# Patient Record
Sex: MALE | NOT HISPANIC OR LATINO
[De-identification: names, ages, dates, MRNs, and addresses within clinical notes are randomized per-mention and may not be internally consistent; named-entity substitution may affect disease eponyms.]

---

## 2018-08-28 ENCOUNTER — APPOINTMENT (OUTPATIENT)
Dept: ORTHOPEDIC SURGERY | Facility: CLINIC | Age: 58
End: 2018-08-28
Payer: MEDICAID

## 2018-08-28 VITALS
HEART RATE: 94 BPM | HEIGHT: 63 IN | DIASTOLIC BLOOD PRESSURE: 85 MMHG | SYSTOLIC BLOOD PRESSURE: 115 MMHG | BODY MASS INDEX: 19.49 KG/M2 | WEIGHT: 110 LBS

## 2018-08-28 DIAGNOSIS — Z82.61 FAMILY HISTORY OF ARTHRITIS: ICD-10-CM

## 2018-08-28 DIAGNOSIS — Z78.9 OTHER SPECIFIED HEALTH STATUS: ICD-10-CM

## 2018-08-28 DIAGNOSIS — Z80.1 FAMILY HISTORY OF MALIGNANT NEOPLASM OF TRACHEA, BRONCHUS AND LUNG: ICD-10-CM

## 2018-08-28 DIAGNOSIS — Z87.39 PERSONAL HISTORY OF OTHER DISEASES OF THE MUSCULOSKELETAL SYSTEM AND CONNECTIVE TISSUE: ICD-10-CM

## 2018-08-28 PROCEDURE — 20610 DRAIN/INJ JOINT/BURSA W/O US: CPT | Mod: 50

## 2018-08-28 PROCEDURE — 99203 OFFICE O/P NEW LOW 30 MIN: CPT | Mod: 25

## 2018-09-11 ENCOUNTER — OTHER (OUTPATIENT)
Age: 58
End: 2018-09-11

## 2018-09-18 ENCOUNTER — APPOINTMENT (OUTPATIENT)
Dept: ORTHOPEDIC SURGERY | Facility: CLINIC | Age: 58
End: 2018-09-18
Payer: MEDICAID

## 2018-09-18 VITALS
HEIGHT: 63 IN | BODY MASS INDEX: 19.49 KG/M2 | HEART RATE: 86 BPM | DIASTOLIC BLOOD PRESSURE: 86 MMHG | WEIGHT: 110 LBS | SYSTOLIC BLOOD PRESSURE: 126 MMHG

## 2018-09-18 PROCEDURE — 73502 X-RAY EXAM HIP UNI 2-3 VIEWS: CPT | Mod: LT

## 2018-09-18 PROCEDURE — 99214 OFFICE O/P EST MOD 30 MIN: CPT

## 2018-10-05 ENCOUNTER — APPOINTMENT (OUTPATIENT)
Dept: ORTHOPEDIC SURGERY | Facility: CLINIC | Age: 58
End: 2018-10-05
Payer: MEDICAID

## 2018-10-05 VITALS
HEART RATE: 75 BPM | BODY MASS INDEX: 19.49 KG/M2 | HEIGHT: 63 IN | WEIGHT: 110 LBS | DIASTOLIC BLOOD PRESSURE: 73 MMHG | SYSTOLIC BLOOD PRESSURE: 105 MMHG | TEMPERATURE: 97.9 F

## 2018-10-05 PROCEDURE — 72040 X-RAY EXAM NECK SPINE 2-3 VW: CPT

## 2018-10-05 PROCEDURE — 99215 OFFICE O/P EST HI 40 MIN: CPT

## 2018-12-04 ENCOUNTER — OUTPATIENT (OUTPATIENT)
Dept: OUTPATIENT SERVICES | Facility: HOSPITAL | Age: 58
LOS: 1 days | Discharge: HOME | End: 2018-12-04
Payer: MEDICAID

## 2018-12-04 PROCEDURE — 93660 TILT TABLE EVALUATION: CPT | Mod: 26

## 2018-12-11 DIAGNOSIS — R55 SYNCOPE AND COLLAPSE: ICD-10-CM

## 2019-06-04 ENCOUNTER — LABORATORY RESULT (OUTPATIENT)
Age: 59
End: 2019-06-04

## 2019-06-05 ENCOUNTER — MED ADMIN CHARGE (OUTPATIENT)
Age: 59
End: 2019-06-05

## 2019-06-05 ENCOUNTER — APPOINTMENT (OUTPATIENT)
Dept: INFECTIOUS DISEASE | Facility: CLINIC | Age: 59
End: 2019-06-05
Payer: MEDICAID

## 2019-06-05 ENCOUNTER — LABORATORY RESULT (OUTPATIENT)
Age: 59
End: 2019-06-05

## 2019-06-05 ENCOUNTER — OUTPATIENT (OUTPATIENT)
Dept: OUTPATIENT SERVICES | Facility: HOSPITAL | Age: 59
LOS: 1 days | End: 2019-06-05
Payer: MEDICAID

## 2019-06-05 VITALS
HEART RATE: 81 BPM | TEMPERATURE: 96.6 F | SYSTOLIC BLOOD PRESSURE: 103 MMHG | BODY MASS INDEX: 18.07 KG/M2 | DIASTOLIC BLOOD PRESSURE: 79 MMHG | OXYGEN SATURATION: 97 % | WEIGHT: 102 LBS

## 2019-06-05 DIAGNOSIS — Z00.00 ENCOUNTER FOR GENERAL ADULT MEDICAL EXAMINATION W/OUT ABNORMAL FINDINGS: ICD-10-CM

## 2019-06-05 DIAGNOSIS — Z23 ENCOUNTER FOR IMMUNIZATION: ICD-10-CM

## 2019-06-05 DIAGNOSIS — R05 COUGH: ICD-10-CM

## 2019-06-05 DIAGNOSIS — J44.9 CHRONIC OBSTRUCTIVE PULMONARY DISEASE, UNSPECIFIED: ICD-10-CM

## 2019-06-05 DIAGNOSIS — B97.89 OTHER VIRAL AGENTS AS THE CAUSE OF DISEASES CLASSIFIED ELSEWHERE: ICD-10-CM

## 2019-06-05 DIAGNOSIS — A69.20 LYME DISEASE, UNSPECIFIED: ICD-10-CM

## 2019-06-05 LAB
HCT VFR BLD CALC: 48.5 % — SIGNIFICANT CHANGE UP (ref 39–50)
HGB BLD-MCNC: 16.2 G/DL — SIGNIFICANT CHANGE UP (ref 13–17)
MCHC RBC-ENTMCNC: 33.4 GM/DL — SIGNIFICANT CHANGE UP (ref 32–36)
MCHC RBC-ENTMCNC: 33.4 PG — SIGNIFICANT CHANGE UP (ref 27–34)
MCV RBC AUTO: 100 FL — SIGNIFICANT CHANGE UP (ref 80–100)
PLATELET # BLD AUTO: 253 K/UL — SIGNIFICANT CHANGE UP (ref 150–400)
RBC # BLD: 4.85 M/UL — SIGNIFICANT CHANGE UP (ref 4.2–5.8)
RBC # FLD: 12.8 % — SIGNIFICANT CHANGE UP (ref 10.3–14.5)
WBC # BLD: 9.94 K/UL — SIGNIFICANT CHANGE UP (ref 3.8–10.5)
WBC # FLD AUTO: 9.94 K/UL — SIGNIFICANT CHANGE UP (ref 3.8–10.5)

## 2019-06-05 PROCEDURE — 87633 RESP VIRUS 12-25 TARGETS: CPT

## 2019-06-05 PROCEDURE — G0463: CPT | Mod: 25

## 2019-06-05 PROCEDURE — 36415 COLL VENOUS BLD VENIPUNCTURE: CPT

## 2019-06-05 PROCEDURE — 86618 LYME DISEASE ANTIBODY: CPT

## 2019-06-05 PROCEDURE — 86480 TB TEST CELL IMMUN MEASURE: CPT

## 2019-06-05 PROCEDURE — 82746 ASSAY OF FOLIC ACID SERUM: CPT

## 2019-06-05 PROCEDURE — 82607 VITAMIN B-12: CPT

## 2019-06-05 PROCEDURE — 87389 HIV-1 AG W/HIV-1&-2 AB AG IA: CPT

## 2019-06-05 PROCEDURE — 87040 BLOOD CULTURE FOR BACTERIA: CPT

## 2019-06-05 PROCEDURE — 90670 PCV13 VACCINE IM: CPT

## 2019-06-05 PROCEDURE — 85027 COMPLETE CBC AUTOMATED: CPT

## 2019-06-05 PROCEDURE — 87581 M.PNEUMON DNA AMP PROBE: CPT

## 2019-06-05 PROCEDURE — 87798 DETECT AGENT NOS DNA AMP: CPT

## 2019-06-05 PROCEDURE — 87486 CHLMYD PNEUM DNA AMP PROBE: CPT

## 2019-06-05 PROCEDURE — G0009: CPT

## 2019-06-05 PROCEDURE — 99204 OFFICE O/P NEW MOD 45 MIN: CPT

## 2019-06-06 LAB
B BURGDOR C6 AB SER-ACNC: NEGATIVE — SIGNIFICANT CHANGE UP
B BURGDOR IGG+IGM SER-ACNC: 0.02 INDEX — SIGNIFICANT CHANGE UP (ref 0.01–0.89)
FOLATE SERPL-MCNC: 11.9 NG/ML — SIGNIFICANT CHANGE UP
HIV 1+2 AB+HIV1 P24 AG SERPL QL IA: SIGNIFICANT CHANGE UP
RAPID RVP RESULT: SIGNIFICANT CHANGE UP
VIT B12 SERPL-MCNC: 592 PG/ML — SIGNIFICANT CHANGE UP (ref 232–1245)

## 2019-06-06 RX ORDER — DOXYCYCLINE HYCLATE 100 MG/1
100 CAPSULE ORAL
Qty: 28 | Refills: 0 | Status: DISCONTINUED | COMMUNITY
Start: 2019-06-05 | End: 2019-06-06

## 2019-06-06 NOTE — ASSESSMENT
[Treatment Adherence] : treatment adherence [Smoking Cessation] : smoking cessation [Rx Dose / Side Effects] : Rx dose/side effects [Drug Interactions / Side Effects] : drug interactions/side effects [FreeTextEntry1] : 59M with COPD, chronic pain, multiple ligament tears, current smoker here for positive lyme titers. \par \par The clinical manifestations of Lyme disease can generally be divided into three phases: early localized, early disseminated, and late disease. He does not have either of the signs, or symptoms of the first two. \par Lyme arthritis is characterized by intermittent or persistent arthritis in a few large joints, especially the knee which the patient does not have. In fact he reports pain more in his muscles then joints and denies swollen joints. \par There is a lot of controversy about post-lyme disease syndrome aka chronic lyme disease. \par \par IDSA has proposed a definition of post-Lyme disease syndrome. Criteria for this syndrome include a prior history of Lyme disease treated with an accepted regimen and resolution or stabilization of the objective manifestations of Lyme disease. In addition, the onset of subjective symptoms (eg, fatigue, widespread musculoskeletal pain, complaints of cognitive difficulties) must have occurred within six months of the diagnosis of Lyme disease and persist (continuously or relapsing) for at least six months after completion of antimicrobial therapy. \par \par Mr. Garcia denies being diagnosed and treated for lyme. Since the patient has not been treated and has a positive IgM, would favor a short course of doxycycline. Additionally, he has lost weight, is cachectic, and has COPD and still smokes. Malignancy workup should be done. \par He also has leukocytosis. HIV, STD testing, blood cultures may help distinguish a diagnosis. \par \par Recommendations: \par #Joint Pains/Lyme \par -will treate with Doxy 100mg BID x14 days \par -will send Lyme C6 and Lyme PCR \par -will send Tick Borne panel to ensure no co-infection \par -ESR/CRP/B12/Folate/TSH\par \par #Leukocytosis\par -Send HIV, syphilis, Hep C, QuantiFeron \par -2 sets of blood cultures \par \par #COPD\par -PCP or Pulmonologist should refer for CT chest for screening for Lung Ca\par -Smoking Cessation counselling done in office \par  [Treatment Education] : treatment education [Anticipatory Guidance] : anticipatory guidance
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
## 2019-06-06 NOTE — END OF VISIT
[] : Fellow [FreeTextEntry3] : Patient seen and examined with Dr. butterfield\par I agree with his history exam and plans as noted above.  HAs a + IgM for Lyme disease but nospecific reported exposure history  Will treat with a short course of doxycycoine. His chronic symptom complaints would not be related to Lyme disease based on serology studies and are most likely all tied to chronic smoking history and known COPD. He reports recent chest CT scan that was negative for a malignancy\par Will r/o other infecitious causes of his fatigue SBE etc.

## 2019-06-06 NOTE — PHYSICAL EXAM
[General Appearance - Alert] : alert [Sclera] : the sclera and conjunctiva were normal [Extraocular Movements] : extraocular movements were intact [Neck Appearance] : the appearance of the neck was normal [Neck Cervical Mass (___cm)] : no neck mass was observed [Jugular Venous Distention Increased] : there was no jugular-venous distention [Thyroid Diffuse Enlargement] : the thyroid was not enlarged [Auscultation Breath Sounds / Voice Sounds] : lungs were clear to auscultation bilaterally [Heart Sounds] : normal S1 and S2 [Murmurs] : no murmurs [Bowel Sounds] : normal bowel sounds [Abdomen Soft] : soft [Abdomen Tenderness] : non-tender [Abdomen Mass (___ Cm)] : no abdominal mass palpated [No Palpable Adenopathy] : no palpable adenopathy [Costovertebral Angle Tenderness] : no CVA tenderness [] : no rash [Skin Color & Pigmentation] : normal skin color and pigmentation [Oriented To Time, Place, And Person] : oriented to person, place, and time [FreeTextEntry1] : + pink puffer, decreased breath sounds b/l

## 2019-06-06 NOTE — REVIEW OF SYSTEMS
[Difficulty Sleeping] : difficulty sleeping [Body Aches] : body aches [Feeling Tired] : feeling tired [Normal Appetite] : normal appetite [Wheezing] : wheezing [Cough] : cough [SOB on Exertion] : shortness of breath during exertion [Sputum] : coughing up ~M sputum [Joint Stiffness] : joint stiffness [Joint Pain] : joint pain [Limb Pain] : limb pain [Negative] : Neurological [Fever] : no fever [Chills] : no chills [Abdominal Pain] : no abdominal pain [Vomiting] : no vomiting

## 2019-06-11 LAB
CULTURE RESULTS: SIGNIFICANT CHANGE UP
CULTURE RESULTS: SIGNIFICANT CHANGE UP
SPECIMEN SOURCE: SIGNIFICANT CHANGE UP
SPECIMEN SOURCE: SIGNIFICANT CHANGE UP

## 2019-06-17 ENCOUNTER — LABORATORY RESULT (OUTPATIENT)
Age: 59
End: 2019-06-17

## 2019-06-17 ENCOUNTER — APPOINTMENT (OUTPATIENT)
Dept: INFECTIOUS DISEASE | Facility: CLINIC | Age: 59
End: 2019-06-17

## 2019-06-17 ENCOUNTER — OUTPATIENT (OUTPATIENT)
Dept: OUTPATIENT SERVICES | Facility: HOSPITAL | Age: 59
LOS: 1 days | End: 2019-06-17
Payer: MEDICAID

## 2019-06-17 DIAGNOSIS — B97.89 OTHER VIRAL AGENTS AS THE CAUSE OF DISEASES CLASSIFIED ELSEWHERE: ICD-10-CM

## 2019-06-17 LAB
24R-OH-CALCIDIOL SERPL-MCNC: 55.6 NG/ML — SIGNIFICANT CHANGE UP (ref 30–80)
ALBUMIN SERPL ELPH-MCNC: 4.7 G/DL — SIGNIFICANT CHANGE UP (ref 3.3–5)
ALP SERPL-CCNC: 56 U/L — SIGNIFICANT CHANGE UP (ref 40–120)
ALT FLD-CCNC: 12 U/L — SIGNIFICANT CHANGE UP (ref 10–45)
ANION GAP SERPL CALC-SCNC: 12 MMOL/L — SIGNIFICANT CHANGE UP (ref 5–17)
AST SERPL-CCNC: 22 U/L — SIGNIFICANT CHANGE UP (ref 10–40)
BILIRUB SERPL-MCNC: 0.4 MG/DL — SIGNIFICANT CHANGE UP (ref 0.2–1.2)
BUN SERPL-MCNC: 13 MG/DL — SIGNIFICANT CHANGE UP (ref 7–23)
CALCIUM SERPL-MCNC: 9.9 MG/DL — SIGNIFICANT CHANGE UP (ref 8.4–10.5)
CHLORIDE SERPL-SCNC: 103 MMOL/L — SIGNIFICANT CHANGE UP (ref 96–108)
CO2 SERPL-SCNC: 26 MMOL/L — SIGNIFICANT CHANGE UP (ref 22–31)
CREAT SERPL-MCNC: 1.01 MG/DL — SIGNIFICANT CHANGE UP (ref 0.5–1.3)
CRP SERPL-MCNC: 0.17 MG/DL — SIGNIFICANT CHANGE UP (ref 0–0.4)
ERYTHROCYTE [SEDIMENTATION RATE] IN BLOOD: 5 MM/HR — SIGNIFICANT CHANGE UP (ref 0–20)
GLUCOSE SERPL-MCNC: 94 MG/DL — SIGNIFICANT CHANGE UP (ref 70–99)
HCV AB S/CO SERPL IA: 0.09 S/CO — SIGNIFICANT CHANGE UP (ref 0–0.99)
HCV AB SERPL-IMP: SIGNIFICANT CHANGE UP
POTASSIUM SERPL-MCNC: 4.4 MMOL/L — SIGNIFICANT CHANGE UP (ref 3.5–5.3)
POTASSIUM SERPL-SCNC: 4.4 MMOL/L — SIGNIFICANT CHANGE UP (ref 3.5–5.3)
PROT SERPL-MCNC: 6.6 G/DL — SIGNIFICANT CHANGE UP (ref 6–8.3)
SODIUM SERPL-SCNC: 140 MMOL/L — SIGNIFICANT CHANGE UP (ref 135–145)
T PALLIDUM AB TITR SER: NEGATIVE — SIGNIFICANT CHANGE UP
T4 FREE+ TSH PNL SERPL: 1.46 UIU/ML — SIGNIFICANT CHANGE UP (ref 0.27–4.2)

## 2019-06-17 PROCEDURE — 86753 PROTOZOA ANTIBODY NOS: CPT

## 2019-06-17 PROCEDURE — 86780 TREPONEMA PALLIDUM: CPT

## 2019-06-17 PROCEDURE — 86140 C-REACTIVE PROTEIN: CPT

## 2019-06-17 PROCEDURE — 84443 ASSAY THYROID STIM HORMONE: CPT

## 2019-06-17 PROCEDURE — 85652 RBC SED RATE AUTOMATED: CPT

## 2019-06-17 PROCEDURE — 87476 LYME DIS DNA AMP PROBE: CPT

## 2019-06-17 PROCEDURE — 87522 HEPATITIS C REVRS TRNSCRPJ: CPT

## 2019-06-17 PROCEDURE — 86666 EHRLICHIA ANTIBODY: CPT

## 2019-06-17 PROCEDURE — 86803 HEPATITIS C AB TEST: CPT

## 2019-06-17 PROCEDURE — 80053 COMPREHEN METABOLIC PANEL: CPT

## 2019-06-17 PROCEDURE — 86618 LYME DISEASE ANTIBODY: CPT

## 2019-06-17 PROCEDURE — 82306 VITAMIN D 25 HYDROXY: CPT

## 2019-06-19 LAB
A PHAGOCYTOPH IGG TITR SER IF: SIGNIFICANT CHANGE UP TITER
B BURGDOR AB SER QL IA: NEGATIVE — SIGNIFICANT CHANGE UP
B MICROTI IGG TITR SER: SIGNIFICANT CHANGE UP TITER
E CHAFFEENSIS IGG TITR SER IF: SIGNIFICANT CHANGE UP TITER
HCV RNA SERPL NAA DL=5-ACNC: SIGNIFICANT CHANGE UP IU/ML
HCV RNA SPEC NAA+PROBE-LOG IU: SIGNIFICANT CHANGE UP LOGIU/ML

## 2019-06-21 LAB — B BURGDOR DNA SPEC QL NAA+PROBE: NEGATIVE — SIGNIFICANT CHANGE UP

## 2019-07-10 ENCOUNTER — APPOINTMENT (OUTPATIENT)
Dept: INFECTIOUS DISEASE | Facility: CLINIC | Age: 59
End: 2019-07-10
Payer: MEDICAID

## 2019-07-10 ENCOUNTER — OUTPATIENT (OUTPATIENT)
Dept: OUTPATIENT SERVICES | Facility: HOSPITAL | Age: 59
LOS: 1 days | End: 2019-07-10
Payer: MEDICAID

## 2019-07-10 VITALS
HEART RATE: 78 BPM | SYSTOLIC BLOOD PRESSURE: 94 MMHG | BODY MASS INDEX: 18.43 KG/M2 | OXYGEN SATURATION: 97 % | TEMPERATURE: 97 F | HEIGHT: 63 IN | WEIGHT: 104 LBS | DIASTOLIC BLOOD PRESSURE: 69 MMHG

## 2019-07-10 DIAGNOSIS — B97.89 OTHER VIRAL AGENTS AS THE CAUSE OF DISEASES CLASSIFIED ELSEWHERE: ICD-10-CM

## 2019-07-10 PROCEDURE — G0463: CPT

## 2019-07-10 PROCEDURE — 99214 OFFICE O/P EST MOD 30 MIN: CPT

## 2019-07-10 RX ORDER — NAPROXEN 500 MG/1
500 TABLET ORAL
Qty: 30 | Refills: 0 | Status: ACTIVE | COMMUNITY
Start: 2019-07-10 | End: 1900-01-01

## 2019-07-10 NOTE — REVIEW OF SYSTEMS
[Difficulty Sleeping] : difficulty sleeping [Body Aches] : body aches [Feeling Tired] : feeling tired [Joint Pain] : joint pain [Shortness Of Breath] : shortness of breath [Limb Pain] : limb pain [Dizziness] : dizziness [Negative] : Psychiatric

## 2019-07-11 NOTE — END OF VISIT
[FreeTextEntry3] : Patient seen and examined with Dr Ferguson\par I agree with his interval history exam and plans as above\par Discussed at length with patient that no  further abx treatment is needed. No evidence of active tick born illness Most of his complainats can be tied to his ongoing cigarette use and offered smoking cessation which he declined

## 2019-07-11 NOTE — PHYSICAL EXAM
[General Appearance - Alert] : alert [Extraocular Movements] : extraocular movements were intact [Sclera] : the sclera and conjunctiva were normal [Neck Appearance] : the appearance of the neck was normal [Neck Cervical Mass (___cm)] : no neck mass was observed [Jugular Venous Distention Increased] : there was no jugular-venous distention [Thyroid Diffuse Enlargement] : the thyroid was not enlarged [Auscultation Breath Sounds / Voice Sounds] : lungs were clear to auscultation bilaterally [Heart Sounds] : normal S1 and S2 [Murmurs] : no murmurs [Abdomen Soft] : soft [Bowel Sounds] : normal bowel sounds [Abdomen Tenderness] : non-tender [Abdomen Mass (___ Cm)] : no abdominal mass palpated [Costovertebral Angle Tenderness] : no CVA tenderness [No Palpable Adenopathy] : no palpable adenopathy [Skin Color & Pigmentation] : normal skin color and pigmentation [] : no rash [Oriented To Time, Place, And Person] : oriented to person, place, and time [FreeTextEntry1] : + pink puffer, decreased breath sounds b/l

## 2019-07-11 NOTE — ASSESSMENT
[FreeTextEntry1] : 59M with COPD, chronic pain, multiple ligament tears, current smoker here for positive lyme titers. \par \par The clinical manifestations of Lyme disease can generally be divided into three phases: early localized, early disseminated, and late disease. He does not have either of the signs, or symptoms of the first two. \par Lyme arthritis is characterized by intermittent or persistent arthritis in a few large joints, especially the knee which the patient does not have. In fact he reports pain more in his muscles then joints and denies swollen joints. \par There is a lot of controversy about post-lyme disease syndrome aka chronic lyme disease. \par \par IDSA has proposed a definition of post-Lyme disease syndrome. Criteria for this syndrome include a prior history of Lyme disease treated with an accepted regimen and resolution or stabilization of the objective manifestations of Lyme disease. In addition, the onset of subjective symptoms (eg, fatigue, widespread musculoskeletal pain, complaints of cognitive difficulties) must have occurred within six months of the diagnosis of Lyme disease and persist (continuously or relapsing) for at least six months after completion of antimicrobial therapy. \par \par Mr. Garcia denies being diagnosed and treated for lyme. Since the patient had not been treated and had a positive IgM, we treated with 14 days of doxycycline. \par Additionally, he has lost weight, is cachectic, and has COPD and still smokes. \par Malignancy workup should be done. \par The workup for other infectious causes were negative. \par \par Recommendations: \par \par #Joint Pains/Lyme \par -s/p treatment with Doxy 100mg BID x14 days \par - Lyme C6 and Lyme PCR both negative\par -Tick Borne panel negative \par -ESR/CRP/B12/Folate/TSH all normal \par \par #Headaches/Neuropathy\par -He should be closely followed by a Neurologist that he trusts \par -B12 and folate are normal \par -smoking probably exacerbating the problem \par -will obtain CT head without contrast to rule out bleeds, hydrocephalus and large masses \par -Neurology referral given \par \par #Chronic Pain Syndrome\par -unclear etiology of patients pain\par -will refer to PM&R \par -Naproxen PRN for pain \par -Ortho follow up \par -If patient wants, we can refer him to pain management \par \par #Leukocytosis\par -HIV, syphilis, Hep C, QuantiFeron negative \par -2 sets of blood cultures negative\par -Leukocytosis resolved \par \par #COPD\par -PCP or Pulmonologist should refer for CT chest for screening for Lung Ca\par -Pulmonologist has patient on Prednisone 5 mg daily \par -Smoking Cessation counselling done in office again and explained that many of his symptoms may be attributed to his long term smoking and he should quit. He said he is motivated but that smoking is like 2nd nature at this point in his life.  [Treatment Education] : treatment education [Treatment Adherence] : treatment adherence [Rx Dose / Side Effects] : Rx dose/side effects [Risk Reduction] : risk reduction [Nutritional / Food Issues] : nutritional/food issues [Universal Precautions] : universal precautions [Smoking Cessation] : smoking cessation [Medical Care Issues] : medical care issues
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
## 2019-07-15 DIAGNOSIS — G43.909 MIGRAINE, UNSPECIFIED, NOT INTRACTABLE, WITHOUT STATUS MIGRAINOSUS: ICD-10-CM

## 2019-07-15 DIAGNOSIS — G89.4 CHRONIC PAIN SYNDROME: ICD-10-CM

## 2019-07-22 ENCOUNTER — OUTPATIENT (OUTPATIENT)
Dept: OUTPATIENT SERVICES | Facility: HOSPITAL | Age: 59
LOS: 1 days | End: 2019-07-22
Payer: MEDICAID

## 2019-07-22 ENCOUNTER — APPOINTMENT (OUTPATIENT)
Dept: CT IMAGING | Facility: CLINIC | Age: 59
End: 2019-07-22
Payer: MEDICAID

## 2019-07-22 DIAGNOSIS — Z00.8 ENCOUNTER FOR OTHER GENERAL EXAMINATION: ICD-10-CM

## 2019-07-22 PROCEDURE — 70450 CT HEAD/BRAIN W/O DYE: CPT

## 2019-07-22 PROCEDURE — 70450 CT HEAD/BRAIN W/O DYE: CPT | Mod: 26

## 2019-11-13 ENCOUNTER — MED ADMIN CHARGE (OUTPATIENT)
Age: 59
End: 2019-11-13

## 2019-11-13 ENCOUNTER — OUTPATIENT (OUTPATIENT)
Dept: OUTPATIENT SERVICES | Facility: HOSPITAL | Age: 59
LOS: 1 days | End: 2019-11-13
Payer: MEDICAID

## 2019-11-13 ENCOUNTER — APPOINTMENT (OUTPATIENT)
Dept: INFECTIOUS DISEASE | Facility: CLINIC | Age: 59
End: 2019-11-13
Payer: MEDICAID

## 2019-11-13 VITALS
HEIGHT: 63 IN | DIASTOLIC BLOOD PRESSURE: 73 MMHG | HEART RATE: 82 BPM | BODY MASS INDEX: 17.36 KG/M2 | SYSTOLIC BLOOD PRESSURE: 103 MMHG | WEIGHT: 98 LBS | OXYGEN SATURATION: 97 %

## 2019-11-13 DIAGNOSIS — B97.89 OTHER VIRAL AGENTS AS THE CAUSE OF DISEASES CLASSIFIED ELSEWHERE: ICD-10-CM

## 2019-11-13 PROCEDURE — 90732 PPSV23 VACC 2 YRS+ SUBQ/IM: CPT

## 2019-11-13 PROCEDURE — G0463: CPT | Mod: 25

## 2019-11-13 PROCEDURE — 99214 OFFICE O/P EST MOD 30 MIN: CPT

## 2019-11-13 PROCEDURE — G0009: CPT

## 2019-11-13 NOTE — PHYSICAL EXAM
[General Appearance - Alert] : alert [Extraocular Movements] : extraocular movements were intact [Sclera] : the sclera and conjunctiva were normal [Neck Appearance] : the appearance of the neck was normal [Neck Cervical Mass (___cm)] : no neck mass was observed [Jugular Venous Distention Increased] : there was no jugular-venous distention [Thyroid Diffuse Enlargement] : the thyroid was not enlarged [Auscultation Breath Sounds / Voice Sounds] : lungs were clear to auscultation bilaterally [Heart Sounds] : normal S1 and S2 [Murmurs] : no murmurs [Bowel Sounds] : normal bowel sounds [Abdomen Soft] : soft [Abdomen Tenderness] : non-tender [Abdomen Mass (___ Cm)] : no abdominal mass palpated [Costovertebral Angle Tenderness] : no CVA tenderness [Skin Color & Pigmentation] : normal skin color and pigmentation [] : no rash [No Palpable Adenopathy] : no palpable adenopathy [Oriented To Time, Place, And Person] : oriented to person, place, and time [FreeTextEntry1] : + pink puffer, decreased breath sounds b/l
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
## 2019-11-13 NOTE — END OF VISIT
[] : Fellow [FreeTextEntry3] : Patient seen and examined with Dr. Ferguson\par I agree with his interval history exam and plans asnoted above\par \par Patient does not have acute nor chronic Lyme disease. He is a heavy smoker and needs to follow up with pulomonary. I think he needs a chest CT scan to r/o underlying malignancy  and patient is aware. His insurance is requiring a CXR prior to CT scan which we will order today. He is seeing Pulmonary this week and will ask for a chest CT scan at his appointment. Pulmonologist is welcome to contact me for further  discussion at [phone]

## 2019-11-13 NOTE — ASSESSMENT
[FreeTextEntry1] : 59M with COPD, chronic pain, multiple ligament tears, current smoker here for positive lyme titers and pain. \par \par The clinical manifestations of Lyme disease can generally be divided into three phases: early localized, early disseminated, and late disease. He does not have either of the signs, or symptoms of the first two. \par Lyme arthritis is characterized by intermittent or persistent arthritis in a few large joints, especially the knee which the patient does not have. In fact he reports pain more in his muscles then joints and denies swollen joints. \par There is a lot of controversy about post-lyme disease syndrome aka chronic lyme disease. \par \par IDSA has proposed a definition of post-Lyme disease syndrome. Criteria for this syndrome include a prior history of Lyme disease treated with an accepted regimen and resolution or stabilization of the objective manifestations of Lyme disease. In addition, the onset of subjective symptoms (eg, fatigue, widespread musculoskeletal pain, complaints of cognitive difficulties) must have occurred within six months of the diagnosis of Lyme disease and persist (continuously or relapsing) for at least six months after completion of antimicrobial therapy. \par \par Mr. Garcia was given a 14 day course of doxy without any resolution of symptoms. Additionally, he has lost weight, is cachectic, and has COPD and still smokes. Malignancy workup should be done. \par We have thus ruled out vitamin B12, folate and vit D deficiency, HIV, hep C, lyme, tick born illnesses.  \par \par \par Recommendations: \par #Joint Pains/Lyme \par -s/p treatment with 14days of doxy\par - Lyme C6 negative and Lyme PCR negative\par -Tick borne panel negative\par -ESR/CRP/B12/Folate/TSH all normal\par \par #COPD\par -Given patient has chronic cough and ROBIN, weight loss will highly recommend CT chest without contrast \par -Will get CXR initially then likely CT chest\par -Smoking Cessation counselling done in office \par -last visit dany Scott and today will give Pneumovax

## 2019-11-13 NOTE — REVIEW OF SYSTEMS
[Body Aches] : body aches [Feeling Sick] : feeling sick [Feeling Tired] : feeling tired [Hoarseness] : hoarseness [Shortness Of Breath] : shortness of breath [Cough] : cough [SOB on Exertion] : shortness of breath during exertion [Joint Stiffness] : joint stiffness [Joint Pain] : joint pain [Limb Pain] : limb pain [Dizziness] : dizziness [Depression] : depression [Negative] : Integumentary [Fever] : no fever [Chills] : no chills [Sore Throat] : no sore throat [Confused] : no confusion [Convulsions] : no convulsions [Suicidal] : not suicidal

## 2019-11-21 DIAGNOSIS — S76.312A STRAIN OF MUSCLE, FASCIA AND TENDON OF THE POSTERIOR MUSCLE GROUP AT THIGH LEVEL, LEFT THIGH, INITIAL ENCOUNTER: ICD-10-CM

## 2019-11-21 DIAGNOSIS — M75.112 INCOMPLETE ROTATOR CUFF TEAR OR RUPTURE OF LEFT SHOULDER, NOT SPECIFIED AS TRAUMATIC: ICD-10-CM

## 2019-11-21 DIAGNOSIS — G89.4 CHRONIC PAIN SYNDROME: ICD-10-CM

## 2019-11-21 DIAGNOSIS — J47.9 BRONCHIECTASIS, UNCOMPLICATED: ICD-10-CM

## 2019-11-21 DIAGNOSIS — Z23 ENCOUNTER FOR IMMUNIZATION: ICD-10-CM

## 2019-12-16 ENCOUNTER — APPOINTMENT (OUTPATIENT)
Dept: RADIOLOGY | Facility: CLINIC | Age: 59
End: 2019-12-16
Payer: MEDICAID

## 2019-12-16 ENCOUNTER — OUTPATIENT (OUTPATIENT)
Dept: OUTPATIENT SERVICES | Facility: HOSPITAL | Age: 59
LOS: 1 days | End: 2019-12-16
Payer: MEDICAID

## 2019-12-16 DIAGNOSIS — Z00.8 ENCOUNTER FOR OTHER GENERAL EXAMINATION: ICD-10-CM

## 2019-12-16 PROCEDURE — 71046 X-RAY EXAM CHEST 2 VIEWS: CPT

## 2019-12-16 PROCEDURE — 71046 X-RAY EXAM CHEST 2 VIEWS: CPT | Mod: 26

## 2020-02-12 ENCOUNTER — LABORATORY RESULT (OUTPATIENT)
Age: 60
End: 2020-02-12

## 2020-02-12 ENCOUNTER — OUTPATIENT (OUTPATIENT)
Dept: OUTPATIENT SERVICES | Facility: HOSPITAL | Age: 60
LOS: 1 days | End: 2020-02-12
Payer: MEDICAID

## 2020-02-12 ENCOUNTER — APPOINTMENT (OUTPATIENT)
Dept: INFECTIOUS DISEASE | Facility: CLINIC | Age: 60
End: 2020-02-12
Payer: MEDICAID

## 2020-02-12 VITALS
SYSTOLIC BLOOD PRESSURE: 96 MMHG | OXYGEN SATURATION: 96 % | DIASTOLIC BLOOD PRESSURE: 69 MMHG | RESPIRATION RATE: 16 BRPM | HEART RATE: 74 BPM | HEIGHT: 63 IN | TEMPERATURE: 97 F

## 2020-02-12 DIAGNOSIS — B97.89 OTHER VIRAL AGENTS AS THE CAUSE OF DISEASES CLASSIFIED ELSEWHERE: ICD-10-CM

## 2020-02-12 PROCEDURE — 87581 M.PNEUMON DNA AMP PROBE: CPT

## 2020-02-12 PROCEDURE — 87633 RESP VIRUS 12-25 TARGETS: CPT

## 2020-02-12 PROCEDURE — 87486 CHLMYD PNEUM DNA AMP PROBE: CPT

## 2020-02-12 PROCEDURE — G0463: CPT

## 2020-02-12 PROCEDURE — 87798 DETECT AGENT NOS DNA AMP: CPT

## 2020-02-12 PROCEDURE — 99214 OFFICE O/P EST MOD 30 MIN: CPT

## 2020-02-12 NOTE — PHYSICAL EXAM
[General Appearance - Alert] : alert [Sclera] : the sclera and conjunctiva were normal [Extraocular Movements] : extraocular movements were intact [Neck Appearance] : the appearance of the neck was normal [Neck Cervical Mass (___cm)] : no neck mass was observed [Jugular Venous Distention Increased] : there was no jugular-venous distention [Thyroid Diffuse Enlargement] : the thyroid was not enlarged [Auscultation Breath Sounds / Voice Sounds] : lungs were clear to auscultation bilaterally [Heart Sounds] : normal S1 and S2 [Murmurs] : no murmurs [Bowel Sounds] : normal bowel sounds [Abdomen Soft] : soft [Abdomen Tenderness] : non-tender [Abdomen Mass (___ Cm)] : no abdominal mass palpated [Costovertebral Angle Tenderness] : no CVA tenderness [No Palpable Adenopathy] : no palpable adenopathy [Skin Color & Pigmentation] : normal skin color and pigmentation [] : no rash [No Focal Deficits] : no focal deficits [Oriented To Time, Place, And Person] : oriented to person, place, and time [FreeTextEntry1] : teeth are absent, lower gums appears to have had surgery

## 2020-02-12 NOTE — REVIEW OF SYSTEMS
[Body Aches] : body aches [Difficulty Sleeping] : difficulty sleeping [Feeling Tired] : feeling tired [Shortness Of Breath] : shortness of breath [Wheezing] : wheezing [Cough] : cough [SOB on Exertion] : shortness of breath during exertion [Joint Pain] : joint pain [Negative] : Heme/Lymph [Confused] : no confusion [Fever] : no fever [Convulsions] : no convulsions [de-identified] : +migraines
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
## 2020-02-12 NOTE — ASSESSMENT
[FreeTextEntry1] : 59M with COPD, chronic pain, multiple ligament tears, current smoker here for positive lyme titers and pain. \par We have addressed his Lyme concern (see previous ID notes) and no longer think he has lyme or needs further antibiotic therapy. There is concern given his severe emphysema that there could be underlying malignancy or worsening COPD. Additionally he has an acute event today and will give nebulizer therapy. He is saturating 96% on room air. \par \par Pt has lost weight, is cachectic, and has COPD and still smokes. Malignancy workup should be done. Which he reports that his PMD has started as well. CT chest done at [facility] 12/16/19 report is being sent to our clinic. In the mean time will refer patient to [facility] pulmonologist in [place] as his former pulm  is dropping his insurance. \par He has been ruled out for issues pertaining to vitamin B12, folate and vit D deficiency, HIV, hep C, lyme, tick born illnesses. \par \par Recommendations: \par #Joint Pains/Lyme \par -s/p treatment with 14 days of doxy\par - Lyme C6 negative and Lyme PCR negative\par -Tick borne panel negative\par -ESR/CRP/B12/Folate/TSH all normal\par \par #COPD/Cough\par -Given patient has chronic cough and ROBIN, weight loss concerned of primary lung pathology \par -CT chest without contrast done 12/16/19- awaiting results \par -repeat CXR today given worsening cough \par -RVP done in office \par -Smoking Cessation counselling done in office \par -Prevnar 6/5/19\par -Pneumovax 11/13/19\par -Pulmonary consult for new pulmonologist \par \par #Migraines\par CT head negative (report to be scanned)\par referral given for new neurologist but he hasn’t seen one yet \par advised to make an appointment

## 2020-02-12 NOTE — END OF VISIT
[FreeTextEntry3] : Patient seen and examined with Dr. Ferguson\par I agree with his interval history exam and plans as noted above\par Patient continues to smoke cigarettes RTC today complaining of cough and wheezing diffusely on exam. no fevers or chills  will check rVP, needs albuterol nebulizer treatment\par Will need CXR needs to sing release so we can obtain CT scan results\par Requesting a referral for a new pulmonary physician through [facility] also needs a PCP\par \par  [] : Fellow

## 2020-02-13 DIAGNOSIS — R05 COUGH: ICD-10-CM

## 2020-02-13 DIAGNOSIS — J44.9 CHRONIC OBSTRUCTIVE PULMONARY DISEASE, UNSPECIFIED: ICD-10-CM

## 2020-02-13 LAB — RAPID RVP RESULT: SIGNIFICANT CHANGE UP

## 2020-03-25 ENCOUNTER — APPOINTMENT (OUTPATIENT)
Dept: INFECTIOUS DISEASE | Facility: CLINIC | Age: 60
End: 2020-03-25
Payer: MEDICAID

## 2020-03-25 ENCOUNTER — OUTPATIENT (OUTPATIENT)
Dept: OUTPATIENT SERVICES | Facility: HOSPITAL | Age: 60
LOS: 1 days | End: 2020-03-25
Payer: MEDICAID

## 2020-03-25 DIAGNOSIS — J43.9 EMPHYSEMA, UNSPECIFIED: ICD-10-CM

## 2020-03-25 DIAGNOSIS — G89.29 OTHER CHRONIC PAIN: ICD-10-CM

## 2020-03-25 DIAGNOSIS — B97.89 OTHER VIRAL AGENTS AS THE CAUSE OF DISEASES CLASSIFIED ELSEWHERE: ICD-10-CM

## 2020-03-25 DIAGNOSIS — G43.709 CHRONIC MIGRAINE WITHOUT AURA, NOT INTRACTABLE, WITHOUT STATUS MIGRAINOSUS: ICD-10-CM

## 2020-03-25 PROCEDURE — ZZZZZ: CPT

## 2020-03-25 PROCEDURE — G0463: CPT

## 2020-04-23 ENCOUNTER — APPOINTMENT (OUTPATIENT)
Dept: PHYSICAL MEDICINE AND REHAB | Facility: CLINIC | Age: 60
End: 2020-04-23
Payer: MEDICAID

## 2020-04-23 PROCEDURE — 99203 OFFICE O/P NEW LOW 30 MIN: CPT

## 2020-04-23 RX ORDER — DOXYCYCLINE 100 MG/1
100 TABLET, FILM COATED ORAL TWICE DAILY
Qty: 28 | Refills: 0 | Status: DISCONTINUED | COMMUNITY
Start: 2019-06-06 | End: 2020-04-23

## 2020-04-23 NOTE — DATA REVIEWED
[MRI] : MRI [FreeTextEntry1] : MRI R Shoulder (April 2019): SS tendinosis w/ low-grade tearing; LHBT tendinosis; tear of inferior glenoid labrum w/ associated small paralabral cyst.\par \par MRI L Shoulder (April 2019): SS tendinosis w/ mild fraying; subscapularis tendinosis; RC tendinopathy somewhat progressed since Jan 2018; DJD GH & AC joints; LHBT tendinosis and low-grade tearing; small non-displaced superior labral tear (new since Jan 2018).\par \par MRI C-spine (Oct. 2018): multilevel DDD w/ moderate narrowing spinal canal at C5-6 and mild narrowing at C6-7; varying degrees of NF stenosis, worse at C5-6.

## 2020-04-23 NOTE — ASSESSMENT
[FreeTextEntry1] : 60 y.o. M w/ h/o COPD, Graves disease and chronic Lyme disease/syndrome presents w/ multiple, chronic MSK pain complaints.  Cervical DDD/spondylosis and b/l RC tendinopathy will be addressed first in P.T. followed by his LE MSK complaints.  I cautioned patient regarding chronic use of PO NSAIDs 2' GI/cardiac/renal toxicities.  No indication for CSI given chronicity of patient's condition.  RTC 6 weeks for further clinical f/u.

## 2020-04-23 NOTE — HISTORY OF PRESENT ILLNESS
[FreeTextEntry1] : 60 y.o. M w/ h/o COPD, Grave's disease and Lyme disease presents to office w/ c/o neck pain, b/l shoulder pain and b/l hip pain (L > R).  Pt. endorses h/o left shoulder RCT repair (2008-9) w/ good relief of sxs for 2 years.  No surgery on right shoulder.  Pt. states that he has a 'tear" in his left hip or glut muscle.  Pt. denies significant LBP.  He reports intermittent numbness down left leg w/ periods of prolonged sitting, as well as N/T in hands and feet.  No h/o DM.  \par \par Pt. had multiple imaging studies over the years.  No recent courses of P.T.  Pt. is maintained on prednisone 5-10 mg po qd + naproxen for pain.  No narcotic analgesics.  Pt. had trigger point injections many years ago.

## 2020-04-23 NOTE — PHYSICAL EXAM
[FreeTextEntry1] : NAD\par A&Ox3\par Non-obese\par C-spine ROM: 2 FBs chin-to-sternum; 10' extension; 40' LR to either side\par Webb's: neg\par Lhermitte's: neg\par Spurling's: deferred\par DTR's: depressed biceps b/l; 2+ triceps and BR reflexes\par MMT: poor effort\par Sensation: SILT\par Palpation: B/L sup trap and cervical paraspinal muscle TTP\par ROM Shoulders: 120' ABD b/l before pain\par Neer's: + b/l\par Hawkin's: deferred\par Drop Arm: neg\par Scarf: deferred\par RC MMT: 4/5 pain-related weakness SS; 5-/5 B/L IS\par Palpation: TTP B/L distal SS tendon insertion sites\par

## 2020-05-18 ENCOUNTER — APPOINTMENT (OUTPATIENT)
Dept: INTERNAL MEDICINE | Facility: CLINIC | Age: 60
End: 2020-05-18
Payer: MEDICAID

## 2020-05-18 VITALS
HEIGHT: 63 IN | SYSTOLIC BLOOD PRESSURE: 100 MMHG | HEART RATE: 98 BPM | WEIGHT: 100 LBS | DIASTOLIC BLOOD PRESSURE: 78 MMHG | RESPIRATION RATE: 18 BRPM | TEMPERATURE: 97.6 F | OXYGEN SATURATION: 96 % | BODY MASS INDEX: 17.72 KG/M2

## 2020-05-18 PROCEDURE — 99406 BEHAV CHNG SMOKING 3-10 MIN: CPT

## 2020-05-18 PROCEDURE — 99204 OFFICE O/P NEW MOD 45 MIN: CPT

## 2020-05-19 NOTE — HISTORY OF PRESENT ILLNESS
[TextBox_4] : PCP is Dr Cherelle Eddy.\par \par This the first pulmonary appointment here for this 71-year-old male with a history including chronic pain, PTSD, hypothyroidism, Graves' disease, osteoarthritis, allergic rhinitis, migraine HA, and COPD.  He currently is being maintained on Stiolto Respimat 2 inhalations/day.  It was prescribed by his previous pulmonary doctor Dr. Montilla.  He was tried on Spiriva and other inhalers in the past and tells that stiolto was the only inhaler that was of of help to him.  Most recently he was receiving samples from his previous pulmonologist.  I told him that we did not have samples of this medicine however we will refill this for him.  He tells me he does notice some chronic cough as well as some chronic wheeze.  He is able to walk 1 mile and breathe okay.  He has a history of being treated for pneumonias in the past.  Using albuterol inhaler for rescue which he uses at 2 puffs about 3 times per day recently.  He also has been on prednisone for approximately 6 months and currently takes between 5 to 10 mg p.o. daily. \par \par The patient is a current smoker who has smoked for 50 years, 2 packs/day.  He tells me that he currently is down from this quantity.  He was counseled on cessation of cigarette smoking altogether today.  See below.\par \par Denies having recent fever or chills.\par \par Has seasonal allergies and takes loratadine.\par \par \par

## 2020-05-19 NOTE — ASSESSMENT
[FreeTextEntry1] : #1.  COPD.  Patient will continue Stiolto Respimat 2 inhalations/day.  Also Albuterol inhaler as needed for rescue, 2 puffs 4 times daily prn.  He has been on low-dose prednisone, 5 to 10 mg/day, by his previous pulmonologist and hopefully will be able to wean him off of this.  I am requesting the records from his previous pulmonologist, Dr. Montilla. \par \par #2  Cigarette smoker.  he was counseled on cessation of cigarette smoking altogether today.  Screening CT of chest of 7/27/2018 at [facility] showed no nodules. Pt says had another screening CT of chest at [facility] within ? 3-6  months and will have the formal report sent here for our records and disc sent or dropped off here for review. \par \par #3 Migraine HA, chronicn pain, PTSD, Graves disease, hypothyroidism OA. \par \par RV in 4 month or prn. \par \par \par 5/19/20: Addendum:  Pt had screenign CT of chest on 12/16/19 which showed no suspicious nodules. Recommended for next screening CT scan of chest in 12 months. Have ordered, and will mail to pt.

## 2020-05-19 NOTE — PHYSICAL EXAM
[No Acute Distress] : no acute distress [Normal Appearance] : normal appearance [Normal Oropharynx] : normal oropharynx [Normal Rate/Rhythm] : normal rate/rhythm [No Neck Mass] : no neck mass [Normal S1, S2] : normal s1, s2 [No Murmurs] : no murmurs [No Resp Distress] : no resp distress [Clear to Auscultation Bilaterally] : clear to auscultation bilaterally [No Abnormalities] : no abnormalities [Normal Gait] : normal gait [No Clubbing] : no clubbing [Benign] : benign [No Cyanosis] : no cyanosis [No Edema] : no edema [Normal Color/ Pigmentation] : normal color/ pigmentation [TextBox_140] : verbal, communicative

## 2020-05-19 NOTE — COUNSELING
[Cessation strategies including cessation program discussed] : Cessation strategies including cessation program discussed [Risk of tobacco use and health benefits of smoking cessation discussed] : Risk of tobacco use and health benefits of smoking cessation discussed [Encouraged to pick a quit date and identify support needed to quit] : Encouraged to pick a quit date and identify support needed to quit [Use of nicotine replacement therapies and other medications discussed] : Use of nicotine replacement therapies and other medications discussed [Tobacco Use Cessation Intermediate Greater Than 3 Minutes Up to 10 Minutes] : Tobacco Use Cessation Intermediate Greater Than 3 Minutes Up to 10 Minutes [FreeTextEntry1] : 6

## 2020-05-19 NOTE — REVIEW OF SYSTEMS
[Cough] : cough [Wheezing] : wheezing [Fever] : no fever [Chills] : no chills [TextBox_91] : see above [TextBox_119] : h.o. migraine HA [TextBox_148] : see above

## 2020-08-18 ENCOUNTER — APPOINTMENT (OUTPATIENT)
Dept: GASTROENTEROLOGY | Facility: CLINIC | Age: 60
End: 2020-08-18

## 2020-08-24 ENCOUNTER — RX RENEWAL (OUTPATIENT)
Age: 60
End: 2020-08-24

## 2020-10-02 DIAGNOSIS — Z92.3 PERSONAL HISTORY OF IRRADIATION: ICD-10-CM

## 2020-10-02 DIAGNOSIS — Z86.39 PERSONAL HISTORY OF OTHER ENDOCRINE, NUTRITIONAL AND METABOLIC DISEASE: ICD-10-CM

## 2020-10-02 DIAGNOSIS — Z87.898 PERSONAL HISTORY OF OTHER SPECIFIED CONDITIONS: ICD-10-CM

## 2020-10-02 DIAGNOSIS — M25.559 PAIN IN UNSPECIFIED HIP: ICD-10-CM

## 2020-10-02 DIAGNOSIS — N63.0 UNSPECIFIED LUMP IN UNSPECIFIED BREAST: ICD-10-CM

## 2020-10-02 DIAGNOSIS — S76.312A STRAIN OF MUSCLE, FASCIA AND TENDON OF THE POSTERIOR MUSCLE GROUP AT THIGH LEVEL, LEFT THIGH, INITIAL ENCOUNTER: ICD-10-CM

## 2020-10-02 DIAGNOSIS — Z87.442 PERSONAL HISTORY OF URINARY CALCULI: ICD-10-CM

## 2020-10-02 RX ORDER — PREDNISONE 10 MG/1
10 TABLET ORAL
Refills: 0 | Status: DISCONTINUED | COMMUNITY
End: 2020-10-02

## 2020-10-05 ENCOUNTER — APPOINTMENT (OUTPATIENT)
Dept: GASTROENTEROLOGY | Facility: CLINIC | Age: 60
End: 2020-10-05
Payer: MEDICAID

## 2020-10-05 VITALS
SYSTOLIC BLOOD PRESSURE: 110 MMHG | HEART RATE: 82 BPM | DIASTOLIC BLOOD PRESSURE: 77 MMHG | WEIGHT: 103 LBS | HEIGHT: 63 IN | BODY MASS INDEX: 18.25 KG/M2 | TEMPERATURE: 97.1 F

## 2020-10-05 DIAGNOSIS — E03.8 OTHER SPECIFIED HYPOTHYROIDISM: ICD-10-CM

## 2020-10-05 DIAGNOSIS — Z87.19 PERSONAL HISTORY OF OTHER DISEASES OF THE DIGESTIVE SYSTEM: ICD-10-CM

## 2020-10-05 DIAGNOSIS — Z63.5 DISRUPTION OF FAMILY BY SEPARATION AND DIVORCE: ICD-10-CM

## 2020-10-05 DIAGNOSIS — K44.9 DIAPHRAGMATIC HERNIA W/OUT OBSTRUCTION OR GANGRENE: ICD-10-CM

## 2020-10-05 DIAGNOSIS — G43.909 MIGRAINE, UNSPECIFIED, NOT INTRACTABLE, W/OUT STATUS MIGRAINOSUS: ICD-10-CM

## 2020-10-05 PROCEDURE — 99204 OFFICE O/P NEW MOD 45 MIN: CPT

## 2020-10-05 SDOH — SOCIAL STABILITY - SOCIAL INSECURITY: DISRUPTION OF FAMILY BY SEPARATION AND DIVORCE: Z63.5

## 2020-10-05 NOTE — HISTORY OF PRESENT ILLNESS
[FreeTextEntry1] : This 60-year-old gentleman is referred for an evaluation of rectal bleeding which has been present for at least 1 year.  The bleeding has become more frequent over the past 7 months, occurring on a daily basis.  He also reportedly had a positive Cologuard test.  In 2009, the patient had a traumatic colonoscopy, at which time he awakened during the procedure.  According to the patient, he had polyps found at the time of the colonoscopy and has developed PTSD since then.  He has a history of erratic bowel habits, often straining at stool.  He denies significant abdominal pain.  There is no family history of colon polyps or colon cancer.  The patient is very reluctant to have another optical colonoscopy because of the terrible experience that he had in 2009.  He has a history of heartburn and hiatal hernia, and he takes omeprazole 40 mg daily.  He developed an ulcer, presumably in his stomach, related to Mobic.  He has a history of Graves' disease that was treated with radioactive iodine in 2000.  He has a history of Lyme disease in 2019, which was treated.  He still complains of scrambled words and joint pains.  He has a history of migraine syndrome, and syncopal episodes.  He has COPD.  He had sinus surgery in 2009 and left rotator cuff surgery in 2009.  He sees a psychiatrist, who has been treating him with Cymbalta and trazodone.  The patient's father had Alzheimer's disease and his mother had brain cancer.  He has 2 brothers who had Graves' disease and 2 sisters, who are healthy.  He is , and he has 2 children, but does not know about their health status.  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  He is on disability.  He used to smoke 3 to 5 packs of cigarettes a day, but now cut down to 1/4-1/2 a pack of cigarettes a day.  He is drinking less alcohol now than he did before.  He has seasonal allergies.

## 2020-10-05 NOTE — PHYSICAL EXAM
[General Appearance - Alert] : alert [General Appearance - In No Acute Distress] : in no acute distress [General Appearance - Well Developed] : well developed [Sclera] : the sclera and conjunctiva were normal [PERRL With Normal Accommodation] : pupils were equal in size, round, and reactive to light [Extraocular Movements] : extraocular movements were intact [Strabismus] : no strabismus was seen [Outer Ear] : the ears and nose were normal in appearance [Examination Of The Oral Cavity] : the lips and gums were normal [Both Tympanic Membranes Were Examined] : both tympanic membranes were normal [Nasal Cavity] : the nasal mucosa and septum were normal [Oropharynx] : the oropharynx was normal [Neck Appearance] : the appearance of the neck was normal [Neck Cervical Mass (___cm)] : no neck mass was observed [Jugular Venous Distention Increased] : there was no jugular-venous distention [Thyroid Diffuse Enlargement] : the thyroid was not enlarged [Thyroid Nodule] : there were no palpable thyroid nodules [Auscultation Breath Sounds / Voice Sounds] : lungs were clear to auscultation bilaterally [Lungs Percussion] : the lungs were normal to percussion [Heart Rate And Rhythm] : heart rate was normal and rhythm regular [Heart Sounds] : normal S1 and S2 [Heart Sounds Gallop] : no gallops [Murmurs] : no murmurs [Heart Sounds Pericardial Friction Rub] : no pericardial rub [Arterial Pulses Carotid] : carotid pulses were normal with no bruits [Abdominal Aorta] : the abdominal aorta was normal [Full Pulse] : the pedal pulses are present [Edema] : there was no peripheral edema [Veins - Varicosity Changes] : there were no varicosital changes [Bowel Sounds] : normal bowel sounds [Abdomen Soft] : soft [Abdomen Tenderness] : non-tender [Abdomen Mass (___ Cm)] : no abdominal mass palpated [Abdomen Hernia] : no hernia was discovered [Penis Abnormality] : the penis was normal [Scrotum] : the scrotum was normal [Testes Swelling] : the testicles were not swollen [Testes Mass (___cm)] : there were no testicular masses [Cervical Lymph Nodes Enlarged Posterior Bilaterally] : posterior cervical [Cervical Lymph Nodes Enlarged Anterior Bilaterally] : anterior cervical [Supraclavicular Lymph Nodes Enlarged Bilaterally] : supraclavicular [Axillary Lymph Nodes Enlarged Bilaterally] : axillary [Femoral Lymph Nodes Enlarged Bilaterally] : femoral [Inguinal Lymph Nodes Enlarged Bilaterally] : inguinal [No CVA Tenderness] : no ~M costovertebral angle tenderness [No Spinal Tenderness] : no spinal tenderness [Abnormal Walk] : normal gait [Nail Clubbing] : no clubbing  or cyanosis of the fingernails [Involuntary Movements] : no involuntary movements were seen [Musculoskeletal - Swelling] : no joint swelling seen [Motor Tone] : muscle strength and tone were normal [Skin Color & Pigmentation] : normal skin color and pigmentation [Skin Turgor] : normal skin turgor [] : no rash [Skin Lesions] : no skin lesions [No Focal Deficits] : no focal deficits [Oriented To Time, Place, And Person] : oriented to person, place, and time [FreeTextEntry1] : Severely anxious

## 2020-10-05 NOTE — REVIEW OF SYSTEMS
[Feeling Poorly] : feeling poorly [Feeling Tired] : feeling tired [Eyesight Problems] : eyesight problems [Nasal Discharge] : nasal discharge [Hoarseness] : hoarseness [Shortness Of Breath] : shortness of breath [Wheezing] : wheezing [Cough] : cough [Orthopnea] : orthopnea [PND] : PND [Abdominal Pain] : abdominal pain [Constipation] : constipation [Diarrhea] : diarrhea [Arthralgias] : arthralgias [Joint Pain] : joint pain [Joint Swelling] : joint swelling [Joint Stiffness] : joint stiffness [Limb Pain] : limb pain [Limb Swelling] : limb swelling [Confused] : confusion [Dizziness] : dizziness [Fainting] : fainting [Limb Weakness] : limb weakness [Difficulty Walking] : difficulty walking [Sleep Disturbances] : sleep disturbances [Depression] : depression [Muscle Weakness] : muscle weakness [Feelings Of Weakness] : feelings of weakness [Negative] : Heme/Lymph [FreeTextEntry7] : rectal bleeding

## 2020-10-05 NOTE — CONSULT LETTER
[Dear  ___] : Dear  [unfilled], [Consult Letter:] : I had the pleasure of evaluating your patient, [unfilled]. [( Thank you for referring [unfilled] for consultation for _____ )] : Thank you for referring [unfilled] for consultation for [unfilled] [Please see my note below.] : Please see my note below. [Consult Closing:] : Thank you very much for allowing me to participate in the care of this patient.  If you have any questions, please do not hesitate to contact me. [Sincerely,] : Sincerely, [FreeTextEntry3] : Connor Nicole MD

## 2020-10-05 NOTE — ASSESSMENT
[FreeTextEntry1] : 1.  Rectal bleeding-because the patient reports bright red blood per rectum and refuses a rectal examination, it is difficult to determine the differential diagnosis at this time.  It is possible that his bleeding is secondary to internal hemorrhoids, but equally possible that he has a bleeding lesion, such as a polyp or colon cancer.  Angiodysplasia and diverticular bleeding are less likely.\par 2.  History of colonic polyps that were never removed.\par 3.  GERD and hiatal hernia.\par 4.  Suspect irritable bowel syndrome based on the history of erratic bowel habits.\par 5.  History of Graves' disease with subsequent hypothyroidism following radioactive iodine.\par 6.  Lyme disease.\par 7.  COPD-history of current smoking and heavier smoker in the past.\par 8.  Migraine syndrome.\par 9.  Posttraumatic stress disorder.\par \par Plan:\par 1.  I had extensive conversation was had with the patient with regard to the options to determine the cause for his rectal bleeding.  I reviewed the rationale for performing an optical colonoscopy, CT colonography and barium enema.  I also discussed the colon capsule procedure, which I do not perform.  The patient was advised to research obtaining the colon capsule procedure from another practitioner if so desired.  He understands that colon preparation with purgatives are necessary for all of the procedures mentioned.\par 2.  Blood test results were reviewed.

## 2020-10-08 ENCOUNTER — APPOINTMENT (OUTPATIENT)
Dept: PHYSICAL MEDICINE AND REHAB | Facility: CLINIC | Age: 60
End: 2020-10-08
Payer: MEDICAID

## 2020-10-08 VITALS
HEIGHT: 63 IN | HEART RATE: 88 BPM | DIASTOLIC BLOOD PRESSURE: 77 MMHG | WEIGHT: 103 LBS | SYSTOLIC BLOOD PRESSURE: 109 MMHG | BODY MASS INDEX: 18.25 KG/M2

## 2020-10-08 VITALS — TEMPERATURE: 99.1 F

## 2020-10-08 PROCEDURE — 20611 DRAIN/INJ JOINT/BURSA W/US: CPT | Mod: LT

## 2020-10-08 PROCEDURE — 99213 OFFICE O/P EST LOW 20 MIN: CPT | Mod: 25

## 2020-10-08 NOTE — ASSESSMENT
[FreeTextEntry1] : 60 y.o. M w/ h/o COPD, Graves disease and chronic Lyme disease/syndrome presents w/ cervical DDD/ spondylosis and b/l RC tendinopathy/tears.  LEFT SHOULDER SASD CSI successfully administered at today's office visit.  Again cautioned patient regarding chronic use of PO NSAIDs 2' GI/cardiac/renal toxicities.   Gave new Rx for P.T. to focus on RC strengthening.  May consider Ortho consult if necessary.  RTC 6-8 weeks.

## 2020-10-08 NOTE — PROCEDURE
[de-identified] : ANTERIOR SHOULDER\par \par LONG HEAD BICEPS TENDON IN BICIPITAL GROOVE - longitudinal and split tear; increased Doppler flow\par SUBSCAPULARIS TENDON - partial thickness longitudinal tear myotendinous junction\par CONJOINT TENDON OF CORACOBRACHIALIS AND SHBT - intact\par \par \par LATERAL SHOULDER\par \par ROTATOR INTERVAL (TRANSVERSE AXIS)\par  - SGHL - difficult to visualize\par  - CHL - difficult to visualize\par  - Supraspinatus tendon\par  - Subscapularis tendon\par \par SUPRASPINATUS TENDON \par \par LONG AXIS\par \par  - SUBACROMIAL/SUBDELTOID BURSA  - minimal fluid; no distention\par  - DISTAL FOOTPRINT - tendinopathic changes w/o retraction\par  - BURSAL SURFACE TEAR - large partial thickness tear w/ cortical irregularities\par  - ARTICULAR SURFACE TEAR - \par  - INTRASUBSTSANCE TEAR - small tear\par  \par TRANSVERSE AXIS\par \par  - BURSAL SURFACE TEAR\par  - ARTICULAR SURFACE TEAR - high grade partial thickness tear\par  - INTRASUBSTANCE TEAR \par \par REASON FOR PROCEDURE: LEFT SHOULDER PAIN\par \par Procedure: LEFT SHOULDER SASD BURSAL CSI WITH US GUIDANCE\par Physician: DAVE GOODEN D.O.\par Medication injected: 40 MG Kenalog, 2 CC Lidocaine 1%\par Sedation medications: None\par Estimated blood loss: None\par Complications: None\par \par Technique: R/B/A to US-guided LEFT SHOULDER SASD corticosteroid injection were reviewed.  The patient is agreeable and wishes to proceed.  Signed consent form to be scanned into EMR. The patient was placed in seated position. Utilizing ultrasound, the subacromial bursa, deltoid, supraspinatus insertion, humeral head, overlying vessels were identified. The area was prepped in normal sterile fashion with Chloroprep x3.  A 22 gauge, 1.5 inch needle was advanced with sonographic guidance toward the hypoechoic line between the deltoid and supraspinatus.  After negative aspiration of heme, the above medications were injected into the LEFT SASD BURSA with visualization.  Needle was then removed and bandaid placed over injection site.  There were no complications. The patient was provided with post injection instructions and noted improvement in pain and ROM after injection.\par \par \par

## 2020-10-08 NOTE — HISTORY OF PRESENT ILLNESS
[FreeTextEntry1] : 60 y.o. M w/ h/o COPD, Graves disease and chronic Lyme disease/syndrome, Cervical DDD/spondylosis returns to office for f/u b/l shoulder pain/RC tendinopathy.  Pt. not able to do P.T. 2' COVID.  Left shoulder is bothering him more.

## 2020-10-20 ENCOUNTER — OUTPATIENT (OUTPATIENT)
Dept: OUTPATIENT SERVICES | Facility: HOSPITAL | Age: 60
LOS: 1 days | Discharge: HOME | End: 2020-10-20

## 2020-10-20 DIAGNOSIS — D72.820 LYMPHOCYTOSIS (SYMPTOMATIC): ICD-10-CM

## 2020-10-21 ENCOUNTER — RESULT REVIEW (OUTPATIENT)
Age: 60
End: 2020-10-21

## 2020-10-21 ENCOUNTER — APPOINTMENT (OUTPATIENT)
Dept: HEMATOLOGY ONCOLOGY | Facility: CLINIC | Age: 60
End: 2020-10-21
Payer: MEDICAID

## 2020-10-21 ENCOUNTER — OUTPATIENT (OUTPATIENT)
Dept: OUTPATIENT SERVICES | Facility: HOSPITAL | Age: 60
LOS: 1 days | End: 2020-10-21
Payer: MEDICAID

## 2020-10-21 VITALS
RESPIRATION RATE: 16 BRPM | HEIGHT: 63 IN | BODY MASS INDEX: 18.61 KG/M2 | DIASTOLIC BLOOD PRESSURE: 74 MMHG | WEIGHT: 105 LBS | TEMPERATURE: 97.2 F | SYSTOLIC BLOOD PRESSURE: 108 MMHG | HEART RATE: 85 BPM

## 2020-10-21 DIAGNOSIS — D72.820 LYMPHOCYTOSIS (SYMPTOMATIC): ICD-10-CM

## 2020-10-21 PROCEDURE — 99205 OFFICE O/P NEW HI 60 MIN: CPT

## 2020-10-21 PROCEDURE — 87205 SMEAR GRAM STAIN: CPT

## 2020-10-21 PROCEDURE — 88184 FLOWCYTOMETRY/ TC 1 MARKER: CPT

## 2020-10-21 PROCEDURE — 88185 FLOWCYTOMETRY/TC ADD-ON: CPT

## 2020-10-21 RX ORDER — DICLOFENAC SODIUM 10 MG/G
1 GEL TOPICAL DAILY
Refills: 0 | Status: ACTIVE | COMMUNITY
Start: 2020-10-21

## 2020-10-21 RX ORDER — NICOTINE 21 MG/24HR
14 PATCH, TRANSDERMAL 24 HOURS TRANSDERMAL DAILY
Refills: 0 | Status: ACTIVE | COMMUNITY
Start: 2020-10-21

## 2020-10-21 RX ORDER — HYDROXYZINE HYDROCHLORIDE 25 MG/1
25 TABLET ORAL TWICE DAILY
Refills: 0 | Status: ACTIVE | COMMUNITY
Start: 2020-10-21

## 2020-10-21 RX ORDER — MULTIVIT-MIN/FOLIC/VIT K/LYCOP 400-300MCG
50 MCG TABLET ORAL
Refills: 0 | Status: ACTIVE | COMMUNITY
Start: 2020-10-21

## 2020-10-21 RX ORDER — DULOXETINE HYDROCHLORIDE 60 MG/1
60 CAPSULE, DELAYED RELEASE PELLETS ORAL
Refills: 0 | Status: ACTIVE | COMMUNITY
Start: 2020-10-21

## 2020-10-21 RX ORDER — LEVOTHYROXINE SODIUM 0.12 MG/1
125 TABLET ORAL DAILY
Refills: 0 | Status: ACTIVE | COMMUNITY

## 2020-10-21 RX ORDER — CYCLOBENZAPRINE HYDROCHLORIDE 10 MG/1
10 TABLET, FILM COATED ORAL
Refills: 0 | Status: ACTIVE | COMMUNITY
Start: 2020-10-21

## 2020-10-21 RX ORDER — LORATADINE 10 MG/1
10 TABLET ORAL DAILY
Refills: 0 | Status: ACTIVE | COMMUNITY
Start: 2020-10-21

## 2020-10-21 RX ORDER — SUMATRIPTAN 100 MG/1
100 TABLET, FILM COATED ORAL
Refills: 0 | Status: ACTIVE | COMMUNITY
Start: 2020-10-21

## 2020-10-21 RX ORDER — IPRATROPIUM BROMIDE 0.5 MG/2.5ML
0.02 SOLUTION RESPIRATORY (INHALATION) 4 TIMES DAILY
Refills: 0 | Status: ACTIVE | COMMUNITY
Start: 2020-10-21

## 2020-10-21 RX ORDER — FLUTICASONE PROPIONATE 50 UG/1
50 SPRAY, METERED NASAL TWICE DAILY
Refills: 1 | Status: ACTIVE | COMMUNITY
Start: 2020-10-21

## 2020-10-21 NOTE — CONSULT LETTER
[Dear  ___] : Dear  [unfilled], [( Thank you for referring [unfilled] for consultation for _____ )] : Thank you for referring [unfilled] for consultation for [unfilled] [Please see my note below.] : Please see my note below. [Sincerely,] : Sincerely, [FreeTextEntry2] : Dr June Eddy [FreeTextEntry3] : Chioma Castaneda

## 2020-10-21 NOTE — REVIEW OF SYSTEMS
[Fatigue] : fatigue [Shortness Of Breath] : shortness of breath [Joint Pain] : joint pain [Joint Stiffness] : joint stiffness [Fainting] : fainting [Negative] : Psychiatric [FreeTextEntry2] : lost weight but now stable  [FreeTextEntry4] : per HPI  [FreeTextEntry7] : per HPI  [de-identified] : per HPI

## 2020-10-21 NOTE — ASSESSMENT
[FreeTextEntry1] : 61 y/o male with multiple medical problems, now with persistent lymphocytosis. 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

## 2020-10-21 NOTE — HISTORY OF PRESENT ILLNESS
[de-identified] : 59 y/o male noted to have elevated lymphocyte count on CBC in June 2020 ( WBC 14.5  with 54 % lymph. In September 2020- WBC 15.9  with lymph 58 % .  In Feb 2020- WBC was elevated ~ 14- but   neutrophilia, no lymphocytosis.\par Noted swollen gland R neck few months ago- comes and goes, now not palpable.\par Has multiple medical problems: chronic fatigue, Hx of Lyme disease, weight loss- for many years, stable at 105 in past year but unable to gain weight back, intermittent LLL abd pain, blood in stool, refused colonoscopy recently because of  bad experience with colonoscopy many years ago. has no teeth, bone loss in jaw, no dentures. PTSD. Chronic shoulder pains for > 10 years, rotator tears. On disability.\par Former heavy smoker ( 3-5 packs per day) now cut back to < 1/2 pack per day. Had CT chest Dec 2019- questionable pulm nodule, follow up CT planned for Dec 2020. Hx of Graves disease , s/p CHURCHILL, now on synthroid. \par Chronic sinus infections- told that needs surgery. Syncopal episodes ? etiology. Migraines.

## 2020-10-22 PROCEDURE — 88189 FLOWCYTOMETRY/READ 16 & >: CPT

## 2020-10-26 LAB — TM INTERPRETATION: SIGNIFICANT CHANGE UP

## 2020-11-03 ENCOUNTER — APPOINTMENT (OUTPATIENT)
Dept: HEMATOLOGY ONCOLOGY | Facility: CLINIC | Age: 60
End: 2020-11-03
Payer: MEDICAID

## 2020-11-03 VITALS
HEIGHT: 63 IN | SYSTOLIC BLOOD PRESSURE: 123 MMHG | DIASTOLIC BLOOD PRESSURE: 86 MMHG | HEART RATE: 97 BPM | WEIGHT: 105 LBS | BODY MASS INDEX: 18.61 KG/M2 | TEMPERATURE: 99.1 F | RESPIRATION RATE: 18 BRPM

## 2020-11-03 PROCEDURE — 99214 OFFICE O/P EST MOD 30 MIN: CPT

## 2020-11-03 NOTE — REASON FOR VISIT
[Follow-Up Visit] : a follow-up visit for [FreeTextEntry2] : lymphocytosis  here to discuss results of blood work

## 2020-11-03 NOTE — REVIEW OF SYSTEMS
[Fatigue] : fatigue [Shortness Of Breath] : shortness of breath [Joint Pain] : joint pain [Joint Stiffness] : joint stiffness [Fainting] : fainting [Negative] : Psychiatric [Recent Change In Weight] : ~T recent weight change [FreeTextEntry2] : lost weight  [FreeTextEntry4] : per HPI  [FreeTextEntry7] : per HPI  [de-identified] : per HPI

## 2020-11-03 NOTE — HISTORY OF PRESENT ILLNESS
[de-identified] : 59 y/o male noted to have elevated lymphocyte count on CBC in June 2020 ( WBC 14.5  with 54 % lymph. In September 2020- WBC 15.9  with lymph 58 % . \par .\par Noted swollen gland R neck few months ago- comes and goes, now not palpable.\par Has multiple medical problems: chronic fatigue, Hx of Lyme disease, weight loss- for many years, stable at 105 in past year but unable to gain weight back, intermittent LLL abd pain, blood in stool, refused colonoscopy recently because of  bad experience with colonoscopy many years ago. has no teeth, bone loss in jaw, no dentures. PTSD. Chronic shoulder pains for > 10 years, rotator tears. On disability.\par Former heavy smoker ( 3-5 packs per day) now cut back to < 1/2 pack per day. Had CT chest Dec 2019- questionable pulm nodule, follow up CT planned for Dec 2020. Hx of Graves disease , s/p CHURCHILL, now on synthroid. \par Chronic sinus infections- told that needs surgery. Syncopal episodes ? etiology. Migraines. \par \par Blood work done in our office 10/21/20 : showed monotypic B cells 38 % ot total with the phenotype of  chronic lymphocytic leukemia/ small lymphocytic lymphoma.  Currently absolute B cell monoclonal lymphocytosis is       5500 K - meets criteria for CLL

## 2020-11-03 NOTE — ASSESSMENT
[FreeTextEntry1] : 59 y/o male with multiple medical problems.\par \par Persistent lymphocytosis. Flow cytometry c/w CLL/ SLL.\par In view of his multiple systemic symptoms - especially weight loss - will obtain PET/CT to r/o significant gemma disease.\par \par Plan : PET scan.\par \par Return visit after PET scan.\par \par Discussed diagnosis, clinical symptoms and indications for treatment. Discussed monitoring .

## 2020-11-06 ENCOUNTER — APPOINTMENT (OUTPATIENT)
Dept: PHYSICAL MEDICINE AND REHAB | Facility: CLINIC | Age: 60
End: 2020-11-06

## 2020-11-11 ENCOUNTER — APPOINTMENT (OUTPATIENT)
Dept: INTERNAL MEDICINE | Facility: CLINIC | Age: 60
End: 2020-11-11
Payer: MEDICAID

## 2020-11-11 PROCEDURE — 99072 ADDL SUPL MATRL&STAF TM PHE: CPT

## 2020-11-11 PROCEDURE — 99214 OFFICE O/P EST MOD 30 MIN: CPT | Mod: 25

## 2020-11-11 PROCEDURE — 99406 BEHAV CHNG SMOKING 3-10 MIN: CPT

## 2020-11-11 NOTE — PHYSICAL EXAM
[No Acute Distress] : no acute distress [Normal Oropharynx] : normal oropharynx [Normal Appearance] : normal appearance [No Neck Mass] : no neck mass [Normal Rate/Rhythm] : normal rate/rhythm [Normal S1, S2] : normal s1, s2 [No Resp Distress] : no resp distress [Clear to Auscultation Bilaterally] : clear to auscultation bilaterally [No Abnormalities] : no abnormalities [Benign] : benign [Normal Gait] : normal gait [No Clubbing] : no clubbing [No Cyanosis] : no cyanosis [No Edema] : no edema [Normal Color/ Pigmentation] : normal color/ pigmentation [No Focal Deficits] : no focal deficits [TextBox_68] : Decreased aud BS's bilat. [TextBox_140] : verbal

## 2020-11-11 NOTE — REVIEW OF SYSTEMS
[Fever] : no fever [Chills] : no chills [Negative] : Endocrine [TextBox_3] : see above [TextBox_30] : see above [TextBox_110] : see above

## 2020-11-11 NOTE — HISTORY OF PRESENT ILLNESS
[TextBox_4] : This is a 60-year-old male with a history of COPD, who continues to smoke cigarettes currently a half a pack per day the 100-pack-year history of cigarette smoking.  He will have a following annual CT screening CAT scan of chest in December.  His cough and wheezing remained about the same.  He is not having hemoptysis.  He is maintained on Stiolto and continues prednisone 5 to 10 mg/day.  Continues with his rescue medicine on an as-needed basis.  He is now not having recent fever or chills.\par \par Recently diagnosed by hematology, Dr. Rose, as having CLL.  Given his history of weight loss, she is requesting for the patient to have a PET CT scan.

## 2020-11-11 NOTE — ASSESSMENT
[FreeTextEntry1] : #1  COPD.  He was counseled on smoking cessation again today.  See above.  He will continue his current medical regimen.  Ordered for [place] to have full pulmonary function testing.\par \par #2  Current cigarette smoker.  See above.  He will have his annual screening CT scan of chest in December.\par \par #3  Recently diagnosed CLL.  Continues to follow with hematology/oncology Dr. Bruner. \par \par Return pulmonary appointment in 1 year.  Pt knows to call or return at any time should there be any increase in symptoms or clinical change.

## 2020-11-11 NOTE — COUNSELING
[Risk of tobacco use and health benefits of smoking cessation discussed] : Risk of tobacco use and health benefits of smoking cessation discussed [Cessation strategies including cessation program discussed] : Cessation strategies including cessation program discussed [Use of nicotine replacement therapies and other medications discussed] : Use of nicotine replacement therapies and other medications discussed [Willing to Quit Smoking] : Willing to quit smoking [Tobacco Use Cessation Intermediate Greater Than 3 Minutes Up to 10 Minutes] : Tobacco Use Cessation Intermediate Greater Than 3 Minutes Up to 10 Minutes [FreeTextEntry1] : 4

## 2020-12-11 ENCOUNTER — APPOINTMENT (OUTPATIENT)
Dept: INTERNAL MEDICINE | Facility: CLINIC | Age: 60
End: 2020-12-11
Payer: MEDICAID

## 2020-12-11 VITALS
BODY MASS INDEX: 19.14 KG/M2 | HEIGHT: 63 IN | DIASTOLIC BLOOD PRESSURE: 78 MMHG | WEIGHT: 108 LBS | TEMPERATURE: 98.5 F | SYSTOLIC BLOOD PRESSURE: 108 MMHG | RESPIRATION RATE: 18 BRPM | OXYGEN SATURATION: 97 % | HEART RATE: 93 BPM

## 2020-12-11 PROCEDURE — 94010 BREATHING CAPACITY TEST: CPT

## 2020-12-11 PROCEDURE — 94729 DIFFUSING CAPACITY: CPT

## 2020-12-11 PROCEDURE — 94727 GAS DIL/WSHOT DETER LNG VOL: CPT

## 2020-12-11 PROCEDURE — 99072 ADDL SUPL MATRL&STAF TM PHE: CPT

## 2020-12-16 ENCOUNTER — OUTPATIENT (OUTPATIENT)
Dept: OUTPATIENT SERVICES | Facility: HOSPITAL | Age: 60
LOS: 1 days | End: 2020-12-16
Payer: MEDICAID

## 2020-12-16 ENCOUNTER — APPOINTMENT (OUTPATIENT)
Dept: CT IMAGING | Facility: CLINIC | Age: 60
End: 2020-12-16
Payer: MEDICAID

## 2020-12-16 ENCOUNTER — RX RENEWAL (OUTPATIENT)
Age: 60
End: 2020-12-16

## 2020-12-16 DIAGNOSIS — C91.10 CHRONIC LYMPHOCYTIC LEUKEMIA OF B-CELL TYPE NOT HAVING ACHIEVED REMISSION: ICD-10-CM

## 2020-12-16 DIAGNOSIS — Z00.8 ENCOUNTER FOR OTHER GENERAL EXAMINATION: ICD-10-CM

## 2020-12-16 PROCEDURE — 74177 CT ABD & PELVIS W/CONTRAST: CPT | Mod: 26

## 2020-12-16 PROCEDURE — 74177 CT ABD & PELVIS W/CONTRAST: CPT

## 2020-12-16 PROCEDURE — 70491 CT SOFT TISSUE NECK W/DYE: CPT

## 2020-12-16 PROCEDURE — 71260 CT THORAX DX C+: CPT | Mod: 26

## 2020-12-16 PROCEDURE — 70491 CT SOFT TISSUE NECK W/DYE: CPT | Mod: 26

## 2020-12-16 PROCEDURE — 71260 CT THORAX DX C+: CPT

## 2020-12-16 RX ORDER — PREDNISONE 10 MG/1
10 TABLET ORAL
Qty: 30 | Refills: 1 | Status: ACTIVE | COMMUNITY
Start: 2020-05-18 | End: 1900-01-01

## 2021-01-09 ENCOUNTER — OUTPATIENT (OUTPATIENT)
Dept: OUTPATIENT SERVICES | Facility: HOSPITAL | Age: 61
LOS: 1 days | Discharge: HOME | End: 2021-01-09

## 2021-01-09 DIAGNOSIS — D72.820 LYMPHOCYTOSIS (SYMPTOMATIC): ICD-10-CM

## 2021-01-12 ENCOUNTER — APPOINTMENT (OUTPATIENT)
Dept: PHYSICAL MEDICINE AND REHAB | Facility: CLINIC | Age: 61
End: 2021-01-12
Payer: MEDICAID

## 2021-01-12 VITALS
RESPIRATION RATE: 14 BRPM | HEIGHT: 63 IN | DIASTOLIC BLOOD PRESSURE: 74 MMHG | HEART RATE: 86 BPM | BODY MASS INDEX: 18.25 KG/M2 | OXYGEN SATURATION: 98 % | WEIGHT: 103 LBS | TEMPERATURE: 98 F | SYSTOLIC BLOOD PRESSURE: 108 MMHG

## 2021-01-12 DIAGNOSIS — G89.4 CHRONIC PAIN SYNDROME: ICD-10-CM

## 2021-01-12 DIAGNOSIS — M54.12 RADICULOPATHY, CERVICAL REGION: ICD-10-CM

## 2021-01-12 PROCEDURE — 99072 ADDL SUPL MATRL&STAF TM PHE: CPT

## 2021-01-12 PROCEDURE — 99213 OFFICE O/P EST LOW 20 MIN: CPT

## 2021-01-12 NOTE — ASSESSMENT
[FreeTextEntry1] : 61 y.o. M w/ h/o COPD, Graves disease, CLL, chronic Lyme disease/syndrome, cervical DDD/spondylosis and b/l RC tendinopathy/tears.  Pt. is not good candidate for repeat CSI or regenerative medicine procedure.  Again cautioned patient regarding chronic use of PO NSAIDs 2' GI/cardiac/renal toxicities.   Will refer patient to Ortho for consideration of left RC tendon repair, as well as chronic pain management for optimal medication management given complexity of his medical issues.  Advised pt. to go to ED if his pain becomes worse.  RTC prn.

## 2021-01-12 NOTE — HISTORY OF PRESENT ILLNESS
[FreeTextEntry1] : 61 y.o. M w/ h/o COPD, Graves disease and chronic Lyme disease/syndrome, cervical DDD/ spondylosis and b/l RC tendinopathy/tears returns to office s/p LEFT SHOULDER SASD CSI.  Pt. reports 3 days relief post-procedure. Pt. only had initial consultation w/ P.T. but has been trying to do HEP.  Pt. still has difficulty performing self care ADLs using left arm.  Pt. is now c/o posterior right shoulder pain.  He is taking SMR which is not helpful.  He is also alternating between OTC naproxen and motrin.  Sees psychiatrist who Rx's trazodone and duloxetine.

## 2021-01-12 NOTE — PHYSICAL EXAM
[FreeTextEntry1] : Mild distress 2' pain\par A&Ox3\par Non-obese\par C-spine ROM: 2 FBs chin-to-sternum; 10' extension; 40' LR to either side\par Webb's: neg\par Lhermitte's: neg\par Spurling's: deferred\par DTR's: depressed biceps b/l; 2+ triceps and BR reflexes\par MMT: poor effort\par Sensation: SILT\par Palpation: B/L sup trap and cervical paraspinal muscle TTP\par ROM Shoulders: 70' LEFT ABD before pain; 45' RIGHT ABD/FF w/ pain\par Neer's: + b/l\par Hawkin's: + b/l\par Drop Arm: neg\par Scarf: deferred\par RC MMT: 4/5 pain-related weakness SS; 5-/5 B/L IS\par Palpation: TTP B/L distal SS tendon insertion sites\par

## 2021-01-13 ENCOUNTER — RESULT REVIEW (OUTPATIENT)
Age: 61
End: 2021-01-13

## 2021-01-13 ENCOUNTER — APPOINTMENT (OUTPATIENT)
Dept: HEMATOLOGY ONCOLOGY | Facility: CLINIC | Age: 61
End: 2021-01-13
Payer: MEDICAID

## 2021-01-13 ENCOUNTER — APPOINTMENT (OUTPATIENT)
Dept: INTERNAL MEDICINE | Facility: CLINIC | Age: 61
End: 2021-01-13
Payer: MEDICAID

## 2021-01-13 VITALS
RESPIRATION RATE: 16 BRPM | HEART RATE: 80 BPM | SYSTOLIC BLOOD PRESSURE: 104 MMHG | WEIGHT: 110 LBS | TEMPERATURE: 97.9 F | HEIGHT: 63 IN | OXYGEN SATURATION: 95 % | BODY MASS INDEX: 19.49 KG/M2 | DIASTOLIC BLOOD PRESSURE: 70 MMHG

## 2021-01-13 VITALS
DIASTOLIC BLOOD PRESSURE: 73 MMHG | HEIGHT: 63 IN | SYSTOLIC BLOOD PRESSURE: 109 MMHG | RESPIRATION RATE: 16 BRPM | HEART RATE: 86 BPM | BODY MASS INDEX: 19.84 KG/M2 | TEMPERATURE: 96 F | WEIGHT: 112 LBS

## 2021-01-13 DIAGNOSIS — F17.200 NICOTINE DEPENDENCE, UNSPECIFIED, UNCOMPLICATED: ICD-10-CM

## 2021-01-13 DIAGNOSIS — M19.90 UNSPECIFIED OSTEOARTHRITIS, UNSPECIFIED SITE: ICD-10-CM

## 2021-01-13 DIAGNOSIS — K21.9 GASTRO-ESOPHAGEAL REFLUX DISEASE W/OUT ESOPHAGITIS: ICD-10-CM

## 2021-01-13 DIAGNOSIS — R09.81 NASAL CONGESTION: ICD-10-CM

## 2021-01-13 DIAGNOSIS — J44.9 CHRONIC OBSTRUCTIVE PULMONARY DISEASE, UNSPECIFIED: ICD-10-CM

## 2021-01-13 DIAGNOSIS — F43.10 POST-TRAUMATIC STRESS DISORDER, UNSPECIFIED: ICD-10-CM

## 2021-01-13 PROCEDURE — 99214 OFFICE O/P EST MOD 30 MIN: CPT

## 2021-01-13 PROCEDURE — 99072 ADDL SUPL MATRL&STAF TM PHE: CPT

## 2021-01-13 PROCEDURE — 99406 BEHAV CHNG SMOKING 3-10 MIN: CPT

## 2021-01-13 PROCEDURE — 99214 OFFICE O/P EST MOD 30 MIN: CPT | Mod: 25

## 2021-01-13 RX ORDER — IPRATROPIUM BROMIDE 21 UG/1
0.03 SPRAY NASAL TWICE DAILY
Refills: 0 | Status: DISCONTINUED | COMMUNITY
Start: 2020-10-21 | End: 2021-01-13

## 2021-01-13 RX ORDER — DOCUSATE SODIUM 100 MG/1
100 CAPSULE, LIQUID FILLED ORAL TWICE DAILY
Refills: 0 | Status: DISCONTINUED | COMMUNITY
Start: 2020-10-21 | End: 2021-01-13

## 2021-01-13 NOTE — ASSESSMENT
[FreeTextEntry1] : #1  COPD.  Patient will continue Stiolto Respimat, prednisone 5 to 10 mg p.o. daily, and rescue as needed albuterol inhaler.  He was counseled today again on cessation of cigarette smoking.  See above.  Return pulmonary appointment in 6 months or as needed.\par \par #2 CLL.  Continue following with Dr. Castaneda.\par \par #3 Hypothyroidism. \par \par #4 GERD.\par

## 2021-01-13 NOTE — REVIEW OF SYSTEMS
[Negative] : Endocrine [Fever] : no fever [Chills] : no chills [TextBox_30] : see above [TextBox_148] : see above

## 2021-01-13 NOTE — PHYSICAL EXAM
[No Acute Distress] : no acute distress [Normal Oropharynx] : normal oropharynx [Normal Appearance] : normal appearance [No Neck Mass] : no neck mass [Normal Rate/Rhythm] : normal rate/rhythm [Normal S1, S2] : normal s1, s2 [No Murmurs] : no murmurs [No Resp Distress] : no resp distress [Clear to Auscultation Bilaterally] : clear to auscultation bilaterally [No Abnormalities] : no abnormalities [Benign] : benign [Normal Gait] : normal gait [No Clubbing] : no clubbing [No Cyanosis] : no cyanosis [No Edema] : no edema [FROM] : FROM [Normal Color/ Pigmentation] : normal color/ pigmentation [No Focal Deficits] : no focal deficits [Oriented x3] : oriented x3 [Normal Affect] : normal affect [TextBox_68] : decreased aud BS's.

## 2021-01-13 NOTE — PHYSICAL EXAM
[Thin] : thin [Normal] : affect appropriate [de-identified] : edentulous  [de-identified] : palpable small cervical LNs R

## 2021-01-13 NOTE — COUNSELING
[Risk of tobacco use and health benefits of smoking cessation discussed] : Risk of tobacco use and health benefits of smoking cessation discussed [Cessation strategies including cessation program discussed] : Cessation strategies including cessation program discussed [Encouraged to pick a quit date and identify support needed to quit] : Encouraged to pick a quit date and identify support needed to quit [Willing to Quit Smoking] : Willing to quit smoking [Tobacco Use Cessation Intermediate Greater Than 3 Minutes Up to 10 Minutes] : Tobacco Use Cessation Intermediate Greater Than 3 Minutes Up to 10 Minutes [FreeTextEntry1] : 5

## 2021-01-13 NOTE — REVIEW OF SYSTEMS
[Fatigue] : fatigue [Recent Change In Weight] : ~T recent weight change [Shortness Of Breath] : shortness of breath [Joint Pain] : joint pain [Joint Stiffness] : joint stiffness [Fainting] : fainting [Negative] : Psychiatric [FreeTextEntry2] : lost weight  [FreeTextEntry4] : per HPI  [FreeTextEntry7] : per HPI  [de-identified] : per HPI

## 2021-01-13 NOTE — ASSESSMENT
[FreeTextEntry1] : 61 y/o male with multiple medical problems.\par \par Recently diagnosed with stage 0 CLL.\par Current increase in WBC is due to neutrophilia likely from prednisone. His lymphocyte count is stable, no anemia or thrombocytopenia, no adenopathy.\par No hematologic intervention necessary.\par Monitor clinically and blood work.\par Return visit in 6 months / sooner PRN. He follows with multiple specialist for his complex medical issues.

## 2021-01-13 NOTE — HISTORY OF PRESENT ILLNESS
[de-identified] : 59 y/o male noted to have elevated lymphocyte count on CBC in June 2020 ( WBC 14.5  with 54 % lymph. In September 2020- WBC 15.9  with lymph 58 % . \par .\par Noted swollen gland R neck few months ago- comes and goes, now not palpable.\par Has multiple medical problems: chronic fatigue, Hx of Lyme disease, weight loss- for many years, stable at 105 in past year but unable to gain weight back, intermittent LLL abd pain, blood in stool, refused colonoscopy recently because of  bad experience with colonoscopy many years ago. has no teeth, bone loss in jaw, no dentures. PTSD. Chronic shoulder pains for > 10 years, rotator tears. On disability.\par Former heavy smoker ( 3-5 packs per day) now cut back to < 1/2 pack per day. Had CT chest Dec 2019- questionable pulm nodule, follow up CT planned for Dec 2020. Hx of Graves disease , s/p CHURCHILL, now on synthroid. \par Chronic sinus infections- told that needs surgery. Syncopal episodes ? etiology. Migraines. \par \par Blood work done in our office 10/21/20 : showed monotypic B cells 38 % ot total with the phenotype of  chronic lymphocytic leukemia/ small lymphocytic lymphoma.  Currently absolute B cell monoclonal lymphocytosis is       5500 K - meets criteria for CLL \par \par CT scans CAP Dec 16, 2020 : negative, no CRISTOFER. No splenomegaly. \par Labs- has mild hypogammaglobulinemia.  [de-identified] : No major change. He is on prednisone now for ? - feels little better on it and he was able to gain some weight. Chronic fatigue contributed to Lyme disease. All other ongoing issues as before. Injured his shoulder recently, now in pain.

## 2021-01-13 NOTE — HISTORY OF PRESENT ILLNESS
[TextBox_4] : This is a following appointment for this 61-year-old male with a history of COPD, CLL.  He is also followed by Dr. Castaneda, hematology oncology.  There is been no change in his coughing or wheezing recently.  He occasionally has some sputum production.  He is not having fever or chills.  He does continue to smoke cigarettes, currently a half a pack per day.  He has smoked for 100 pack years.  He was again counseled on cessation today.  See below.\par \par For his COPD he is maintained on Stiolto Respimat and as needed rescue albuterol inhaler.  He also takes prednisone 5 to 10 mg p.o. daily.\par \par His recent CT scan of chest abdomen and pelvis on 12/16/2020 was unremarkable, there was no lymphadenopathy.

## 2021-02-09 ENCOUNTER — APPOINTMENT (OUTPATIENT)
Dept: ORTHOPEDIC SURGERY | Facility: CLINIC | Age: 61
End: 2021-02-09

## 2021-07-19 ENCOUNTER — OUTPATIENT (OUTPATIENT)
Dept: OUTPATIENT SERVICES | Facility: HOSPITAL | Age: 61
LOS: 1 days | Discharge: HOME | End: 2021-07-19

## 2021-07-19 DIAGNOSIS — D72.820 LYMPHOCYTOSIS (SYMPTOMATIC): ICD-10-CM

## 2021-07-19 DIAGNOSIS — M75.112 INCOMPLETE ROTATOR CUFF TEAR OR RUPTURE OF LEFT SHOULDER, NOT SPECIFIED AS TRAUMATIC: ICD-10-CM

## 2021-07-19 DIAGNOSIS — Z20.822 CONTACT WITH AND (SUSPECTED) EXPOSURE TO COVID-19: ICD-10-CM

## 2021-07-19 DIAGNOSIS — Z86.2 PERSONAL HISTORY OF DISEASES OF THE BLOOD AND BLOOD-FORMING ORGANS AND CERTAIN DISORDERS INVOLVING THE IMMUNE MECHANISM: ICD-10-CM

## 2021-07-19 DIAGNOSIS — Z87.39 PERSONAL HISTORY OF OTHER DISEASES OF THE MUSCULOSKELETAL SYSTEM AND CONNECTIVE TISSUE: ICD-10-CM

## 2021-07-19 DIAGNOSIS — Z87.19 PERSONAL HISTORY OF OTHER DISEASES OF THE DIGESTIVE SYSTEM: ICD-10-CM

## 2021-07-19 DIAGNOSIS — M75.111 INCOMPLETE ROTATOR CUFF TEAR OR RUPTURE OF RIGHT SHOULDER, NOT SPECIFIED AS TRAUMATIC: ICD-10-CM

## 2021-07-20 ENCOUNTER — APPOINTMENT (OUTPATIENT)
Dept: HEMATOLOGY ONCOLOGY | Facility: CLINIC | Age: 61
End: 2021-07-20
Payer: MEDICAID

## 2021-07-20 ENCOUNTER — RESULT REVIEW (OUTPATIENT)
Age: 61
End: 2021-07-20

## 2021-07-20 ENCOUNTER — NON-APPOINTMENT (OUTPATIENT)
Age: 61
End: 2021-07-20

## 2021-07-20 VITALS
TEMPERATURE: 98.2 F | SYSTOLIC BLOOD PRESSURE: 99 MMHG | BODY MASS INDEX: 18.14 KG/M2 | WEIGHT: 102.38 LBS | DIASTOLIC BLOOD PRESSURE: 69 MMHG | HEART RATE: 68 BPM | HEIGHT: 63 IN

## 2021-07-20 DIAGNOSIS — C91.10 CHRONIC LYMPHOCYTIC LEUKEMIA OF B-CELL TYPE NOT HAVING ACHIEVED REMISSION: ICD-10-CM

## 2021-07-20 LAB
BASOPHILS # BLD AUTO: 0.06 K/UL — SIGNIFICANT CHANGE UP (ref 0–0.2)
BASOPHILS NFR BLD AUTO: 0.4 % — SIGNIFICANT CHANGE UP (ref 0–2)
EOSINOPHIL # BLD AUTO: 0.33 K/UL — SIGNIFICANT CHANGE UP (ref 0–0.5)
EOSINOPHIL NFR BLD AUTO: 2.3 % — SIGNIFICANT CHANGE UP (ref 0–6)
HCT VFR BLD CALC: 40.8 % — SIGNIFICANT CHANGE UP (ref 39–50)
HGB BLD-MCNC: 13.9 G/DL — SIGNIFICANT CHANGE UP (ref 13–17)
IMM GRANULOCYTES NFR BLD AUTO: 0.3 % — SIGNIFICANT CHANGE UP (ref 0–1.5)
LYMPHOCYTES # BLD AUTO: 66.9 % — HIGH (ref 13–44)
LYMPHOCYTES # BLD AUTO: 9.71 K/UL — HIGH (ref 1–3.3)
MCHC RBC-ENTMCNC: 34.1 GM/DL — SIGNIFICANT CHANGE UP (ref 32–36)
MCHC RBC-ENTMCNC: 34.5 PG — HIGH (ref 27–34)
MCV RBC AUTO: 101.2 FL — HIGH (ref 80–100)
MONOCYTES # BLD AUTO: 0.52 K/UL — SIGNIFICANT CHANGE UP (ref 0–0.9)
MONOCYTES NFR BLD AUTO: 3.6 % — SIGNIFICANT CHANGE UP (ref 2–14)
NEUTROPHILS # BLD AUTO: 3.86 K/UL — SIGNIFICANT CHANGE UP (ref 1.8–7.4)
NEUTROPHILS NFR BLD AUTO: 26.5 % — LOW (ref 43–77)
NRBC # BLD: 0 /100 WBCS — SIGNIFICANT CHANGE UP (ref 0–0)
PLAT MORPH BLD: NORMAL — SIGNIFICANT CHANGE UP
PLATELET # BLD AUTO: 217 K/UL — SIGNIFICANT CHANGE UP (ref 150–400)
RBC # BLD: 4.03 M/UL — LOW (ref 4.2–5.8)
RBC # FLD: 12.8 % — SIGNIFICANT CHANGE UP (ref 10.3–14.5)
RBC BLD AUTO: SIGNIFICANT CHANGE UP
SMUDGE CELLS # BLD: PRESENT — SIGNIFICANT CHANGE UP
WBC # BLD: 14.46 K/UL — HIGH (ref 3.8–10.5)
WBC # FLD AUTO: 14.46 K/UL — HIGH (ref 3.8–10.5)

## 2021-07-20 PROCEDURE — 99213 OFFICE O/P EST LOW 20 MIN: CPT

## 2021-07-20 RX ORDER — ALBUTEROL SULFATE 90 UG/1
108 (90 BASE) INHALANT RESPIRATORY (INHALATION)
Qty: 1 | Refills: 2 | Status: ACTIVE | COMMUNITY
Start: 2020-05-18 | End: 1900-01-01

## 2021-07-20 NOTE — HISTORY OF PRESENT ILLNESS
[de-identified] : 61 y/o male noted to have elevated lymphocyte count on CBC in June 2020 ( WBC 14.5  with 54 % lymph. In September 2020- WBC 15.9  with lymph 58 % . \par .\par Noted swollen gland R neck few months ago- comes and goes, now not palpable.\par Has multiple medical problems: chronic fatigue, Hx of Lyme disease, weight loss- for many years, stable at 105 in past year but unable to gain weight back, intermittent LLL abd pain, blood in stool, refused colonoscopy recently because of  bad experience with colonoscopy many years ago. has no teeth, bone loss in jaw, no dentures. PTSD. Chronic shoulder pains for > 10 years, rotator tears. On disability.\par Former heavy smoker ( 3-5 packs per day) now cut back to < 1/2 pack per day. Had CT chest Dec 2019- questionable pulm nodule, follow up CT planned for Dec 2020. Hx of Graves disease , s/p CHURCHILL, now on synthroid. \par Chronic sinus infections- told that needs surgery. Syncopal episodes ? etiology. Migraines. \par \par Blood work done in our office 10/21/20 : showed monotypic B cells 38 % ot total with the phenotype of  chronic lymphocytic leukemia/ small lymphocytic lymphoma.  Currently absolute B cell monoclonal lymphocytosis is       5500 K - meets criteria for CLL \par \par CT scans CAP Dec 16, 2020 : negative, no CRISTOFER. No splenomegaly. \par Labs- has mild hypogammaglobulinemia. \par \par Monitored [de-identified] : No major changes. Chronic problems with po intake- no teeth, chronic  fatigue, systemic c/o. \par

## 2021-07-20 NOTE — PHYSICAL EXAM
[Thin] : thin [Normal] : affect appropriate [de-identified] : edentulous  [de-identified] : no HSM  [de-identified] : palpable small cervical LNs R , no other adenopathy

## 2021-07-20 NOTE — ASSESSMENT
[FreeTextEntry1] : 62 y/o male with multiple medical problems.\par \par Diiagnosed with stage 0 CLL in 2020.\par Monitored .\par \par No evidence of progression. \par \par \par Monitor clinically and blood work.\par Return visit in 6 months / sooner PRN. He follows with multiple specialist for his complex medical issues.

## 2021-07-20 NOTE — REVIEW OF SYSTEMS
[Fatigue] : fatigue [Shortness Of Breath] : shortness of breath [Joint Pain] : joint pain [Joint Stiffness] : joint stiffness [Fainting] : fainting [Negative] : Psychiatric [Recent Change In Weight] : ~T no recent weight change [FreeTextEntry2] : weight stable [FreeTextEntry4] : per HPI  [FreeTextEntry7] : per HPI  [de-identified] : not new

## 2021-07-21 DIAGNOSIS — C91.10 CHRONIC LYMPHOCYTIC LEUKEMIA OF B-CELL TYPE NOT HAVING ACHIEVED REMISSION: ICD-10-CM

## 2021-07-21 LAB
ALBUMIN SERPL ELPH-MCNC: 4.2 G/DL — SIGNIFICANT CHANGE UP (ref 3.3–5)
ALP SERPL-CCNC: 71 U/L — SIGNIFICANT CHANGE UP (ref 40–120)
ALT FLD-CCNC: 10 U/L — SIGNIFICANT CHANGE UP (ref 10–45)
ANION GAP SERPL CALC-SCNC: 12 MMOL/L — SIGNIFICANT CHANGE UP (ref 5–17)
AST SERPL-CCNC: 18 U/L — SIGNIFICANT CHANGE UP (ref 10–40)
BILIRUB SERPL-MCNC: 0.2 MG/DL — SIGNIFICANT CHANGE UP (ref 0.2–1.2)
BUN SERPL-MCNC: 13 MG/DL — SIGNIFICANT CHANGE UP (ref 7–23)
CALCIUM SERPL-MCNC: 9.2 MG/DL — SIGNIFICANT CHANGE UP (ref 8.4–10.5)
CHLORIDE SERPL-SCNC: 107 MMOL/L — SIGNIFICANT CHANGE UP (ref 96–108)
CO2 SERPL-SCNC: 23 MMOL/L — SIGNIFICANT CHANGE UP (ref 22–31)
CREAT SERPL-MCNC: 0.97 MG/DL — SIGNIFICANT CHANGE UP (ref 0.5–1.3)
GLUCOSE SERPL-MCNC: 95 MG/DL — SIGNIFICANT CHANGE UP (ref 70–99)
LDH SERPL L TO P-CCNC: 181 U/L — SIGNIFICANT CHANGE UP (ref 50–242)
POTASSIUM SERPL-MCNC: 3.9 MMOL/L — SIGNIFICANT CHANGE UP (ref 3.5–5.3)
POTASSIUM SERPL-SCNC: 3.9 MMOL/L — SIGNIFICANT CHANGE UP (ref 3.5–5.3)
PROT SERPL-MCNC: 5.9 G/DL — LOW (ref 6–8.3)
SODIUM SERPL-SCNC: 142 MMOL/L — SIGNIFICANT CHANGE UP (ref 135–145)

## 2021-07-28 ENCOUNTER — APPOINTMENT (OUTPATIENT)
Dept: INTERNAL MEDICINE | Facility: CLINIC | Age: 61
End: 2021-07-28

## 2021-11-12 ENCOUNTER — APPOINTMENT (OUTPATIENT)
Dept: INTERNAL MEDICINE | Facility: CLINIC | Age: 61
End: 2021-11-12

## 2021-11-16 ENCOUNTER — RX RENEWAL (OUTPATIENT)
Age: 61
End: 2021-11-16

## 2021-11-16 RX ORDER — IPRATROPIUM BROMIDE 42 UG/1
0.06 SPRAY NASAL
Qty: 1 | Refills: 2 | Status: ACTIVE | COMMUNITY
Start: 2021-01-13 | End: 1900-01-01

## 2022-01-21 ENCOUNTER — APPOINTMENT (OUTPATIENT)
Dept: HEMATOLOGY ONCOLOGY | Facility: CLINIC | Age: 62
End: 2022-01-21

## 2022-04-11 ENCOUNTER — RX RENEWAL (OUTPATIENT)
Age: 62
End: 2022-04-11

## 2022-06-20 ENCOUNTER — RX RENEWAL (OUTPATIENT)
Age: 62
End: 2022-06-20

## 2022-06-20 RX ORDER — TIOTROPIUM BROMIDE AND OLODATEROL 3.124; 2.736 UG/1; UG/1
2.5-2.5 SPRAY, METERED RESPIRATORY (INHALATION)
Qty: 1 | Refills: 5 | Status: ACTIVE | COMMUNITY
Start: 2020-05-18 | End: 1900-01-01

## 2022-06-28 ENCOUNTER — INPATIENT (INPATIENT)
Facility: HOSPITAL | Age: 62
LOS: 2 days | Discharge: HOME | DRG: 247 | End: 2022-07-01
Attending: INTERNAL MEDICINE | Admitting: HOSPITALIST
Payer: MEDICAID

## 2022-06-28 VITALS
HEART RATE: 70 BPM | DIASTOLIC BLOOD PRESSURE: 74 MMHG | OXYGEN SATURATION: 95 % | RESPIRATION RATE: 19 BRPM | SYSTOLIC BLOOD PRESSURE: 95 MMHG

## 2022-06-28 DIAGNOSIS — K56.7 ILEUS, UNSPECIFIED: ICD-10-CM

## 2022-06-28 LAB
ALBUMIN SERPL ELPH-MCNC: 4.6 G/DL — SIGNIFICANT CHANGE UP (ref 3.3–5)
ALP SERPL-CCNC: 83 U/L — SIGNIFICANT CHANGE UP (ref 40–120)
ALT FLD-CCNC: 32 U/L — SIGNIFICANT CHANGE UP (ref 12–78)
ANION GAP SERPL CALC-SCNC: 6 MMOL/L — SIGNIFICANT CHANGE UP (ref 5–17)
AST SERPL-CCNC: 35 U/L — SIGNIFICANT CHANGE UP (ref 15–37)
BASOPHILS # BLD AUTO: 0 K/UL — SIGNIFICANT CHANGE UP (ref 0–0.2)
BASOPHILS NFR BLD AUTO: 0 % — SIGNIFICANT CHANGE UP (ref 0–2)
BILIRUB SERPL-MCNC: 0.4 MG/DL — SIGNIFICANT CHANGE UP (ref 0.2–1.2)
BUN SERPL-MCNC: 13 MG/DL — SIGNIFICANT CHANGE UP (ref 7–23)
CALCIUM SERPL-MCNC: 10.3 MG/DL — HIGH (ref 8.5–10.1)
CHLORIDE SERPL-SCNC: 106 MMOL/L — SIGNIFICANT CHANGE UP (ref 96–108)
CO2 SERPL-SCNC: 27 MMOL/L — SIGNIFICANT CHANGE UP (ref 22–31)
CREAT SERPL-MCNC: 1.47 MG/DL — HIGH (ref 0.5–1.3)
EGFR: 54 ML/MIN/1.73M2 — LOW
EOSINOPHIL # BLD AUTO: 0 K/UL — SIGNIFICANT CHANGE UP (ref 0–0.5)
EOSINOPHIL NFR BLD AUTO: 0 % — SIGNIFICANT CHANGE UP (ref 0–6)
GLUCOSE SERPL-MCNC: 96 MG/DL — SIGNIFICANT CHANGE UP (ref 70–99)
HCT VFR BLD CALC: 56.1 % — HIGH (ref 39–50)
HGB BLD-MCNC: 19 G/DL — CRITICAL HIGH (ref 13–17)
LACTATE SERPL-SCNC: 1.4 MMOL/L — SIGNIFICANT CHANGE UP (ref 0.7–2)
LIDOCAIN IGE QN: 130 U/L — SIGNIFICANT CHANGE UP (ref 73–393)
LYMPHOCYTES # BLD AUTO: 10.66 K/UL — HIGH (ref 1–3.3)
LYMPHOCYTES # BLD AUTO: 64 % — HIGH (ref 13–44)
MCHC RBC-ENTMCNC: 33.9 GM/DL — SIGNIFICANT CHANGE UP (ref 32–36)
MCHC RBC-ENTMCNC: 34.5 PG — HIGH (ref 27–34)
MCV RBC AUTO: 102 FL — HIGH (ref 80–100)
MONOCYTES # BLD AUTO: 0.33 K/UL — SIGNIFICANT CHANGE UP (ref 0–0.9)
MONOCYTES NFR BLD AUTO: 2 % — SIGNIFICANT CHANGE UP (ref 2–14)
NEUTROPHILS # BLD AUTO: 5 K/UL — SIGNIFICANT CHANGE UP (ref 1.8–7.4)
NEUTROPHILS NFR BLD AUTO: 30 % — LOW (ref 43–77)
NRBC # BLD: SIGNIFICANT CHANGE UP /100 WBCS (ref 0–0)
PLATELET # BLD AUTO: 312 K/UL — SIGNIFICANT CHANGE UP (ref 150–400)
POTASSIUM SERPL-MCNC: 3.6 MMOL/L — SIGNIFICANT CHANGE UP (ref 3.5–5.3)
POTASSIUM SERPL-SCNC: 3.6 MMOL/L — SIGNIFICANT CHANGE UP (ref 3.5–5.3)
PROT SERPL-MCNC: 8.1 GM/DL — SIGNIFICANT CHANGE UP (ref 6–8.3)
RBC # BLD: 5.5 M/UL — SIGNIFICANT CHANGE UP (ref 4.2–5.8)
RBC # FLD: 13 % — SIGNIFICANT CHANGE UP (ref 10.3–14.5)
SODIUM SERPL-SCNC: 139 MMOL/L — SIGNIFICANT CHANGE UP (ref 135–145)
TROPONIN I, HIGH SENSITIVITY RESULT: <3 NG/L — SIGNIFICANT CHANGE UP
WBC # BLD: 16.65 K/UL — HIGH (ref 3.8–10.5)
WBC # FLD AUTO: 16.65 K/UL — HIGH (ref 3.8–10.5)

## 2022-06-28 PROCEDURE — 80053 COMPREHEN METABOLIC PANEL: CPT

## 2022-06-28 PROCEDURE — 81003 URINALYSIS AUTO W/O SCOPE: CPT

## 2022-06-28 PROCEDURE — 80307 DRUG TEST PRSMV CHEM ANLYZR: CPT

## 2022-06-28 PROCEDURE — 84443 ASSAY THYROID STIM HORMONE: CPT

## 2022-06-28 PROCEDURE — 85027 COMPLETE CBC AUTOMATED: CPT

## 2022-06-28 PROCEDURE — 85025 COMPLETE CBC W/AUTO DIFF WBC: CPT

## 2022-06-28 PROCEDURE — G1004: CPT

## 2022-06-28 PROCEDURE — 93005 ELECTROCARDIOGRAM TRACING: CPT

## 2022-06-28 PROCEDURE — 74019 RADEX ABDOMEN 2 VIEWS: CPT

## 2022-06-28 PROCEDURE — 84481 FREE ASSAY (FT-3): CPT

## 2022-06-28 PROCEDURE — 74177 CT ABD & PELVIS W/CONTRAST: CPT | Mod: 26,ME

## 2022-06-28 PROCEDURE — 83735 ASSAY OF MAGNESIUM: CPT

## 2022-06-28 PROCEDURE — 71045 X-RAY EXAM CHEST 1 VIEW: CPT

## 2022-06-28 PROCEDURE — 80048 BASIC METABOLIC PNL TOTAL CA: CPT

## 2022-06-28 PROCEDURE — 80076 HEPATIC FUNCTION PANEL: CPT

## 2022-06-28 PROCEDURE — 88305 TISSUE EXAM BY PATHOLOGIST: CPT

## 2022-06-28 PROCEDURE — 83880 ASSAY OF NATRIURETIC PEPTIDE: CPT

## 2022-06-28 PROCEDURE — 76705 ECHO EXAM OF ABDOMEN: CPT

## 2022-06-28 PROCEDURE — 88312 SPECIAL STAINS GROUP 1: CPT

## 2022-06-28 PROCEDURE — C9113: CPT

## 2022-06-28 PROCEDURE — 84100 ASSAY OF PHOSPHORUS: CPT

## 2022-06-28 PROCEDURE — 83036 HEMOGLOBIN GLYCOSYLATED A1C: CPT

## 2022-06-28 PROCEDURE — 82746 ASSAY OF FOLIC ACID SERUM: CPT

## 2022-06-28 PROCEDURE — 94640 AIRWAY INHALATION TREATMENT: CPT

## 2022-06-28 PROCEDURE — 99285 EMERGENCY DEPT VISIT HI MDM: CPT

## 2022-06-28 PROCEDURE — 36415 COLL VENOUS BLD VENIPUNCTURE: CPT

## 2022-06-28 PROCEDURE — 84439 ASSAY OF FREE THYROXINE: CPT

## 2022-06-28 PROCEDURE — 82607 VITAMIN B-12: CPT

## 2022-06-28 PROCEDURE — 82668 ASSAY OF ERYTHROPOIETIN: CPT

## 2022-06-28 RX ORDER — MORPHINE SULFATE 50 MG/1
4 CAPSULE, EXTENDED RELEASE ORAL ONCE
Refills: 0 | Status: DISCONTINUED | OUTPATIENT
Start: 2022-06-28 | End: 2022-06-28

## 2022-06-28 RX ORDER — SODIUM CHLORIDE 9 MG/ML
1000 INJECTION INTRAMUSCULAR; INTRAVENOUS; SUBCUTANEOUS ONCE
Refills: 0 | Status: COMPLETED | OUTPATIENT
Start: 2022-06-28 | End: 2022-06-28

## 2022-06-28 RX ORDER — ONDANSETRON 8 MG/1
4 TABLET, FILM COATED ORAL ONCE
Refills: 0 | Status: COMPLETED | OUTPATIENT
Start: 2022-06-28 | End: 2022-06-28

## 2022-06-28 RX ADMIN — SODIUM CHLORIDE 1000 MILLILITER(S): 9 INJECTION INTRAMUSCULAR; INTRAVENOUS; SUBCUTANEOUS at 19:12

## 2022-06-28 RX ADMIN — SODIUM CHLORIDE 1000 MILLILITER(S): 9 INJECTION INTRAMUSCULAR; INTRAVENOUS; SUBCUTANEOUS at 20:46

## 2022-06-28 RX ADMIN — MORPHINE SULFATE 4 MILLIGRAM(S): 50 CAPSULE, EXTENDED RELEASE ORAL at 23:29

## 2022-06-28 RX ADMIN — ONDANSETRON 4 MILLIGRAM(S): 8 TABLET, FILM COATED ORAL at 19:04

## 2022-06-28 RX ADMIN — MORPHINE SULFATE 4 MILLIGRAM(S): 50 CAPSULE, EXTENDED RELEASE ORAL at 23:16

## 2022-06-28 RX ADMIN — SODIUM CHLORIDE 1000 MILLILITER(S): 9 INJECTION INTRAMUSCULAR; INTRAVENOUS; SUBCUTANEOUS at 23:08

## 2022-06-28 RX ADMIN — SODIUM CHLORIDE 1000 MILLILITER(S): 9 INJECTION INTRAMUSCULAR; INTRAVENOUS; SUBCUTANEOUS at 23:09

## 2022-06-28 RX ADMIN — MORPHINE SULFATE 4 MILLIGRAM(S): 50 CAPSULE, EXTENDED RELEASE ORAL at 19:05

## 2022-06-28 NOTE — ED PROVIDER NOTE - NSICDXPASTMEDICALHX_GEN_ALL_CORE_FT
PAST MEDICAL HISTORY:  Chronic obstructive pulmonary disease (COPD)     CLL (chronic lymphocytic leukemia)

## 2022-06-28 NOTE — ED ADULT TRIAGE NOTE - CHIEF COMPLAINT QUOTE
Pt presents to ED from home for abdominal pain. as per EMS pt's brother saw pt curl up on floor in severe pain after bowel movement and called EMS. denies syncope or fall. pt is poor historian, groaning in pain. states name, birthday, moves all extremities. bgm 116 in triage.

## 2022-06-28 NOTE — ED PROVIDER NOTE - PROGRESS NOTE DETAILS
Triston Clark MD  admitted to [facility] for ileus, surgery resident to see, recommending NG at this time.

## 2022-06-28 NOTE — ED ADULT NURSE NOTE - NSIMPLEMENTINTERV_GEN_ALL_ED
Implemented All Universal Safety Interventions:  [place] to call system. Call bell, personal items and telephone within reach. Instruct patient to call for assistance. Room bathroom lighting operational. Non-slip footwear when patient is off stretcher. Physically safe environment: no spills, clutter or unnecessary equipment. Stretcher in lowest position, wheels locked, appropriate side rails in place.

## 2022-06-28 NOTE — ED ADULT NURSE NOTE - IS THE PATIENT ABLE TO BE SCREENED?
----- Message from Sonia Small sent at 12/4/2017  8:16 AM CST -----  Contact: [phone]   Pt would like a same day appt in the [facility] area, pt complaints of having severe cough and still not feeling well.  Please call back at [phone]. Thx-   Yes

## 2022-06-28 NOTE — ED PROVIDER NOTE - PHYSICAL EXAMINATION
GEN - moderate distress, chronically ill appearing, thin, temporal wasting; A+O x3   HEAD - NC/AT     EYES - EOMI, no conjunctival pallor, no scleral icterus  ENT -   mucous membranes  moist , no discharge      NECK - Neck supple  PULM - CTA b/l,  symmetric breath sounds  COR -  RRR, S1 S2, no murmurs  ABD - , ND, epigastric ttp, soft, voluntary guarding, no rebound, no masses    BACK - no CVA tenderness, nontender spine     EXTREMS - no edema, no deformity, warm and well perfused    SKIN - no rash or bruising      NEUROLOGIC - alert, sensation nl, motor 5/5 RUE/LUE/RLE/LLE

## 2022-06-28 NOTE — PHARMACOTHERAPY INTERVENTION NOTE - COMMENTS
Medication reconciliation completed.  Reviewed Medication list and confirmed med allergies with patient; confirmed with Dr. Hudson [facility].  Pt confirms that he is not consistent with taking his medication due to trouble getting appointments with his doctors, states he hasn't taken his thyroid medication in about a month and other medications for over a year.

## 2022-06-28 NOTE — ED PROVIDER NOTE - OBJECTIVE STATEMENT
61 yo male w/PMHx of CLL not on medication, COPD, lyme disease presents to the ED c/o sever abd pain. Pt states that the pain started this morning. Pt has nausea, vomiting. Pt had pain like this before but was unsure of what caused it. Smoker. Pt does not drink or use drugs. Denies CP.

## 2022-06-28 NOTE — ED ADULT NURSE NOTE - OBJECTIVE STATEMENT
Pt alert and oriented x 3.  Pt c/o abdominal pain and diarrhea that started a few hours ago, pt presents with chills. Pt denies fever, headache, chest pain.

## 2022-06-29 DIAGNOSIS — Z98.890 OTHER SPECIFIED POSTPROCEDURAL STATES: Chronic | ICD-10-CM

## 2022-06-29 LAB
ALBUMIN SERPL ELPH-MCNC: 3.3 G/DL — SIGNIFICANT CHANGE UP (ref 3.3–5)
ALP SERPL-CCNC: 56 U/L — SIGNIFICANT CHANGE UP (ref 40–120)
ALT FLD-CCNC: 23 U/L — SIGNIFICANT CHANGE UP (ref 12–78)
ANION GAP SERPL CALC-SCNC: 5 MMOL/L — SIGNIFICANT CHANGE UP (ref 5–17)
APPEARANCE UR: CLEAR — SIGNIFICANT CHANGE UP
AST SERPL-CCNC: 21 U/L — SIGNIFICANT CHANGE UP (ref 15–37)
BASOPHILS # BLD AUTO: 0.02 K/UL — SIGNIFICANT CHANGE UP (ref 0–0.2)
BASOPHILS NFR BLD AUTO: 0.2 % — SIGNIFICANT CHANGE UP (ref 0–2)
BILIRUB SERPL-MCNC: 0.2 MG/DL — SIGNIFICANT CHANGE UP (ref 0.2–1.2)
BILIRUB UR-MCNC: NEGATIVE — SIGNIFICANT CHANGE UP
BUN SERPL-MCNC: 13 MG/DL — SIGNIFICANT CHANGE UP (ref 7–23)
CALCIUM SERPL-MCNC: 8.6 MG/DL — SIGNIFICANT CHANGE UP (ref 8.5–10.1)
CHLORIDE SERPL-SCNC: 112 MMOL/L — HIGH (ref 96–108)
CO2 SERPL-SCNC: 22 MMOL/L — SIGNIFICANT CHANGE UP (ref 22–31)
COLOR SPEC: YELLOW — SIGNIFICANT CHANGE UP
CREAT SERPL-MCNC: 1.05 MG/DL — SIGNIFICANT CHANGE UP (ref 0.5–1.3)
DIFF PNL FLD: NEGATIVE — SIGNIFICANT CHANGE UP
EGFR: 81 ML/MIN/1.73M2 — SIGNIFICANT CHANGE UP
EOSINOPHIL # BLD AUTO: 0.02 K/UL — SIGNIFICANT CHANGE UP (ref 0–0.5)
EOSINOPHIL NFR BLD AUTO: 0.2 % — SIGNIFICANT CHANGE UP (ref 0–6)
FOLATE SERPL-MCNC: 6.6 NG/ML — SIGNIFICANT CHANGE UP
GLUCOSE SERPL-MCNC: 129 MG/DL — HIGH (ref 70–99)
GLUCOSE UR QL: NEGATIVE — SIGNIFICANT CHANGE UP
HCT VFR BLD CALC: 40.4 % — SIGNIFICANT CHANGE UP (ref 39–50)
HGB BLD-MCNC: 13.7 G/DL — SIGNIFICANT CHANGE UP (ref 13–17)
IMM GRANULOCYTES NFR BLD AUTO: 0.3 % — SIGNIFICANT CHANGE UP (ref 0–1.5)
KETONES UR-MCNC: NEGATIVE — SIGNIFICANT CHANGE UP
LEUKOCYTE ESTERASE UR-ACNC: NEGATIVE — SIGNIFICANT CHANGE UP
LYMPHOCYTES # BLD AUTO: 46.3 % — HIGH (ref 13–44)
LYMPHOCYTES # BLD AUTO: 6.14 K/UL — HIGH (ref 1–3.3)
MCHC RBC-ENTMCNC: 33.9 GM/DL — SIGNIFICANT CHANGE UP (ref 32–36)
MCHC RBC-ENTMCNC: 34.8 PG — HIGH (ref 27–34)
MCV RBC AUTO: 102.5 FL — HIGH (ref 80–100)
MONOCYTES # BLD AUTO: 0.08 K/UL — SIGNIFICANT CHANGE UP (ref 0–0.9)
MONOCYTES NFR BLD AUTO: 0.6 % — LOW (ref 2–14)
NEUTROPHILS # BLD AUTO: 6.97 K/UL — SIGNIFICANT CHANGE UP (ref 1.8–7.4)
NEUTROPHILS NFR BLD AUTO: 52.4 % — SIGNIFICANT CHANGE UP (ref 43–77)
NITRITE UR-MCNC: NEGATIVE — SIGNIFICANT CHANGE UP
PCP SPEC-MCNC: SIGNIFICANT CHANGE UP
PH UR: 6 — SIGNIFICANT CHANGE UP (ref 5–8)
PLATELET # BLD AUTO: 208 K/UL — SIGNIFICANT CHANGE UP (ref 150–400)
POTASSIUM SERPL-MCNC: 4.7 MMOL/L — SIGNIFICANT CHANGE UP (ref 3.5–5.3)
POTASSIUM SERPL-SCNC: 4.7 MMOL/L — SIGNIFICANT CHANGE UP (ref 3.5–5.3)
PROT SERPL-MCNC: 5.5 GM/DL — LOW (ref 6–8.3)
PROT UR-MCNC: NEGATIVE — SIGNIFICANT CHANGE UP
RBC # BLD: 3.94 M/UL — LOW (ref 4.2–5.8)
RBC # FLD: 13.2 % — SIGNIFICANT CHANGE UP (ref 10.3–14.5)
SODIUM SERPL-SCNC: 139 MMOL/L — SIGNIFICANT CHANGE UP (ref 135–145)
SP GR SPEC: 1.01 — SIGNIFICANT CHANGE UP (ref 1.01–1.02)
T3FREE SERPL-MCNC: 1.09 PG/ML — LOW (ref 1.8–4.6)
T4 FREE SERPL-MCNC: 0.3 NG/DL — LOW (ref 0.76–1.46)
TSH SERPL-MCNC: 59.7 UU/ML — HIGH (ref 0.34–4.82)
UROBILINOGEN FLD QL: NEGATIVE — SIGNIFICANT CHANGE UP
VIT B12 SERPL-MCNC: 470 PG/ML — SIGNIFICANT CHANGE UP (ref 232–1245)
WBC # BLD: 13.27 K/UL — HIGH (ref 3.8–10.5)
WBC # FLD AUTO: 13.27 K/UL — HIGH (ref 3.8–10.5)

## 2022-06-29 PROCEDURE — 99223 1ST HOSP IP/OBS HIGH 75: CPT

## 2022-06-29 PROCEDURE — 74019 RADEX ABDOMEN 2 VIEWS: CPT | Mod: 26

## 2022-06-29 PROCEDURE — 71045 X-RAY EXAM CHEST 1 VIEW: CPT | Mod: 26

## 2022-06-29 PROCEDURE — 93010 ELECTROCARDIOGRAM REPORT: CPT

## 2022-06-29 PROCEDURE — 99406 BEHAV CHNG SMOKING 3-10 MIN: CPT | Mod: 25

## 2022-06-29 PROCEDURE — 99497 ADVNCD CARE PLAN 30 MIN: CPT | Mod: 25

## 2022-06-29 PROCEDURE — 12345: CPT | Mod: NC

## 2022-06-29 RX ORDER — LEVOTHYROXINE SODIUM 125 MCG
137 TABLET ORAL DAILY
Refills: 0 | Status: DISCONTINUED | OUTPATIENT
Start: 2022-06-29 | End: 2022-07-01

## 2022-06-29 RX ORDER — DICLOFENAC SODIUM 30 MG/G
1 GEL TOPICAL
Qty: 0 | Refills: 0 | DISCHARGE

## 2022-06-29 RX ORDER — PANTOPRAZOLE SODIUM 20 MG/1
40 TABLET, DELAYED RELEASE ORAL
Refills: 0 | Status: DISCONTINUED | OUTPATIENT
Start: 2022-06-29 | End: 2022-06-29

## 2022-06-29 RX ORDER — DULOXETINE HYDROCHLORIDE 30 MG/1
60 CAPSULE, DELAYED RELEASE ORAL DAILY
Refills: 0 | Status: DISCONTINUED | OUTPATIENT
Start: 2022-06-29 | End: 2022-07-01

## 2022-06-29 RX ORDER — LEVOTHYROXINE SODIUM 125 MCG
125 TABLET ORAL DAILY
Refills: 0 | Status: DISCONTINUED | OUTPATIENT
Start: 2022-06-29 | End: 2022-06-29

## 2022-06-29 RX ORDER — ENOXAPARIN SODIUM 100 MG/ML
40 INJECTION SUBCUTANEOUS EVERY 24 HOURS
Refills: 0 | Status: DISCONTINUED | OUTPATIENT
Start: 2022-06-29 | End: 2022-06-29

## 2022-06-29 RX ORDER — BUDESONIDE AND FORMOTEROL FUMARATE DIHYDRATE 160; 4.5 UG/1; UG/1
2 AEROSOL RESPIRATORY (INHALATION)
Refills: 0 | Status: DISCONTINUED | OUTPATIENT
Start: 2022-06-29 | End: 2022-07-01

## 2022-06-29 RX ORDER — SODIUM CHLORIDE 9 MG/ML
1000 INJECTION INTRAMUSCULAR; INTRAVENOUS; SUBCUTANEOUS
Refills: 0 | Status: DISCONTINUED | OUTPATIENT
Start: 2022-06-29 | End: 2022-06-29

## 2022-06-29 RX ORDER — TIOTROPIUM BROMIDE 18 UG/1
1 CAPSULE ORAL; RESPIRATORY (INHALATION) DAILY
Refills: 0 | Status: DISCONTINUED | OUTPATIENT
Start: 2022-06-29 | End: 2022-07-01

## 2022-06-29 RX ORDER — NICOTINE POLACRILEX 2 MG
1 GUM BUCCAL
Qty: 0 | Refills: 0 | DISCHARGE

## 2022-06-29 RX ORDER — POLYETHYLENE GLYCOL 3350 17 G/17G
17 POWDER, FOR SOLUTION ORAL DAILY
Refills: 0 | Status: DISCONTINUED | OUTPATIENT
Start: 2022-06-29 | End: 2022-06-30

## 2022-06-29 RX ORDER — MORPHINE SULFATE 50 MG/1
4 CAPSULE, EXTENDED RELEASE ORAL EVERY 4 HOURS
Refills: 0 | Status: DISCONTINUED | OUTPATIENT
Start: 2022-06-29 | End: 2022-06-30

## 2022-06-29 RX ORDER — SENNA PLUS 8.6 MG/1
2 TABLET ORAL AT BEDTIME
Refills: 0 | Status: DISCONTINUED | OUTPATIENT
Start: 2022-06-29 | End: 2022-06-30

## 2022-06-29 RX ORDER — ACETAMINOPHEN 500 MG
650 TABLET ORAL EVERY 6 HOURS
Refills: 0 | Status: DISCONTINUED | OUTPATIENT
Start: 2022-06-29 | End: 2022-07-01

## 2022-06-29 RX ORDER — SODIUM CHLORIDE 9 MG/ML
1000 INJECTION INTRAMUSCULAR; INTRAVENOUS; SUBCUTANEOUS
Refills: 0 | Status: DISCONTINUED | OUTPATIENT
Start: 2022-06-29 | End: 2022-06-30

## 2022-06-29 RX ORDER — INFLUENZA VIRUS VACCINE 15; 15; 15; 15 UG/.5ML; UG/.5ML; UG/.5ML; UG/.5ML
0.5 SUSPENSION INTRAMUSCULAR ONCE
Refills: 0 | Status: DISCONTINUED | OUTPATIENT
Start: 2022-06-29 | End: 2022-07-01

## 2022-06-29 RX ORDER — NICOTINE POLACRILEX 2 MG
1 GUM BUCCAL DAILY
Refills: 0 | Status: DISCONTINUED | OUTPATIENT
Start: 2022-06-29 | End: 2022-07-01

## 2022-06-29 RX ORDER — ALBUTEROL 90 UG/1
2 AEROSOL, METERED ORAL EVERY 6 HOURS
Refills: 0 | Status: DISCONTINUED | OUTPATIENT
Start: 2022-06-29 | End: 2022-07-01

## 2022-06-29 RX ORDER — PANTOPRAZOLE SODIUM 20 MG/1
40 TABLET, DELAYED RELEASE ORAL
Refills: 0 | Status: DISCONTINUED | OUTPATIENT
Start: 2022-06-29 | End: 2022-07-01

## 2022-06-29 RX ORDER — ONDANSETRON 8 MG/1
4 TABLET, FILM COATED ORAL EVERY 8 HOURS
Refills: 0 | Status: DISCONTINUED | OUTPATIENT
Start: 2022-06-29 | End: 2022-07-01

## 2022-06-29 RX ORDER — LANOLIN ALCOHOL/MO/W.PET/CERES
3 CREAM (GRAM) TOPICAL AT BEDTIME
Refills: 0 | Status: DISCONTINUED | OUTPATIENT
Start: 2022-06-29 | End: 2022-07-01

## 2022-06-29 RX ORDER — HEPARIN SODIUM 5000 [USP'U]/ML
5000 INJECTION INTRAVENOUS; SUBCUTANEOUS EVERY 12 HOURS
Refills: 0 | Status: DISCONTINUED | OUTPATIENT
Start: 2022-06-29 | End: 2022-06-29

## 2022-06-29 RX ORDER — LEVOTHYROXINE SODIUM 125 MCG
1 TABLET ORAL
Qty: 0 | Refills: 0 | DISCHARGE

## 2022-06-29 RX ADMIN — Medication 125 MICROGRAM(S): at 05:56

## 2022-06-29 RX ADMIN — DULOXETINE HYDROCHLORIDE 60 MILLIGRAM(S): 30 CAPSULE, DELAYED RELEASE ORAL at 11:01

## 2022-06-29 RX ADMIN — Medication 1 PATCH: at 11:02

## 2022-06-29 RX ADMIN — PANTOPRAZOLE SODIUM 40 MILLIGRAM(S): 20 TABLET, DELAYED RELEASE ORAL at 11:03

## 2022-06-29 RX ADMIN — TIOTROPIUM BROMIDE 1 CAPSULE(S): 18 CAPSULE ORAL; RESPIRATORY (INHALATION) at 08:42

## 2022-06-29 RX ADMIN — Medication 125 MILLIGRAM(S): at 01:29

## 2022-06-29 RX ADMIN — PANTOPRAZOLE SODIUM 40 MILLIGRAM(S): 20 TABLET, DELAYED RELEASE ORAL at 17:14

## 2022-06-29 RX ADMIN — Medication 1 PATCH: at 20:21

## 2022-06-29 RX ADMIN — SODIUM CHLORIDE 100 MILLILITER(S): 9 INJECTION INTRAMUSCULAR; INTRAVENOUS; SUBCUTANEOUS at 02:23

## 2022-06-29 RX ADMIN — ALBUTEROL 2 PUFF(S): 90 AEROSOL, METERED ORAL at 02:05

## 2022-06-29 RX ADMIN — SODIUM CHLORIDE 100 MILLILITER(S): 9 INJECTION INTRAMUSCULAR; INTRAVENOUS; SUBCUTANEOUS at 12:12

## 2022-06-29 RX ADMIN — SENNA PLUS 2 TABLET(S): 8.6 TABLET ORAL at 21:02

## 2022-06-29 RX ADMIN — SODIUM CHLORIDE 50 MILLILITER(S): 9 INJECTION INTRAMUSCULAR; INTRAVENOUS; SUBCUTANEOUS at 12:22

## 2022-06-29 RX ADMIN — Medication 3 MILLIGRAM(S): at 21:02
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
## 2022-06-29 NOTE — H&P ADULT - NSICDXFAMILYHX_GEN_ALL_CORE_FT
FAMILY HISTORY:  Father  Still living? Unknown  FH: Alzheimers disease, Age at diagnosis: Age Unknown    Mother  Still living? Unknown  FH: brain cancer, Age at diagnosis: Age Unknown  FH: lung cancer, Age at diagnosis: Age Unknown

## 2022-06-29 NOTE — CONSULT NOTE ADULT - ASSESSMENT
62M with extensive PMH presents with ileus    recommend NPO  IVF  NGT if patient vomits  correct lytes prn  GI evaluation  medical management as per primary team  no surgical intervention at this time, thank you for the consult    Plan discussed with Dr. Lentz

## 2022-06-29 NOTE — H&P ADULT - NSHPOUTPATIENTPROVIDERS_GEN_ALL_CORE
Pt's insurance recently changed and he is looking to find new doctors   Pulmonology - Dr. Palacio   Cardiologist - Dr. Azar Cheung/Onc - Dr. Castaneda
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
## 2022-06-29 NOTE — PROVIDER CONTACT NOTE (OTHER) - SITUATION
CLL and polycythemia    left message with ans service for dr to see pt in the morning
COPD    left message with ans service for dr to see pt in the morning
abdominal pain - ileus, gastric distension, constipation    left message with ans service for dr to see pt in the morning
BP: 87/69 (76, HR: 53, Pt has not urinated and bladder scan reveals 387. Pt refusing straight cath.
Pt was given morphine 4 mg IV push prior to NG tube placement, Pt unable to tolerate getting a NG tube put in and patient pulled out the NG tube.

## 2022-06-29 NOTE — PATIENT PROFILE ADULT - FUNCTIONAL ASSESSMENT - DAILY ACTIVITY 6.
-- DO NOT REPLY / DO NOT REPLY ALL --  -- Message is from the [facility]--    COVID-19 Universal Screening: Negative    General Patient Message      Reason for Call: Requesting call back to get clarification on MRI order.    Caller Information       Type Contact Phone    06/18/2020 07:42 AM Phone (Incoming) Marsha [phone]     [facility]          Alternative phone number: n/a    Turnaround time given to caller:   \"This message will be sent to [state Provider's name]. The clinical team will fulfill your request as soon as they review your message.\"    
Spoke with Marsha advised PET scan shows lesions on right femur per Dr. Curtis's last progress note.  
4 = No assist / stand by assistance

## 2022-06-29 NOTE — H&P ADULT - ASSESSMENT
63 y/o M presents with abdominal pain     1. Abdominal pain likely secondary to ileus, gastric distension, constipation   - Admit to med/surg   - NPO; advance diet as tolerated   - s/p 2L of NS, c/w NS at 100 ml/hr   - Pt had NGT placed in the ER which he pulled out; no evidence of bowel obstruction on CT would keep NGT out -> spoke to surgery resident who recommended placing NGT in if the pt has any episodes of vomiting   - AM abd XR   - Pain control: Tylenol, NSAIDs   - Avoid opiates   - Zofran for nausea, pepcid PRN for acid reflux   - Bowel regimen - Miralax, Senna, Suppository PRN  - Pt may need colonoscopy; however, he states there have been concerns giving him general anesthesia because of his COPD    - Surgery consult - Dr. Herrera   - GI consult - Dr. Ng     2. COPD exacerbation   - Pt with significant wheezing on exam   - Has not been taking any home medications   - Will start Symbicort and Spiriva  - Given 125 mg of Solumedrol in the ER, c/w 40 mg Q6H   - Pulmonology consult - Dr. Palacio     3. Leukocytosis with lymphocyte predominance secondary to CLL   - WBC 16.65, lymphocyte 64%, trend   - Concerns for failure to f/u outpt, will consult Heme/Onc - Dr. Castaneda     4. Polycythemia  - Hb 19, HCT 56, , trend   - Ordered B12, folate, EPO level, JAK2      5. Acute kidney injury   - Cr 1.47 (baseline ~0.97), monitor closely   - s/p 2L of NS, c/w NS at 100 ml/hr (monitor for fluid overload)   - Avoid nephrotoxic medications   - Strict I+Os, daily weights     6. History of CLL, hypothyroidism, COPD, lyme disease, hiatal hernia, herniated discs   - c/w home medications; verified with pt at the bedside   - Sotp trazodone as pt states that he sleeps for a prolonged period of time after taking the medication   - f/u TFTs     DVT ppx: Heparin 5,000 units Q12H (hold for PLT <50,000)   Code status: DNR/DNI (Had an extensive discussion with the pt at the bedside regarding goals of care. He stated that if his heart were to stop or he was to stop breathing at this moment he would not want to be resuscitated. Completed MOLST form with the pt at the bedside. The patient is A+Ox3 at the time of my evaluation and has full capacity. Able to make an informed decision and understands risks associated with decisions made. MOLST completed and placed into chart).     *Emergency contact Gume Garcia [phone]  63 y/o M presents with abdominal pain     1. Abdominal pain likely secondary to ileus, gastric distension, constipation   - Admit to med/surg   - NPO; advance diet as tolerated   - s/p 2L of NS, c/w NS at 100 ml/hr   - Pt had NGT placed in the ER which he pulled out; no evidence of bowel obstruction on CT would keep NGT out -> spoke to surgery resident who recommended placing NGT in if the pt has any episodes of vomiting   - AM abd XR   - Pain control: Tylenol, NSAIDs   - Avoid opiates   - Zofran for nausea, pepcid PRN for acid reflux   - Bowel regimen - Miralax, Senna, Suppository PRN  - Pt may need colonoscopy; however, he states there have been concerns giving him general anesthesia because of his COPD    - Surgery consult - Dr. Herrera   - GI consult - Dr. Ng     2. COPD exacerbation   - Pt with significant wheezing on exam   - Has not been taking any home medications   - Will start Symbicort and Spiriva  - Given 125 mg of Solumedrol in the ER, c/w 40 mg Q6H   - Pulmonology consult - Dr. Palacio     3. Leukocytosis with lymphocyte predominance secondary to CLL   - WBC 16.65, lymphocyte 64%, trend   - Concerns for failure to f/u outpt, will consult Heme/Onc - Dr. Castaneda     4. Polycythemia  - Hb 19, HCT 56, , trend   - Ordered B12, folate, EPO level, JAK2      5. Acute kidney injury   - Cr 1.47 (baseline ~0.97), monitor closely   - s/p 2L of NS, c/w NS at 100 ml/hr (monitor for fluid overload)   - Avoid nephrotoxic medications   - Strict I+Os, daily weights     6. History of CLL, hypothyroidism, COPD, lyme disease, hiatal hernia, herniated discs   - c/w home medications; verified with pt at the bedside   - Stop trazodone as pt states that he sleeps for a prolonged period of time after taking the medication   - f/u TFTs   - Case management/social work consults     DVT ppx: Heparin 5,000 units Q12H (hold for PLT <50,000)   Code status: DNR/DNI (Had an extensive discussion with the pt at the bedside regarding goals of care. He stated that if his heart were to stop or he was to stop breathing at this moment he would not want to be resuscitated. Completed MOLST form with the pt at the bedside. The patient is A+Ox3 at the time of my evaluation and has full capacity. Able to make an informed decision and understands risks associated with decisions made. MOLST completed and placed into chart).     *Emergency contact Gume Garcia [phone]  63 y/o M presents with abdominal pain     1. Abdominal pain likely secondary to ileus, gastric distension, constipation   - Admit to med/surg   - NPO; advance diet as tolerated   - s/p 2L of NS, c/w NS at 100 ml/hr   - Pt had NGT placed in the ER which he pulled out; no evidence of bowel obstruction on CT would keep NGT out -> spoke to surgery resident who recommended placing NGT in if the pt has any episodes of vomiting   - AM abd XR   - Pain control: Tylenol, NSAIDs   - Avoid opiates   - Zofran for nausea, pepcid PRN for acid reflux   - Bowel regimen - Miralax, Senna, Suppository PRN  - Pt may need colonoscopy; however, he states there have been concerns giving him general anesthesia because of his COPD    - Surgery consult - Dr. Herrera   - GI consult - Dr. Ng     2. COPD exacerbation   - Pt with significant wheezing on exam   - Has not been taking any home medications   - Will start Symbicort and Spiriva  - Given 125 mg of Solumedrol in the ER, c/w 40 mg Q6H   - Pulmonology consult - Dr. Palacio     3. Leukocytosis with lymphocyte predominance secondary to CLL   - WBC 16.65, lymphocyte 64%, trend   - Concerns for failure to f/u outpt, will consult Heme/Onc - Dr. Castaneda     4. Polycythemia  - Hb 19, HCT 56, , trend   - Ordered B12, folate, EPO level, JAK2      5. Acute kidney injury   - Cr 1.47 (baseline ~0.97), monitor closely   - s/p 2L of NS, c/w NS at 100 ml/hr (monitor for fluid overload)   - Avoid nephrotoxic medications   - Strict I+Os, daily weights     6. History of CLL, hypothyroidism, COPD, lyme disease, hiatal hernia, herniated discs   - c/w home medications; verified with pt at the bedside   - Stop trazodone as pt states that he sleeps for a prolonged period of time after taking the medication   - f/u TFTs   - Case management/social work consults   - Counseled on smoking cessation, nicotine patch ordered     DVT ppx: Heparin 5,000 units Q12H (hold for PLT <50,000)   Code status: DNR/DNI (Had an extensive discussion with the pt at the bedside regarding goals of care. He stated that if his heart were to stop or he was to stop breathing at this moment he would not want to be resuscitated. Completed MOLST form with the pt at the bedside. The patient is A+Ox3 at the time of my evaluation and has full capacity. Able to make an informed decision and understands risks associated with decisions made. MOLST completed and placed into chart).     *Emergency contact Gume Garcia [phone]  61 y/o M presents with abdominal pain     1. Abdominal pain likely secondary to ileus, gastric distension, constipation   - Admit to med/surg   - NPO; advance diet as tolerated   - s/p 2L of NS, c/w NS at 100 ml/hr   - Pt had NGT placed in the ER which he pulled out; no evidence of bowel obstruction on CT would keep NGT out -> spoke to surgery resident who recommended placing NGT in if the pt has any episodes of vomiting   - AM abd XR   - Pain control: Tylenol, NSAIDs   - Avoid opiates   - Zofran for nausea, pepcid PRN for acid reflux   - Bowel regimen - Miralax, Senna, Suppository PRN  - Pt may need colonoscopy; however, he states there have been concerns giving him general anesthesia because of his COPD    - Surgery consult - Dr. Herrera   - GI consult - Dr. Ng     2. COPD exacerbation   - Pt with significant wheezing on exam   - Has not been taking any home medications   - Will start Symbicort and Spiriva  - Given 125 mg of Solumedrol in the ER, c/w 40 mg Q6H   - Pulmonology consult - Dr. Palacio     3. Leukocytosis with lymphocyte predominance secondary to CLL   - WBC 16.65, lymphocyte 64%, trend   - Concerns for failure to f/u outpt, will consult Heme/Onc - Dr. Castaneda     4. Polycythemia likely secondary to long-standing COPD   - Hb 19, HCT 56, , trend   - Ordered B12, folate, EPO level, JAK2      5. Acute kidney injury   - Cr 1.47 (baseline ~0.97), monitor closely   - s/p 2L of NS, c/w NS at 100 ml/hr (monitor for fluid overload)   - Avoid nephrotoxic medications   - Strict I+Os, daily weights     6. History of CLL, hypothyroidism, COPD, lyme disease, hiatal hernia, herniated discs   - c/w home medications; verified with pt at the bedside   - Stop trazodone as pt states that he sleeps for a prolonged period of time after taking the medication   - f/u TFTs   - Case management/social work consults   - Counseled on smoking cessation, nicotine patch ordered     DVT ppx: Heparin 5,000 units Q12H (hold for PLT <50,000)   Code status: DNR/DNI (Had an extensive discussion with the pt at the bedside regarding goals of care. He stated that if his heart were to stop or he was to stop breathing at this moment he would not want to be resuscitated. Completed MOLST form with the pt at the bedside. The patient is A+Ox3 at the time of my evaluation and has full capacity. Able to make an informed decision and understands risks associated with decisions made. MOLST completed and placed into chart).     *Emergency contact Gume Garcia [phone]
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
## 2022-06-29 NOTE — PROGRESS NOTE ADULT - ASSESSMENT
61 y/o M presents with abdominal pain     1. Abdominal pain likely secondary to ileus, gastric distension, constipation   - Admit to med/surg   - NPO; advance diet as tolerated   - s/p 2L of NS, c/w NS at 100 ml/hr   - Pt had NGT placed in the ER which he pulled out; no evidence of bowel obstruction on CT would keep NGT out -> spoke to surgery resident who recommended placing NGT in if the pt has any episodes of vomiting   - AM abd XR   - Pain control: Tylenol, NSAIDs   - Avoid opiates   - Zofran for nausea, pepcid PRN for acid reflux   - Bowel regimen - Miralax, Senna, Suppository PRN  - Pt may need colonoscopy; however, he states there have been concerns giving him general anesthesia because of his COPD    - Surgery consult - Dr. Herrera   - GI consult - Dr. Ng     2. COPD exacerbation   - Pt with significant wheezing on exam   - Has not been taking any home medications   - Will start Symbicort and Spiriva  - Given 125 mg of Solumedrol in the ER, c/w 40 mg Q6H   - Pulmonology consult - Dr. Palacio     3. Leukocytosis with lymphocyte predominance secondary to CLL   - WBC 16.65, lymphocyte 64%, trend   - Concerns for failure to f/u outpt, will consult Heme/Onc - Dr. Castaneda     4. Polycythemia likely secondary to long-standing COPD   - Hb 19, HCT 56, , trend   - Ordered B12, folate, EPO level, JAK2      5. Acute kidney injury   - Cr 1.47 (baseline ~0.97), monitor closely   - s/p 2L of NS, c/w NS at 100 ml/hr (monitor for fluid overload)   - Avoid nephrotoxic medications   - Strict I+Os, daily weights     6. History of CLL, hypothyroidism, COPD, lyme disease, hiatal hernia, herniated discs   - c/w home medications; verified with pt at the bedside   - Stop trazodone as pt states that he sleeps for a prolonged period of time after taking the medication   - f/u TFTs   - Case management/social work consults   - Counseled on smoking cessation, nicotine patch ordered     DVT ppx: Heparin 5,000 units Q12H (hold for PLT <50,000)   Code status: DNR/DNI (Had an extensive discussion with the pt at the bedside regarding goals of care. He stated that if his heart were to stop or he was to stop breathing at this moment he would not want to be resuscitated. Completed MOLST form with the pt at the bedside. The patient is A+Ox3 at the time of my evaluation and has full capacity. Able to make an informed decision and understands risks associated with decisions made. MOLST completed and placed into chart).     *Emergency contact Gume Garcia [phone]  61 y/o M presents with abdominal pain     Abdominal pain likely secondary to ileus, gastric distension, constipation   Hx of hiatal hernia  - CT A/P Marked gastric distention with debris; Large volume of semisolid stool in the rectosigmoid colon; No evidence for small bowel obstruction.  - s/p 2L of NS, c/w NS at 50 ml/hr   - Pt had NGT placed in the ER which he pulled out; no evidence of bowel obstruction on CT would keep NGT out -> spoke to surgery resident who recommended placing NGT in if the pt has any episodes of vomiting   - f/u abd XR   - Pain control: Tylenol, NSAIDs   - Avoid opiates   - Zofran for nausea, Pepcid PRN for acid reflux   - Bowel regimen - Miralax, Senna, Suppository PRN  - NPO after MN for EGD in am    - Surgery consult - Dr. Herrera   - GI consult - Dr. Ng     Acute on chronic COPD exacerbation   - Has not been taking any home medications   - Will start Symbicort and Spiriva  - Given 125 mg of Solumedrol in the ER, c/w 40 mg Q6H   - Pulmonology consult - Dr. Palacio     Leukocytosis with lymphocyte predominance secondary to CLL   - WBC 16.65, lymphocyte 64%, trend   - Concerns for failure to f/u outpt, will consult Heme/Onc - Dr. Castaneda     Polycythemia likely secondary to long-standing COPD   - Hb 19, HCT 56, , trend   - B12, folate, wnl; f/u EPO level, JAK2      Acute kidney injury  Acute urinary retention  - CT A/P no hydro or obstruction  - Cr 1.47 (baseline ~0.97), monitor closely   - s/p 2L of NS, c/w NS at 50 ml/hr (monitor for fluid overload)   - Avoid nephrotoxic medications   - Strict I+Os, daily weights     Hypothyroidism  - TSH elevated, FT4 low, FT3 low   - Increase synthroid from 125mcg to 137 daily   - f/u OP in 4 weeks for monitoring     Herniated discs   - CT A/P Degenerative changes of the spine with grade 1 anterolisthesis of L4 onL5 and a large disc herniation with a right paracentral component.  - Tylenol for pain     Nicotine Dependence   - Nicotine patch    VTE PPx: Heparin SQ (hold for PLT <50,000) on hold for procedure; ICDs     Code status: DNR/DNI  *Emergency contact Gume Radha [phone]     Dispo: NPO after MN for EGD in am

## 2022-06-29 NOTE — PROVIDER CONTACT NOTE (OTHER) - RECOMMENDATIONS
Bladder scan at a later time and re attempt straight cath if needed.
No recommendations provided by Maris (Hospitalist Medicine PA) at this time.

## 2022-06-29 NOTE — H&P ADULT - NSHPREVIEWOFSYSTEMS_GEN_ALL_CORE
Constitutional: negative for fatigue, negative for fever, negative for chills, negative for decreased appetite.  Skin: negative for rashes, negative for open wounds, negative for jaundice.   Eyes: negative for blurry vision, negative for double vision.   Ears, nose, throat: negative for ear pain, negative for nasal congestion, negative for sore throat, negative for lymph node swelling.   Cardiovascular: negative for chest pain, negative for palpitations, negative for lower extremity swelling.   Respiratory: negative for shortness of breath, negative for wheezing, negative for cough.   Gastrointestinal: positive for abdominal pain, negative for nausea, positive for vomiting, negative for diarrhea, negative for constipation, negative for blood in the stool, negative for black tarry stools.   Genitourinary: negative for burning on urination, negative for urinary urgency or frequency, negative for blood in the urine.   Endocrine: negative for cold intolerance, negative for heat intolerance, negative for increased thirst.   Hematologic: negative for easy bruising or bleeding.   Musculoskeletal: negative for muscle/joint pain, negative for decreased range of motion.   Neurological: negative for dizziness, negative for headaches, negative for loss of consciousness, negative for motor weakness, negative for sensory deficits.   Psychiatric: negative for depression, negative for anxiety.

## 2022-06-29 NOTE — PATIENT PROFILE ADULT - FALL HARM RISK - HARM RISK INTERVENTIONS

## 2022-06-29 NOTE — CONSULT NOTE ADULT - ASSESSMENT
62 y/o male with multiple medical problems including CLL, diagnosed with stage 0 CLL in 2020.  No evidence of progression.  He is a/w abdominal pain and ileus,     # Diagnosed with stage 0 CLL in 2020.  Monitored outpatient by Dr. Castaneda  No evidence of progression.   WBC 13.27  Monitor clinically and blood work.    # Abdominal pain likely secondary to ileus, gastric distension, constipation   Medical management per primary team.

## 2022-06-29 NOTE — H&P ADULT - NSHPPHYSICALEXAM_GEN_ALL_CORE
ICU Vital Signs Last 24 Hrs  T(C): 36.6 (29 Jun 2022 00:10), Max: 36.6 (29 Jun 2022 00:10)  T(F): 97.8 (29 Jun 2022 00:10), Max: 97.8 (29 Jun 2022 00:10)  HR: 75 (29 Jun 2022 00:10) (70 - 75)  BP: 95/70 (29 Jun 2022 00:10) (95/70 - 95/74)  BP(mean): 79 (29 Jun 2022 00:10) (79 - 79)  RR: 18 (29 Jun 2022 00:10) (18 - 19)  SpO2: 98% (29 Jun 2022 00:10) (95% - 98%)    General: Awake and alert, cooperative with exam. No acute distress.   Skin: Warm, dry, and pink.   Eyes: Pupils equal and reactive to light. Extraocular eye movements intact. No conjunctival injection, discharge, or scleral icterus.   HEENT: Atraumatic, normocephalic. Moist mucus membranes.   Cardiology: Normal S1, S2. No murmurs, rubs, or gallops. Regular rate and rhythm.   Respiratory: Lungs clear to ascultation bilaterally. Good air exchange. No wheezes, rales, or rhonchi. Normal chest expansion.   Gastrointestinal: Positive bowel sounds. Soft, non-distended. No guarding, rigidity, or rebound tenderness. No hepatosplenomegaly. Tenderness on superficial and deep palpation of the abdomen diffusely.   Musculoskeletal: 5/5 motor strength in all extremities. Normal range of motion.   Extremities: No peripheral edema bilaterally. Dorsalis pedis pulses 2+ bilaterally.   Neurological: A+Ox3 (person, place, and time). Cranial nerves 2-12 intact. Normal speech. No facial droop. No focal neurological deficits.  Psychiatric: Normal affect. Normal mood.

## 2022-06-29 NOTE — H&P ADULT - NSICDXPASTMEDICALHX_GEN_ALL_CORE_FT
PAST MEDICAL HISTORY:  Chronic obstructive pulmonary disease (COPD)     CLL (chronic lymphocytic leukemia)     H/o Lyme disease     Hiatal hernia     History of herniated intervertebral disc     Hypothyroidism

## 2022-06-29 NOTE — PROVIDER CONTACT NOTE (OTHER) - REASON
Consult
Pt unable to tolerate getting a NG tube put in
BP: 87/69 (76, HR: 53, Pt has not urinated and bladder scan reveals 387. Pt refusing straight cath.

## 2022-06-29 NOTE — PROVIDER CONTACT NOTE (OTHER) - ASSESSMENT
Pt here for abdominal pain, found to have semisolid stool in intestine
Pt stable, not vomiting, attempting to urinate, Pt states he does not have urge to urinate but is trying

## 2022-06-29 NOTE — PROVIDER CONTACT NOTE (OTHER) - NAME OF MD/NP/PA/DO NOTIFIED:
Dr. Martinez
Dr. Ng (Gastroenterology)
Dr. Palacio (Pulmonary)
Dr. Castaneda (Oncology)
Maris Hospitalist PA (Medicine)

## 2022-06-29 NOTE — H&P ADULT - CONVERSATION DETAILS
DNR/DNI (Had an extensive discussion with the pt at the bedside regarding goals of care. He stated that if his heart were to stop or he was to stop breathing at this moment he would not want to be resuscitated. Completed MOLST form with the pt at the bedside. The patient is A+Ox3 at the time of my evaluation and has full capacity. Able to make an informed decision and understands risks associated with decisions made. MOLST completed and placed into chart).

## 2022-06-29 NOTE — PATIENT PROFILE ADULT - MEDICATION/VISITS - DETAILS
pt has not had certain medications due to insurance changes; states he hasn't had his omeprazole in over a year
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
## 2022-06-29 NOTE — H&P ADULT - NSHPSOCIALHISTORY_GEN_ALL_CORE
Pt lives with his brother. Independent with ADLs and IADLs. Smoked 3-5 ppd previously, down to 1/4 to 1/2 ppd. Has smoked for 53 years. Denies alcohol and drug use.

## 2022-06-30 ENCOUNTER — RESULT REVIEW (OUTPATIENT)
Age: 62
End: 2022-06-30

## 2022-06-30 LAB
A1C WITH ESTIMATED AVERAGE GLUCOSE RESULT: 5.4 % — SIGNIFICANT CHANGE UP (ref 4–5.6)
ANION GAP SERPL CALC-SCNC: 5 MMOL/L — SIGNIFICANT CHANGE UP (ref 5–17)
BUN SERPL-MCNC: 11 MG/DL — SIGNIFICANT CHANGE UP (ref 7–23)
CALCIUM SERPL-MCNC: 8.9 MG/DL — SIGNIFICANT CHANGE UP (ref 8.5–10.1)
CHLORIDE SERPL-SCNC: 113 MMOL/L — HIGH (ref 96–108)
CO2 SERPL-SCNC: 23 MMOL/L — SIGNIFICANT CHANGE UP (ref 22–31)
CREAT SERPL-MCNC: 0.91 MG/DL — SIGNIFICANT CHANGE UP (ref 0.5–1.3)
EGFR: 96 ML/MIN/1.73M2 — SIGNIFICANT CHANGE UP
EPO SERPL-MCNC: 5.3 MIU/ML — SIGNIFICANT CHANGE UP (ref 2.6–18.5)
ESTIMATED AVERAGE GLUCOSE: 108 MG/DL — SIGNIFICANT CHANGE UP (ref 68–114)
GLUCOSE SERPL-MCNC: 134 MG/DL — HIGH (ref 70–99)
HCT VFR BLD CALC: 40.7 % — SIGNIFICANT CHANGE UP (ref 39–50)
HGB BLD-MCNC: 14.1 G/DL — SIGNIFICANT CHANGE UP (ref 13–17)
MAGNESIUM SERPL-MCNC: 2.3 MG/DL — SIGNIFICANT CHANGE UP (ref 1.6–2.6)
MCHC RBC-ENTMCNC: 34.6 GM/DL — SIGNIFICANT CHANGE UP (ref 32–36)
MCHC RBC-ENTMCNC: 35.2 PG — HIGH (ref 27–34)
MCV RBC AUTO: 101.5 FL — HIGH (ref 80–100)
NT-PROBNP SERPL-SCNC: 80 PG/ML — SIGNIFICANT CHANGE UP (ref 0–125)
PHOSPHATE SERPL-MCNC: 2.7 MG/DL — SIGNIFICANT CHANGE UP (ref 2.5–4.5)
PLATELET # BLD AUTO: 224 K/UL — SIGNIFICANT CHANGE UP (ref 150–400)
POTASSIUM SERPL-MCNC: 4.5 MMOL/L — SIGNIFICANT CHANGE UP (ref 3.5–5.3)
POTASSIUM SERPL-SCNC: 4.5 MMOL/L — SIGNIFICANT CHANGE UP (ref 3.5–5.3)
RBC # BLD: 4.01 M/UL — LOW (ref 4.2–5.8)
RBC # FLD: 13.2 % — SIGNIFICANT CHANGE UP (ref 10.3–14.5)
SODIUM SERPL-SCNC: 141 MMOL/L — SIGNIFICANT CHANGE UP (ref 135–145)
WBC # BLD: 17.7 K/UL — HIGH (ref 3.8–10.5)
WBC # FLD AUTO: 17.7 K/UL — HIGH (ref 3.8–10.5)

## 2022-06-30 PROCEDURE — 88305 TISSUE EXAM BY PATHOLOGIST: CPT | Mod: 26

## 2022-06-30 PROCEDURE — 99232 SBSQ HOSP IP/OBS MODERATE 35: CPT

## 2022-06-30 PROCEDURE — 76705 ECHO EXAM OF ABDOMEN: CPT | Mod: 26

## 2022-06-30 PROCEDURE — 88312 SPECIAL STAINS GROUP 1: CPT | Mod: 26

## 2022-06-30 RX ORDER — FENTANYL CITRATE 50 UG/ML
25 INJECTION INTRAVENOUS
Refills: 0 | Status: DISCONTINUED | OUTPATIENT
Start: 2022-06-30 | End: 2022-06-30

## 2022-06-30 RX ORDER — SODIUM CHLORIDE 9 MG/ML
600 INJECTION, SOLUTION INTRAVENOUS ONCE
Refills: 0 | Status: COMPLETED | OUTPATIENT
Start: 2022-06-30 | End: 2022-06-30

## 2022-06-30 RX ORDER — ONDANSETRON 8 MG/1
4 TABLET, FILM COATED ORAL ONCE
Refills: 0 | Status: DISCONTINUED | OUTPATIENT
Start: 2022-06-30 | End: 2022-06-30

## 2022-06-30 RX ORDER — SODIUM CHLORIDE 9 MG/ML
1000 INJECTION, SOLUTION INTRAVENOUS
Refills: 0 | Status: DISCONTINUED | OUTPATIENT
Start: 2022-06-30 | End: 2022-06-30

## 2022-06-30 RX ORDER — OXYCODONE HYDROCHLORIDE 5 MG/1
5 TABLET ORAL ONCE
Refills: 0 | Status: DISCONTINUED | OUTPATIENT
Start: 2022-06-30 | End: 2022-06-30

## 2022-06-30 RX ORDER — HEPARIN SODIUM 5000 [USP'U]/ML
5000 INJECTION INTRAVENOUS; SUBCUTANEOUS EVERY 12 HOURS
Refills: 0 | Status: DISCONTINUED | OUTPATIENT
Start: 2022-06-30 | End: 2022-07-01

## 2022-06-30 RX ORDER — POLYETHYLENE GLYCOL 3350 17 G/17G
17 POWDER, FOR SOLUTION ORAL
Refills: 0 | Status: DISCONTINUED | OUTPATIENT
Start: 2022-06-30 | End: 2022-06-30

## 2022-06-30 RX ORDER — SOD SULF/SODIUM/NAHCO3/KCL/PEG
1000 SOLUTION, RECONSTITUTED, ORAL ORAL ONCE
Refills: 0 | Status: COMPLETED | OUTPATIENT
Start: 2022-06-30 | End: 2022-06-30

## 2022-06-30 RX ORDER — ACETAMINOPHEN 500 MG
1000 TABLET ORAL ONCE
Refills: 0 | Status: DISCONTINUED | OUTPATIENT
Start: 2022-06-30 | End: 2022-06-30

## 2022-06-30 RX ADMIN — PANTOPRAZOLE SODIUM 40 MILLIGRAM(S): 20 TABLET, DELAYED RELEASE ORAL at 22:21

## 2022-06-30 RX ADMIN — DULOXETINE HYDROCHLORIDE 60 MILLIGRAM(S): 30 CAPSULE, DELAYED RELEASE ORAL at 12:15

## 2022-06-30 RX ADMIN — Medication 40 MILLIGRAM(S): at 00:01

## 2022-06-30 RX ADMIN — POLYETHYLENE GLYCOL 3350 17 GRAM(S): 17 POWDER, FOR SOLUTION ORAL at 12:15

## 2022-06-30 RX ADMIN — Medication 40 MILLIGRAM(S): at 12:17

## 2022-06-30 RX ADMIN — Medication 1 PATCH: at 20:00

## 2022-06-30 RX ADMIN — Medication 137 MICROGRAM(S): at 05:23

## 2022-06-30 RX ADMIN — Medication 40 MILLIGRAM(S): at 05:23

## 2022-06-30 RX ADMIN — BUDESONIDE AND FORMOTEROL FUMARATE DIHYDRATE 2 PUFF(S): 160; 4.5 AEROSOL RESPIRATORY (INHALATION) at 21:04

## 2022-06-30 RX ADMIN — SODIUM CHLORIDE 50 MILLILITER(S): 9 INJECTION INTRAMUSCULAR; INTRAVENOUS; SUBCUTANEOUS at 00:09

## 2022-06-30 RX ADMIN — SODIUM CHLORIDE 1800 MILLILITER(S): 9 INJECTION, SOLUTION INTRAVENOUS at 08:40

## 2022-06-30 RX ADMIN — Medication 1 PATCH: at 12:16

## 2022-06-30 RX ADMIN — Medication 1000 MILLILITER(S): at 20:20

## 2022-06-30 RX ADMIN — SODIUM CHLORIDE 125 MILLILITER(S): 9 INJECTION, SOLUTION INTRAVENOUS at 09:18

## 2022-06-30 RX ADMIN — PANTOPRAZOLE SODIUM 40 MILLIGRAM(S): 20 TABLET, DELAYED RELEASE ORAL at 05:22

## 2022-06-30 NOTE — DIETITIAN INITIAL EVALUATION ADULT - PERTINENT MEDS FT
MEDICATIONS  (STANDING):  budesonide 160 MICROgram(s)/formoterol 4.5 MICROgram(s) Inhaler 2 Puff(s) Inhalation two times a day  DULoxetine 60 milliGRAM(s) Oral daily  influenza   Vaccine 0.5 milliLiter(s) IntraMuscular once  lactated ringers. 1000 milliLiter(s) (125 mL/Hr) IV Continuous <Continuous>  levothyroxine 137 MICROGram(s) Oral daily  methylPREDNISolone sodium succinate Injectable 40 milliGRAM(s) IV Push every 6 hours  nicotine -  14 mG/24Hr(s) Patch 1 Patch Transdermal daily  pantoprazole  Injectable 40 milliGRAM(s) IV Push two times a day  polyethylene glycol 3350 17 Gram(s) Oral daily  senna 2 Tablet(s) Oral at bedtime  sodium chloride 0.9%. 1000 milliLiter(s) (50 mL/Hr) IV Continuous <Continuous>  tiotropium 18 MICROgram(s) Capsule 1 Capsule(s) Inhalation daily    MEDICATIONS  (PRN):  acetaminophen     Tablet .. 650 milliGRAM(s) Oral every 6 hours PRN Temp greater or equal to 38C (100.4F), Mild Pain (1 - 3)  acetaminophen   IVPB .. 1000 milliGRAM(s) IV Intermittent once PRN Mild Pain (1 - 3)  ALBUTerol    90 MICROgram(s) HFA Inhaler 2 Puff(s) Inhalation every 6 hours PRN Bronchospasm  aluminum hydroxide/magnesium hydroxide/simethicone Suspension 30 milliLiter(s) Oral every 4 hours PRN Dyspepsia  bisacodyl 5 milliGRAM(s) Oral daily PRN Constipation  fentaNYL    Injectable 25 MICROGram(s) IV Push every 5 minutes PRN Moderate Pain (4 - 6)  melatonin 3 milliGRAM(s) Oral at bedtime PRN Insomnia  morphine  - Injectable 4 milliGRAM(s) IV Push every 4 hours PRN Severe Pain (7 - 10)  ondansetron Injectable 4 milliGRAM(s) IV Push once PRN Nausea and/or Vomiting  ondansetron Injectable 4 milliGRAM(s) IV Push every 8 hours PRN Nausea and/or Vomiting  oxyCODONE    IR 5 milliGRAM(s) Oral once PRN Moderate Pain (4 - 6)

## 2022-06-30 NOTE — PROGRESS NOTE ADULT - ASSESSMENT
63 y/o M presents with abdominal pain     Abdominal pain likely secondary to ileus, gastric distension, constipation   Hx of hiatal hernia  - CT A/P Marked gastric distention with debris; Large volume of semisolid stool in the rectosigmoid colon; No evidence for small bowel obstruction.  - EGD Normal esophagus; Normal stomach; Normal examined duodenum  - RUQ US No gallbladder disease or other acute finding; Trace ascites  - s/p 2L of NS, c/w NS at 50 ml/hr; stop IVF now tolerating PO   - Pt had NGT placed in the ER which he pulled out; no evidence of bowel obstruction on CT would keep NGT out -> spoke to surgery resident who recommended placing NGT in if the pt has any episodes of vomiting   - f/u abd XR   - Pain control: Tylenol  - Avoid opiates   - Zofran for nausea, Pepcid PRN for acid reflux   - Bowel regimen - Miralax, Senna, Suppository PRN  - Surgery consult - Dr. Herrera   - GI consult - Dr. Ng     Acute on chronic COPD exacerbation   - Has not been taking any home medications   - Will start Symbicort and Spiriva  - Given 125 mg of Solumedrol in the ER, c/w 40 mg Q6H--> Start PO 40mg tomorrow x 2 days   - Supplemental O2 prn   - Pulmonology consult - Dr. Palacio     Leukocytosis with lymphocyte predominance secondary to CLL   - WBC 16.65, lymphocyte 64%, trend   - Concerns for failure to f/u outpt, will consult Heme/Onc - Dr. Castaneda     Polycythemia likely secondary to long-standing COPD   - Hb 19, HCT 56, , trend   - B12, folate, wnl    Acute kidney injury, resolved   Acute urinary retention  - CT A/P no hydro or obstruction  - s/p 2L of NS, c/w NS at 50 ml/hr (monitor for fluid overload); stop IVF   - Avoid nephrotoxic medications   - Strict I+Os, daily weights     Hypothyroidism  - TSH elevated, FT4 low, FT3 low   - Increase synthroid from 125mcg to 137 daily   - f/u OP in 4 weeks for monitoring     Herniated discs   - CT A/P Degenerative changes of the spine with grade 1 anterolisthesis of L4 onL5 and a large disc herniation with a right paracentral component.  - Tylenol for pain     Nicotine Dependence   - Nicotine patch    VTE PPx: Heparin SQ (hold for PLT <50,000)      Code status: DNR/DNI  *Emergency contact Gume Radha [phone]     Dispo: taper steroids, awaiting BM

## 2022-06-30 NOTE — PROGRESS NOTE ADULT - NUTRITIONAL ASSESSMENT
This patient has been assessed with a concern for Malnutrition and has been determined to have a diagnosis/diagnoses of Severe protein-calorie malnutrition and Underweight (BMI < 19).    This patient is being managed with:   Diet Regular-  Entered: Jun 30 2022  8:44AM

## 2022-06-30 NOTE — DIETITIAN INITIAL EVALUATION ADULT - ORAL INTAKE PTA/DIET HISTORY
Pt reports decreased appetite/po intake PTA x 3-5 days, no dietary restrictions, drinks Ensure one/day, cooks for himself, no teeth, no dentures.
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
## 2022-06-30 NOTE — DIETITIAN INITIAL EVALUATION ADULT - ADD RECOMMEND
1. Advance diet to Clear Liquids to Full Liquids to Low Fiber, when medically feasible and as tolerated   2. Add Ensure Enlive TID, Gelatein one/day to help pt optimize ENN po intake and maintain wt.  3. Consider adding thiamine 100 mg daily 2/2 poor PO intake/ malnutrition and MVI w/ minerals daily to ensure 100% RDA met   4. Monitor bowel movements, if no BM for >3 days, consider implementing bowel regimen.   5. RDN will continue to monitor PO intake, labs, hydration, and wt prn.
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
## 2022-06-30 NOTE — DIETITIAN INITIAL EVALUATION ADULT - NSICDXPASTMEDICALHX_GEN_ALL_CORE_FT
Responsibility to family and others/Supportive social network or family PAST MEDICAL HISTORY:  Chronic obstructive pulmonary disease (COPD)     CLL (chronic lymphocytic leukemia)     H/o Lyme disease     Hiatal hernia     History of herniated intervertebral disc     Hypothyroidism

## 2022-06-30 NOTE — PROGRESS NOTE ADULT - NS ATTEND AMEND GEN_ALL_CORE FT
Appreciate ACP help.  Pt seen and examined with TONY Arreguin; all labs and imaging reviewed together. POC discussed.  The above documentation reviewed by me and contains my recommendations.
Appreciate ACP help.  Pt seen and examined with TONY Arreguin; all labs and imaging reviewed together. POC discussed.  The above documentation reviewed by me and contains my recommendations.

## 2022-06-30 NOTE — DIETITIAN INITIAL EVALUATION ADULT - PERTINENT LABORATORY DATA
06-30    141  |  113<H>  |  11  ----------------------------<  134<H>  4.5   |  23  |  0.91    Ca    8.9      30 Jun 2022 07:45  Phos  2.7     06-30  Mg     2.3     06-30    TPro  5.5<L>  /  Alb  3.3  /  TBili  0.2  /  DBili  x   /  AST  21  /  ALT  23  /  AlkPhos  56  06-29

## 2022-06-30 NOTE — DIETITIAN INITIAL EVALUATION ADULT - POUNDS LOST/GAINED
results reviewed and d/w patient.  Will start steroid, albuterol.  F/U with PMD. Pt instructed to return if any worsening symptoms or concerns.  They verbalize understanding.
5

## 2022-07-01 ENCOUNTER — TRANSCRIPTION ENCOUNTER (OUTPATIENT)
Age: 62
End: 2022-07-01

## 2022-07-01 VITALS
OXYGEN SATURATION: 94 % | DIASTOLIC BLOOD PRESSURE: 62 MMHG | TEMPERATURE: 98 F | RESPIRATION RATE: 17 BRPM | HEART RATE: 62 BPM | SYSTOLIC BLOOD PRESSURE: 104 MMHG

## 2022-07-01 DIAGNOSIS — K56.7 ILEUS, UNSPECIFIED: ICD-10-CM

## 2022-07-01 LAB
ALBUMIN SERPL ELPH-MCNC: 3 G/DL — LOW (ref 3.3–5)
ALP SERPL-CCNC: 74 U/L — SIGNIFICANT CHANGE UP (ref 40–120)
ALT FLD-CCNC: 21 U/L — SIGNIFICANT CHANGE UP (ref 12–78)
ANION GAP SERPL CALC-SCNC: 4 MMOL/L — LOW (ref 5–17)
AST SERPL-CCNC: 19 U/L — SIGNIFICANT CHANGE UP (ref 15–37)
BILIRUB DIRECT SERPL-MCNC: 0.1 MG/DL — SIGNIFICANT CHANGE UP (ref 0–0.3)
BILIRUB INDIRECT FLD-MCNC: 0.1 MG/DL — LOW (ref 0.2–1)
BILIRUB SERPL-MCNC: 0.2 MG/DL — SIGNIFICANT CHANGE UP (ref 0.2–1.2)
BUN SERPL-MCNC: 20 MG/DL — SIGNIFICANT CHANGE UP (ref 7–23)
CALCIUM SERPL-MCNC: 9.1 MG/DL — SIGNIFICANT CHANGE UP (ref 8.5–10.1)
CHLORIDE SERPL-SCNC: 109 MMOL/L — HIGH (ref 96–108)
CO2 SERPL-SCNC: 28 MMOL/L — SIGNIFICANT CHANGE UP (ref 22–31)
CREAT SERPL-MCNC: 1.01 MG/DL — SIGNIFICANT CHANGE UP (ref 0.5–1.3)
EGFR: 85 ML/MIN/1.73M2 — SIGNIFICANT CHANGE UP
GLUCOSE SERPL-MCNC: 94 MG/DL — SIGNIFICANT CHANGE UP (ref 70–99)
HCT VFR BLD CALC: 36.7 % — LOW (ref 39–50)
HGB BLD-MCNC: 12.4 G/DL — LOW (ref 13–17)
MAGNESIUM SERPL-MCNC: 2.1 MG/DL — SIGNIFICANT CHANGE UP (ref 1.6–2.6)
MCHC RBC-ENTMCNC: 33.8 GM/DL — SIGNIFICANT CHANGE UP (ref 32–36)
MCHC RBC-ENTMCNC: 34.6 PG — HIGH (ref 27–34)
MCV RBC AUTO: 102.5 FL — HIGH (ref 80–100)
PHOSPHATE SERPL-MCNC: 2.6 MG/DL — SIGNIFICANT CHANGE UP (ref 2.5–4.5)
PLATELET # BLD AUTO: 213 K/UL — SIGNIFICANT CHANGE UP (ref 150–400)
POTASSIUM SERPL-MCNC: 3.8 MMOL/L — SIGNIFICANT CHANGE UP (ref 3.5–5.3)
POTASSIUM SERPL-SCNC: 3.8 MMOL/L — SIGNIFICANT CHANGE UP (ref 3.5–5.3)
PROT SERPL-MCNC: 5.5 GM/DL — LOW (ref 6–8.3)
RBC # BLD: 3.58 M/UL — LOW (ref 4.2–5.8)
RBC # FLD: 13.1 % — SIGNIFICANT CHANGE UP (ref 10.3–14.5)
SODIUM SERPL-SCNC: 141 MMOL/L — SIGNIFICANT CHANGE UP (ref 135–145)
WBC # BLD: 23.91 K/UL — HIGH (ref 3.8–10.5)
WBC # FLD AUTO: 23.91 K/UL — HIGH (ref 3.8–10.5)

## 2022-07-01 PROCEDURE — 99239 HOSP IP/OBS DSCHRG MGMT >30: CPT

## 2022-07-01 RX ORDER — ALBUTEROL 90 UG/1
2 AEROSOL, METERED ORAL
Qty: 0 | Refills: 0 | DISCHARGE

## 2022-07-01 RX ORDER — TIOTROPIUM BROMIDE AND OLODATEROL 3.124; 2.736 UG/1; UG/1
2 SPRAY, METERED RESPIRATORY (INHALATION)
Qty: 0 | Refills: 0 | DISCHARGE

## 2022-07-01 RX ORDER — DULOXETINE HYDROCHLORIDE 30 MG/1
1 CAPSULE, DELAYED RELEASE ORAL
Qty: 30 | Refills: 0
Start: 2022-07-01 | End: 2022-07-30

## 2022-07-01 RX ORDER — DULOXETINE HYDROCHLORIDE 30 MG/1
1 CAPSULE, DELAYED RELEASE ORAL
Qty: 0 | Refills: 0 | DISCHARGE

## 2022-07-01 RX ORDER — OMEPRAZOLE 10 MG/1
1 CAPSULE, DELAYED RELEASE ORAL
Qty: 0 | Refills: 0 | DISCHARGE

## 2022-07-01 RX ORDER — BUDESONIDE AND FORMOTEROL FUMARATE DIHYDRATE 160; 4.5 UG/1; UG/1
2 AEROSOL RESPIRATORY (INHALATION)
Qty: 120 | Refills: 0
Start: 2022-07-01 | End: 2022-07-30

## 2022-07-01 RX ORDER — TRAZODONE HCL 50 MG
1 TABLET ORAL
Qty: 0 | Refills: 0 | DISCHARGE

## 2022-07-01 RX ORDER — LEVOTHYROXINE SODIUM 125 MCG
1 TABLET ORAL
Qty: 0 | Refills: 0 | DISCHARGE

## 2022-07-01 RX ORDER — TIOTROPIUM BROMIDE 18 UG/1
1 CAPSULE ORAL; RESPIRATORY (INHALATION)
Qty: 1 | Refills: 0
Start: 2022-07-01 | End: 2022-07-30

## 2022-07-01 RX ORDER — ALBUTEROL 90 UG/1
2 AEROSOL, METERED ORAL
Qty: 1 | Refills: 0
Start: 2022-07-01 | End: 2022-07-30

## 2022-07-01 RX ORDER — ACETAMINOPHEN 500 MG
2 TABLET ORAL
Qty: 0 | Refills: 0 | DISCHARGE
Start: 2022-07-01

## 2022-07-01 RX ORDER — TRAZODONE HCL 50 MG
1 TABLET ORAL
Qty: 30 | Refills: 0
Start: 2022-07-01 | End: 2022-07-30

## 2022-07-01 RX ORDER — OMEPRAZOLE 10 MG/1
1 CAPSULE, DELAYED RELEASE ORAL
Qty: 30 | Refills: 0
Start: 2022-07-01 | End: 2022-07-30

## 2022-07-01 RX ORDER — LEVOTHYROXINE SODIUM 125 MCG
1 TABLET ORAL
Qty: 30 | Refills: 0
Start: 2022-07-01 | End: 2022-07-30

## 2022-07-01 RX ORDER — MULTIVIT WITH MIN/MFOLATE/K2 340-15/3 G
1 POWDER (GRAM) ORAL ONCE
Refills: 0 | Status: COMPLETED | OUTPATIENT
Start: 2022-07-01 | End: 2022-07-01

## 2022-07-01 RX ADMIN — HEPARIN SODIUM 5000 UNIT(S): 5000 INJECTION INTRAVENOUS; SUBCUTANEOUS at 09:38

## 2022-07-01 RX ADMIN — PANTOPRAZOLE SODIUM 40 MILLIGRAM(S): 20 TABLET, DELAYED RELEASE ORAL at 09:39

## 2022-07-01 RX ADMIN — BUDESONIDE AND FORMOTEROL FUMARATE DIHYDRATE 2 PUFF(S): 160; 4.5 AEROSOL RESPIRATORY (INHALATION) at 08:41

## 2022-07-01 RX ADMIN — Medication 1 PATCH: at 09:38

## 2022-07-01 RX ADMIN — TIOTROPIUM BROMIDE 1 CAPSULE(S): 18 CAPSULE ORAL; RESPIRATORY (INHALATION) at 08:42

## 2022-07-01 RX ADMIN — DULOXETINE HYDROCHLORIDE 60 MILLIGRAM(S): 30 CAPSULE, DELAYED RELEASE ORAL at 09:37

## 2022-07-01 RX ADMIN — Medication 5 MILLIGRAM(S): at 09:37

## 2022-07-01 RX ADMIN — Medication 137 MICROGRAM(S): at 06:36

## 2022-07-01 RX ADMIN — Medication 1 BOTTLE: at 10:22

## 2022-07-01 RX ADMIN — Medication 40 MILLIGRAM(S): at 10:21

## 2022-07-01 NOTE — DISCHARGE NOTE NURSING/CASE MANAGEMENT/SOCIAL WORK - NSDCPEFALRISK_GEN_ALL_CORE
For information on Fall & Injury Prevention, visit: [email or web address] OR  [email or web address] OR  https://www.cdc.gov/steadi/patient.html

## 2022-07-01 NOTE — DISCHARGE NOTE PROVIDER - ATTENDING DISCHARGE PHYSICAL EXAMINATION:
PHYSICAL EXAM:  Vital Signs Last 24 Hrs  T(C): 36.8 (01 Jul 2022 09:10), Max: 36.8 (30 Jun 2022 20:57)  T(F): 98.2 (01 Jul 2022 09:10), Max: 98.2 (30 Jun 2022 20:57)  HR: 59 (01 Jul 2022 09:10) (59 - 70)  BP: 107/66 (01 Jul 2022 09:10) (103/73 - 107/66)  RR: 16 (01 Jul 2022 09:10) (16 - 18)  SpO2: 95% (01 Jul 2022 09:10) (92% - 96%)  GENERAL: NAD, able to lie flat in bed  HEAD:  Atraumatic, Normocephalic  EYES: EOMI, PERRLA, normal sclera  ENT: Moist mucous membranes  NECK: Supple, No JVD, no nuchal rigidity  CHEST/LUNG: Clear to auscultation bilaterally; No rales, rhonchi, wheezing, or rubs. Unlabored respirations  HEART: Regular rate and rhythm; No murmurs, rubs, or gallops  ABDOMEN: Bowel sounds present; Soft, Nontender, Nondistended. No hepatomegaly  EXTREMITIES:  no pitting bilaterally  NERVOUS SYSTEM:  Alert & Oriented X3, speech clear. No focal motor or sensory deficits  MSK: FROM all 4 extremities, full and equal strength  SKIN: No rashes or lesions

## 2022-07-01 NOTE — DISCHARGE NOTE NURSING/CASE MANAGEMENT/SOCIAL WORK - NSDCPEWEB_GEN_ALL_CORE
[facility] for Tobacco Control website --- [email or web address] website --- [email or web address]

## 2022-07-01 NOTE — DISCHARGE NOTE PROVIDER - CARE PROVIDER_API CALL
Devaughn Ng)  Gastroenterology; Internal Medicine  [address], [address]  [place] [address]  Phone: [phone]  Fax: [phone]  Follow Up Time: 1 week    Erasmo Palacio)  Internal Medicine; Pulmonary Disease  [address], [address] [place] [address]  Phone: [phone]  Fax: [phone]  Follow Up Time: 1 week    Valorie Pat  HEMATOLOGY  [address], [place] [address]  Phone: [phone]  Fax: [phone]  Follow Up Time: 1 week    June Eddy  Phone: (   )    -  Fax: (   )    -  Follow Up Time: 1 week

## 2022-07-01 NOTE — DISCHARGE NOTE NURSING/CASE MANAGEMENT/SOCIAL WORK - PATIENT PORTAL LINK FT
You can access the FollowMyHealth Patient Portal offered by [facility] by registering at the following website: [email or web address]. By joining [facility]’s FollowMyHealth portal, you will also be able to view your health information using other applications (apps) compatible with our system.

## 2022-07-01 NOTE — DISCHARGE NOTE PROVIDER - NSDCMRMEDTOKEN_GEN_ALL_CORE_FT
acetaminophen 325 mg oral tablet: 2 tab(s) orally every 6 hours, As needed, Temp greater or equal to 38C (100.4F), Mild Pain (1 - 3)  Albuterol (Eqv-ProAir HFA) 90 mcg/inh inhalation aerosol: 2 puff(s) inhaled every 6 hours, As Needed   bisacodyl 5 mg oral delayed release tablet: 1 tab(s) orally every 12 hours, As Needed  budesonide-formoterol 160 mcg-4.5 mcg/inh inhalation aerosol: 2 puff(s) inhaled 2 times a day   DULoxetine 60 mg oral delayed release capsule: 1 cap(s) orally once a day  levothyroxine 137 mcg (0.137 mg) oral tablet: 1 tab(s) orally once a day  Nicoderm C-Q 14 mg/24 hr transdermal film, extended release: 1 patch transdermal once a day  omeprazole 40 mg oral delayed release capsule: 1 cap(s) orally once a day, As Needed  predniSONE 20 mg oral tablet: 2 tab(s) orally once a day  tiotropium 18 mcg inhalation capsule: 1 cap(s) inhaled once a day  traZODone 100 mg oral tablet: 1 tab(s) orally once a day (at bedtime)  Voltaren 1% topical gel: Apply topically to affected area , As Needed

## 2022-07-01 NOTE — PROGRESS NOTE ADULT - SUBJECTIVE AND OBJECTIVE BOX
Patient is a 62y old  Male who presents with a chief complaint of Abdominal pain (01 Jul 2022 11:44)      Subective:  Feels good now  No pain or N/V  Vijaya POs    PAST MEDICAL & SURGICAL HISTORY:  CLL (chronic lymphocytic leukemia)      Chronic obstructive pulmonary disease (COPD)      Hypothyroidism      H/o Lyme disease      Hiatal hernia      History of herniated intervertebral disc      H/O sinus surgery      H/O shoulder surgery          MEDICATIONS  (STANDING):  bisacodyl 5 milliGRAM(s) Oral every 12 hours  budesonide 160 MICROgram(s)/formoterol 4.5 MICROgram(s) Inhaler 2 Puff(s) Inhalation two times a day  DULoxetine 60 milliGRAM(s) Oral daily  heparin   Injectable 5000 Unit(s) SubCutaneous every 12 hours  influenza   Vaccine 0.5 milliLiter(s) IntraMuscular once  levothyroxine 137 MICROGram(s) Oral daily  nicotine -  14 mG/24Hr(s) Patch 1 Patch Transdermal daily  pantoprazole  Injectable 40 milliGRAM(s) IV Push two times a day  predniSONE   Tablet 40 milliGRAM(s) Oral daily  tiotropium 18 MICROgram(s) Capsule 1 Capsule(s) Inhalation daily    MEDICATIONS  (PRN):  acetaminophen     Tablet .. 650 milliGRAM(s) Oral every 6 hours PRN Temp greater or equal to 38C (100.4F), Mild Pain (1 - 3)  ALBUTerol    90 MICROgram(s) HFA Inhaler 2 Puff(s) Inhalation every 6 hours PRN Bronchospasm  aluminum hydroxide/magnesium hydroxide/simethicone Suspension 30 milliLiter(s) Oral every 4 hours PRN Dyspepsia  melatonin 3 milliGRAM(s) Oral at bedtime PRN Insomnia  ondansetron Injectable 4 milliGRAM(s) IV Push every 8 hours PRN Nausea and/or Vomiting      REVIEW OF SYSTEMS:    RESPIRATORY: No shortness of breath  CARDIOVASCULAR: No chest pain  All other review of systems is negative unless indicated above.    Vital Signs Last 24 Hrs  T(C): 36.8 (01 Jul 2022 09:10), Max: 36.8 (30 Jun 2022 20:57)  T(F): 98.2 (01 Jul 2022 09:10), Max: 98.2 (30 Jun 2022 20:57)  HR: 59 (01 Jul 2022 09:10) (57 - 70)  BP: 107/66 (01 Jul 2022 09:10) (102/64 - 107/66)  BP(mean): --  RR: 16 (01 Jul 2022 09:10) (16 - 20)  SpO2: 95% (01 Jul 2022 09:10) (92% - 98%)    PHYSICAL EXAM:    Constitutional: NAD, well-developed  Respiratory: CTAB  Cardiovascular: S1 and S2, RRR  Gastrointestinal: BS+, soft, NT/ND  Extremities: No peripheral edema  Psychiatric: Normal mood, normal affect    LABS:                        12.4   23.91 )-----------( 213      ( 01 Jul 2022 06:43 )             36.7     07-01    141  |  109<H>  |  20  ----------------------------<  94  3.8   |  28  |  1.01    Ca    9.1      01 Jul 2022 06:43  Phos  2.6     07-01  Mg     2.1     07-01    TPro  5.5<L>  /  Alb  3.0<L>  /  TBili  0.2  /  DBili  0.1  /  AST  19  /  ALT  21  /  AlkPhos  74  07-01      LIVER FUNCTIONS - ( 01 Jul 2022 06:43 )  Alb: 3.0 g/dL / Pro: 5.5 gm/dL / ALK PHOS: 74 U/L / ALT: 21 U/L / AST: 19 U/L / GGT: x             RADIOLOGY & ADDITIONAL STUDIES:

## 2022-07-01 NOTE — DISCHARGE NOTE PROVIDER - NSDCCPCAREPLAN_GEN_ALL_CORE_FT
PRINCIPAL DISCHARGE DIAGNOSIS  Diagnosis: Abdominal pain  Assessment and Plan of Treatment: Due to constipation  - You had an upper endoscopy that was normal and an ultrasound of your gallbladder that was normal  - Follow up with Dr. Ng GI in one week  - Follow up with your PCP in one week to go over the details of your hospitalization  - Take Miralax and Dulcolax over the counter as needed for constipation  - If you develop fever, chills, nausea or vomiting return to the ER      SECONDARY DISCHARGE DIAGNOSES  Diagnosis: COPD exacerbation  Assessment and Plan of Treatment: - Albuterol, Symbicort and Spiriva inhalers as directed   - Continue steroids as directed for one more day   - Follow up with Dr. Palacio      Diagnosis: CLL (chronic lymphocytic leukemia)  Assessment and Plan of Treatment: - Follow up with Dr. Castaneda    Diagnosis: Hypothyroidism  Assessment and Plan of Treatment: - Your thyroid function testes were low  - Start taking Levothyroxine 137mcg daily (increased dose) and follow up with your PCP to have routing thyroid function monitoring    Diagnosis: Lumbar herniated disc  Assessment and Plan of Treatment: - Tylenol for pain   - Continue Cymbalta    Diagnosis: Substance abuse  Assessment and Plan of Treatment: Attend NA/AA meetings, refrain from nonprescription drugs (crack/cocaine or opiates)    Diagnosis: Nicotine dependence  Assessment and Plan of Treatment: Stop smoking   Continue to use nicotine patches as directed

## 2022-07-01 NOTE — DISCHARGE NOTE NURSING/CASE MANAGEMENT/SOCIAL WORK - NSDCPEEMAIL_GEN_ALL_CORE
[facility] for Tobacco Control email [email or web address]
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
## 2022-07-01 NOTE — PROGRESS NOTE ADULT - SUBJECTIVE AND OBJECTIVE BOX
Hospital Day#: 3    The patient is doing well with improvement of his abdominal pain & distention, currently having flatus and small BM today.     Vital Signs Last 24 Hrs  T(C): 36.8 (01 Jul 2022 09:10), Max: 36.8 (30 Jun 2022 20:57)  T(F): 98.2 (01 Jul 2022 09:10), Max: 98.2 (30 Jun 2022 20:57)  HR: 59 (01 Jul 2022 09:10) (57 - 70)  BP: 107/66 (01 Jul 2022 09:10) (102/64 - 107/66)  BP(mean): --  RR: 16 (01 Jul 2022 09:10) (16 - 20)  SpO2: 95% (01 Jul 2022 09:10) (92% - 98%)    PHYSICAL EXAM:  General: NAD.  HEENT: no JVD, no jaundice.  LUNGS: CTAB.  Heart: S1 S2 RRR  Abd: soft nt/nd                             12.4   23.91 )-----------( 213      ( 01 Jul 2022 06:43 )             36.7       07-01    141  |  109<H>  |  20  ----------------------------<  94  3.8   |  28  |  1.01    Ca    9.1      01 Jul 2022 06:43  Phos  2.6     07-01  Mg     2.1     07-01    TPro  5.5<L>  /  Alb  3.0<L>  /  TBili  0.2  /  DBili  0.1  /  AST  19  /  ALT  21  /  AlkPhos  74  07-01

## 2022-07-01 NOTE — PROGRESS NOTE ADULT - PROBLEM SELECTOR PLAN 1
- Patient improving and tolerating regular diet  - Serial abdominal exams  - No surgical intervention needed, signing off

## 2022-07-01 NOTE — DISCHARGE NOTE PROVIDER - DETAILS OF MALNUTRITION DIAGNOSIS/DIAGNOSES
This patient has been assessed with a concern for Malnutrition and was treated during this hospitalization for the following Nutrition diagnosis/diagnoses:     -  06/30/2022: Severe protein-calorie malnutrition   -  06/30/2022: Underweight (BMI < 19)

## 2022-07-01 NOTE — DISCHARGE NOTE PROVIDER - CARE PROVIDERS DIRECT ADDRESSES
,DirectAddress_Unknown,[email or web address],DirectAddress_Unknown

## 2022-07-01 NOTE — DISCHARGE NOTE PROVIDER - PROVIDER TOKENS
PROVIDER:[TOKEN:[[id number]],FOLLOWUP:[1 week]],PROVIDER:[TOKEN:[[id number]],FOLLOWUP:[1 week]],PROVIDER:[TOKEN:[[id number]],FOLLOWUP:[1 week]],FREE:[LAST:[Eddy],FIRST:[June],PHONE:[(   )    -],FAX:[(   )    -],FOLLOWUP:[1 week]]

## 2022-07-08 DIAGNOSIS — Z66 DO NOT RESUSCITATE: ICD-10-CM

## 2022-07-08 DIAGNOSIS — Z86.74 PERSONAL HISTORY OF SUDDEN CARDIAC ARREST: ICD-10-CM

## 2022-07-08 DIAGNOSIS — R33.9 RETENTION OF URINE, UNSPECIFIED: ICD-10-CM

## 2022-07-08 DIAGNOSIS — D45 POLYCYTHEMIA VERA: ICD-10-CM

## 2022-07-08 DIAGNOSIS — E03.9 HYPOTHYROIDISM, UNSPECIFIED: ICD-10-CM

## 2022-07-08 DIAGNOSIS — K59.00 CONSTIPATION, UNSPECIFIED: ICD-10-CM

## 2022-07-08 DIAGNOSIS — M51.26 OTHER INTERVERTEBRAL DISC DISPLACEMENT, LUMBAR REGION: ICD-10-CM

## 2022-07-08 DIAGNOSIS — N17.9 ACUTE KIDNEY FAILURE, UNSPECIFIED: ICD-10-CM

## 2022-07-08 DIAGNOSIS — K44.9 DIAPHRAGMATIC HERNIA WITHOUT OBSTRUCTION OR GANGRENE: ICD-10-CM

## 2022-07-08 DIAGNOSIS — F17.200 NICOTINE DEPENDENCE, UNSPECIFIED, UNCOMPLICATED: ICD-10-CM

## 2022-07-08 DIAGNOSIS — K56.7 ILEUS, UNSPECIFIED: ICD-10-CM

## 2022-07-08 DIAGNOSIS — E43 UNSPECIFIED SEVERE PROTEIN-CALORIE MALNUTRITION: ICD-10-CM

## 2022-07-08 DIAGNOSIS — J44.1 CHRONIC OBSTRUCTIVE PULMONARY DISEASE WITH (ACUTE) EXACERBATION: ICD-10-CM

## 2022-07-08 DIAGNOSIS — C91.10 CHRONIC LYMPHOCYTIC LEUKEMIA OF B-CELL TYPE NOT HAVING ACHIEVED REMISSION: ICD-10-CM

## 2022-07-08 DIAGNOSIS — Z79.51 LONG TERM (CURRENT) USE OF INHALED STEROIDS: ICD-10-CM

## 2022-09-22 ENCOUNTER — EMERGENCY (EMERGENCY)
Facility: HOSPITAL | Age: 62
LOS: 0 days | Discharge: LEFT BEFORE DISCHARGE | End: 2022-09-22
Attending: EMERGENCY MEDICINE
Payer: MEDICAID

## 2022-09-22 VITALS
WEIGHT: 100.09 LBS | HEART RATE: 65 BPM | HEIGHT: 63 IN | DIASTOLIC BLOOD PRESSURE: 101 MMHG | RESPIRATION RATE: 16 BRPM | SYSTOLIC BLOOD PRESSURE: 147 MMHG | OXYGEN SATURATION: 99 % | TEMPERATURE: 97 F

## 2022-09-22 VITALS
SYSTOLIC BLOOD PRESSURE: 124 MMHG | RESPIRATION RATE: 12 BRPM | DIASTOLIC BLOOD PRESSURE: 84 MMHG | OXYGEN SATURATION: 99 % | HEART RATE: 56 BPM

## 2022-09-22 DIAGNOSIS — Z98.890 OTHER SPECIFIED POSTPROCEDURAL STATES: Chronic | ICD-10-CM

## 2022-09-22 LAB
ALBUMIN SERPL ELPH-MCNC: 3.9 G/DL — SIGNIFICANT CHANGE UP (ref 3.3–5)
ALP SERPL-CCNC: 84 U/L — SIGNIFICANT CHANGE UP (ref 40–120)
ALT FLD-CCNC: 31 U/L — SIGNIFICANT CHANGE UP (ref 12–78)
ANION GAP SERPL CALC-SCNC: 4 MMOL/L — LOW (ref 5–17)
AST SERPL-CCNC: 36 U/L — SIGNIFICANT CHANGE UP (ref 15–37)
BASOPHILS # BLD AUTO: 0 K/UL — SIGNIFICANT CHANGE UP (ref 0–0.2)
BASOPHILS NFR BLD AUTO: 0 % — SIGNIFICANT CHANGE UP (ref 0–2)
BILIRUB SERPL-MCNC: 0.3 MG/DL — SIGNIFICANT CHANGE UP (ref 0.2–1.2)
BUN SERPL-MCNC: 24 MG/DL — HIGH (ref 7–23)
CALCIUM SERPL-MCNC: 9.4 MG/DL — SIGNIFICANT CHANGE UP (ref 8.5–10.1)
CHLORIDE SERPL-SCNC: 102 MMOL/L — SIGNIFICANT CHANGE UP (ref 96–108)
CO2 SERPL-SCNC: 30 MMOL/L — SIGNIFICANT CHANGE UP (ref 22–31)
CREAT SERPL-MCNC: 1.17 MG/DL — SIGNIFICANT CHANGE UP (ref 0.5–1.3)
EGFR: 71 ML/MIN/1.73M2 — SIGNIFICANT CHANGE UP
EOSINOPHIL # BLD AUTO: 0.15 K/UL — SIGNIFICANT CHANGE UP (ref 0–0.5)
EOSINOPHIL NFR BLD AUTO: 1 % — SIGNIFICANT CHANGE UP (ref 0–6)
GLUCOSE SERPL-MCNC: 124 MG/DL — HIGH (ref 70–99)
HCT VFR BLD CALC: 42.4 % — SIGNIFICANT CHANGE UP (ref 39–50)
HGB BLD-MCNC: 14.4 G/DL — SIGNIFICANT CHANGE UP (ref 13–17)
LYMPHOCYTES # BLD AUTO: 51 % — HIGH (ref 13–44)
LYMPHOCYTES # BLD AUTO: 7.44 K/UL — HIGH (ref 1–3.3)
MAGNESIUM SERPL-MCNC: 2.3 MG/DL — SIGNIFICANT CHANGE UP (ref 1.6–2.6)
MCHC RBC-ENTMCNC: 34 GM/DL — SIGNIFICANT CHANGE UP (ref 32–36)
MCHC RBC-ENTMCNC: 35 PG — HIGH (ref 27–34)
MCV RBC AUTO: 103.2 FL — HIGH (ref 80–100)
MONOCYTES # BLD AUTO: 0.15 K/UL — SIGNIFICANT CHANGE UP (ref 0–0.9)
MONOCYTES NFR BLD AUTO: 1 % — LOW (ref 2–14)
NEUTROPHILS # BLD AUTO: 5.83 K/UL — SIGNIFICANT CHANGE UP (ref 1.8–7.4)
NEUTROPHILS NFR BLD AUTO: 40 % — LOW (ref 43–77)
NRBC # BLD: SIGNIFICANT CHANGE UP /100 WBCS (ref 0–0)
PLATELET # BLD AUTO: 225 K/UL — SIGNIFICANT CHANGE UP (ref 150–400)
POTASSIUM SERPL-MCNC: 3.4 MMOL/L — LOW (ref 3.5–5.3)
POTASSIUM SERPL-SCNC: 3.4 MMOL/L — LOW (ref 3.5–5.3)
PROT SERPL-MCNC: 6.8 GM/DL — SIGNIFICANT CHANGE UP (ref 6–8.3)
RBC # BLD: 4.11 M/UL — LOW (ref 4.2–5.8)
RBC # FLD: 12.9 % — SIGNIFICANT CHANGE UP (ref 10.3–14.5)
SODIUM SERPL-SCNC: 136 MMOL/L — SIGNIFICANT CHANGE UP (ref 135–145)
TROPONIN I, HIGH SENSITIVITY RESULT: 3.97 NG/L — SIGNIFICANT CHANGE UP
WBC # BLD: 14.58 K/UL — HIGH (ref 3.8–10.5)
WBC # FLD AUTO: 14.58 K/UL — HIGH (ref 3.8–10.5)

## 2022-09-22 PROCEDURE — 84484 ASSAY OF TROPONIN QUANT: CPT

## 2022-09-22 PROCEDURE — U0005: CPT

## 2022-09-22 PROCEDURE — 85025 COMPLETE CBC W/AUTO DIFF WBC: CPT

## 2022-09-22 PROCEDURE — 93005 ELECTROCARDIOGRAM TRACING: CPT

## 2022-09-22 PROCEDURE — U0003: CPT

## 2022-09-22 PROCEDURE — L9991: CPT

## 2022-09-22 PROCEDURE — 80053 COMPREHEN METABOLIC PANEL: CPT

## 2022-09-22 PROCEDURE — 83735 ASSAY OF MAGNESIUM: CPT

## 2022-09-22 PROCEDURE — 93010 ELECTROCARDIOGRAM REPORT: CPT

## 2022-09-22 PROCEDURE — 36415 COLL VENOUS BLD VENIPUNCTURE: CPT

## 2022-09-22 RX ORDER — SODIUM CHLORIDE 9 MG/ML
1000 INJECTION INTRAMUSCULAR; INTRAVENOUS; SUBCUTANEOUS ONCE
Refills: 0 | Status: COMPLETED | OUTPATIENT
Start: 2022-09-22 | End: 2022-09-22

## 2022-09-22 NOTE — PROVIDER CONTACT NOTE (OTHER) - SITUATION
Pt eloped from ER. Both IV removed from patient before patient left the ER.
Pt states he is going to leave ER and denies wanting IV fluids
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
## 2022-09-22 NOTE — ED ADULT NURSE NOTE - OBJECTIVE STATEMENT
Pt is A&O x 2, denies pain and SOB, Pt states he lives with his brother and his brother found him in the basement, Pt states he was sitting down when he passed out. V/S/S at this time. Tele monitor and continuous pulse ox. in place. Sinus Bradycardia present on tele monitor. Pt denies alcohol and drug use. call bell in reach. Urinal at bedside.

## 2022-09-23 DIAGNOSIS — R55 SYNCOPE AND COLLAPSE: ICD-10-CM

## 2022-09-23 DIAGNOSIS — Z53.21 PROCEDURE AND TREATMENT NOT CARRIED OUT DUE TO PATIENT LEAVING PRIOR TO BEING SEEN BY HEALTH CARE PROVIDER: ICD-10-CM

## 2022-09-23 PROBLEM — Z86.19 PERSONAL HISTORY OF OTHER INFECTIOUS AND PARASITIC DISEASES: Chronic | Status: ACTIVE | Noted: 2022-06-29

## 2022-09-23 PROBLEM — C91.10 CHRONIC LYMPHOCYTIC LEUKEMIA OF B-CELL TYPE NOT HAVING ACHIEVED REMISSION: Chronic | Status: ACTIVE | Noted: 2022-06-28

## 2022-09-23 PROBLEM — J44.9 CHRONIC OBSTRUCTIVE PULMONARY DISEASE, UNSPECIFIED: Chronic | Status: ACTIVE | Noted: 2022-06-28

## 2022-09-23 PROBLEM — Z87.39 PERSONAL HISTORY OF OTHER DISEASES OF THE MUSCULOSKELETAL SYSTEM AND CONNECTIVE TISSUE: Chronic | Status: ACTIVE | Noted: 2022-06-29

## 2022-09-23 PROBLEM — K44.9 DIAPHRAGMATIC HERNIA WITHOUT OBSTRUCTION OR GANGRENE: Chronic | Status: ACTIVE | Noted: 2022-06-29

## 2022-09-23 PROBLEM — E03.9 HYPOTHYROIDISM, UNSPECIFIED: Chronic | Status: ACTIVE | Noted: 2022-06-29

## 2022-12-28 ENCOUNTER — APPOINTMENT (OUTPATIENT)
Dept: PHYSICAL MEDICINE AND REHAB | Facility: CLINIC | Age: 62
End: 2022-12-28

## 2022-12-28 DIAGNOSIS — M25.512 PAIN IN LEFT SHOULDER: ICD-10-CM

## 2022-12-28 DIAGNOSIS — M47.816 SPONDYLOSIS W/OUT MYELOPATHY OR RADICULOPATHY, LUMBAR REGION: ICD-10-CM

## 2022-12-28 DIAGNOSIS — G89.29 PAIN IN LEFT SHOULDER: ICD-10-CM

## 2022-12-28 DIAGNOSIS — M25.551 PAIN IN RIGHT HIP: ICD-10-CM

## 2022-12-28 PROCEDURE — 99214 OFFICE O/P EST MOD 30 MIN: CPT

## 2022-12-28 RX ORDER — MELOXICAM 7.5 MG/1
7.5 TABLET ORAL
Qty: 30 | Refills: 1 | Status: ACTIVE | COMMUNITY
Start: 2022-12-28 | End: 1900-01-01

## 2022-12-28 NOTE — PHYSICAL EXAM
[FreeTextEntry1] : NAD\par A&Ox3\par Non-obese\par ROM Shoulders: 90' LEFT ABD before pain (improved); 120' RIGHT ABD/FF w/ pain (improved)\par Neer's: + b/l\par Hawkin's: + b/l\par Drop Arm: neg\par Scarf: deferred\par RC MMT: 4/5 pain-related weakness SS; 5-/5 B/L IS\par Palpation: TTP B/L distal SS tendon insertion sites\par ROM L-spine: 3/4 full forward flexion before pain; 10' passive extension before pain\par ROM Hips: relatively smooth IR/ER w/ pain R hip endROM\par Pelvic tilt: ++\par Seated slump test: neg\par SLR: neg b/l\par RAJESH's: neg b/l\par DTR's: 2+ knees/ankles\par MMT: 5/5 b/l DF/PF; 4+/5 pain-related weakness HFs b/l\par Sensation: SILT\par Toe & Heel Walk: Yes \par \par

## 2022-12-28 NOTE — DATA REVIEWED
[MRI] : MRI [FreeTextEntry1] : MSK US EXAMINATION LEFT SHOULDER (10/8/20)\par \par ANTERIOR SHOULDER\par \par LONG HEAD BICEPS TENDON IN BICIPITAL GROOVE - longitudinal and split tear; increased Doppler flow\par SUBSCAPULARIS TENDON - partial thickness longitudinal tear myotendinous junction\par CONJOINT TENDON OF CORACOBRACHIALIS AND SHBT - intact\par \par \par LATERAL SHOULDER\par \par ROTATOR INTERVAL (TRANSVERSE AXIS)\par  - SGHL - difficult to visualize\par  - CHL - difficult to visualize\par  - Supraspinatus tendon\par  - Subscapularis tendon\par \par SUPRASPINATUS TENDON \par \par LONG AXIS\par \par  - SUBACROMIAL/SUBDELTOID BURSA - minimal fluid; no distention\par  - DISTAL FOOTPRINT - tendinopathic changes w/o retraction\par  - BURSAL SURFACE TEAR - large partial thickness tear w/ cortical irregularities\par  - ARTICULAR SURFACE TEAR - \par  - INTRASUBSTSANCE TEAR - small tear\par  \par TRANSVERSE AXIS\par \par  - BURSAL SURFACE TEAR\par  - ARTICULAR SURFACE TEAR - high grade partial thickness tear\par  - INTRASUBSTANCE TEAR \par \par \par MRI R Shoulder (April 2019): SS tendinosis w/ low-grade tearing; LHBT tendinosis; tear of inferior glenoid labrum w/ associated small paralabral cyst.\par \par MRI L Shoulder (April 2019): SS tendinosis w/ mild fraying; subscapularis tendinosis; RC tendinopathy somewhat progressed since Jan 2018; DJD GH & AC joints; LHBT tendinosis and low-grade tearing; small non-displaced superior labral tear (new since Jan 2018).\par \par MRI C-spine (Oct. 2018): multilevel DDD w/ moderate narrowing spinal canal at C5-6 and mild narrowing at C6-7; varying degrees of NF stenosis, worse at C5-6.

## 2022-12-28 NOTE — HISTORY OF PRESENT ILLNESS
[FreeTextEntry1] : 62 y.o. M w/ h/o COPD, Graves disease, CLL, chronic Lyme disease/syndrome, cervical DDD/spondylosis and b/l RC tendinopathy/tears returns to office for f/u.  Pt. states that he has not been able to receive medical care consistently 2' insurance issues.  He still c/o b/l shoulder pain but did not go for Ortho consultation.  He is also c/o LBP and right hip pain.  Endorses occasional pain down legs.  No significant N/T/B in feet.  No recent P.T.  Not taking NSAIDs or pain medications.

## 2022-12-28 NOTE — ASSESSMENT
[FreeTextEntry1] : 62 y.o. M w/ h/o COPD, Graves disease, CLL, chronic Lyme disease/syndrome, cervical DDD/spondylosis and b/l RC tendinopathy/tears.  I spent most of today's office visit (25 minutes) reviewing the patient's prior imaging, discussing etiology, pathogenesis, further diagnostic work-up and nonoperative management.  Given the patient's smoking history and active blood cancer, he requires x-rays of his lumbar spine and right hip.  I have given him another referral to orthopedics to further address his left shoulder pain.  Discussed risks, benefits and alternatives to low-dose meloxicam 7.5 mg; 1 tab p.o. daily x1 to 2 weeks as tolerated.  Narcotic analgesics are relatively contraindicated in a patient with active COPD and capitals.  I explained that the patient is not good candidate for repeat CSI or regenerative medicine procedure.  If the patient's radiographs are negative for lytic or blastic disease, we may release him to the care of a physical therapist for his lower back and right hip pain.  I strongly encouraged him to follow-up with his hematologist and to discontinue smoking.   The patient is in agreement with the plan.  All questions have been answered.  Return to clinic after the above.

## 2023-01-10 ENCOUNTER — APPOINTMENT (OUTPATIENT)
Dept: ORTHOPEDIC SURGERY | Facility: CLINIC | Age: 63
End: 2023-01-10

## 2023-08-13 NOTE — PROVIDER CONTACT NOTE (OTHER) - ACTION/TREATMENT ORDERED:
Continuous bladder scan and monitoring
No action/treatment provided by Maris (Hospitalist Medicine PA) at this time.
Stable

## 2023-10-01 PROBLEM — Z92.3 HISTORY OF RADIATION THERAPY: Status: RESOLVED | Noted: 2020-10-02 | Resolved: 2023-10-01

## 2024-05-20 ENCOUNTER — APPOINTMENT (OUTPATIENT)
Dept: PHYSICAL MEDICINE AND REHAB | Facility: CLINIC | Age: 64
End: 2024-05-20

## 2024-05-20 NOTE — ASSESSMENT
[FreeTextEntry1] : 64 y.o. M w/ h/o COPD, Graves disease, CLL, chronic Lyme disease/syndrome, cervical DDD/spondylosis and b/l RC tendinopathy/tears.  I spent most of today's office visit (25 minutes) reviewing the patient's prior imaging, discussing etiology, pathogenesis, further diagnostic work-up and nonoperative management.  Given the patient's smoking history and active blood cancer, he requires x-rays of his lumbar spine and right hip.  I have given him another referral to orthopedics to further address his left shoulder pain.  Discussed risks, benefits and alternatives to low-dose meloxicam 7.5 mg; 1 tab p.o. daily x1 to 2 weeks as tolerated.  Narcotic analgesics are relatively contraindicated in a patient with active COPD and capitals.  I explained that the patient is not good candidate for repeat CSI or regenerative medicine procedure.  If the patient's radiographs are negative for lytic or blastic disease, we may release him to the care of a physical therapist for his lower back and right hip pain.  I strongly encouraged him to follow-up with his hematologist and to discontinue smoking.   The patient is in agreement with the plan.  All questions have been answered.  Return to clinic after the above.

## 2024-05-20 NOTE — HISTORY OF PRESENT ILLNESS
[FreeTextEntry1] : 64 y.o. M w/ h/o COPD, Graves disease, CLL, chronic Lyme disease/syndrome, cervical DDD/spondylosis and b/l RC tendinopathy/tears returns to office for follow up.  Pt. last seen December 2022.

## 2024-05-23 ENCOUNTER — APPOINTMENT (OUTPATIENT)
Dept: PHYSICAL MEDICINE AND REHAB | Facility: CLINIC | Age: 64
End: 2024-05-23

## 2024-10-13 ENCOUNTER — INPATIENT (INPATIENT)
Facility: HOSPITAL | Age: 64
LOS: 10 days | Discharge: NURSING, REHAB OR LONG-TERM CARE | DRG: 501 | End: 2024-10-24
Attending: HOSPITALIST | Admitting: INTERNAL MEDICINE
Payer: MEDICAID

## 2024-10-13 VITALS
OXYGEN SATURATION: 100 % | TEMPERATURE: 99 F | DIASTOLIC BLOOD PRESSURE: 98 MMHG | HEART RATE: 78 BPM | SYSTOLIC BLOOD PRESSURE: 114 MMHG | RESPIRATION RATE: 19 BRPM

## 2024-10-13 DIAGNOSIS — N49.2 INFLAMMATORY DISORDERS OF SCROTUM: ICD-10-CM

## 2024-10-13 DIAGNOSIS — Z98.890 OTHER SPECIFIED POSTPROCEDURAL STATES: Chronic | ICD-10-CM

## 2024-10-13 LAB
ABO RH CONFIRMATION: SIGNIFICANT CHANGE UP
ADD ON TEST-SPECIMEN IN LAB: SIGNIFICANT CHANGE UP
ALBUMIN SERPL ELPH-MCNC: 3 G/DL — LOW (ref 3.3–5)
ALBUMIN SERPL ELPH-MCNC: 3 G/DL — LOW (ref 3.3–5)
ALP SERPL-CCNC: 102 U/L — SIGNIFICANT CHANGE UP (ref 40–120)
ALP SERPL-CCNC: 83 U/L — SIGNIFICANT CHANGE UP (ref 40–120)
ALT FLD-CCNC: 37 U/L — SIGNIFICANT CHANGE UP (ref 12–78)
ALT FLD-CCNC: 44 U/L — SIGNIFICANT CHANGE UP (ref 12–78)
ANION GAP SERPL CALC-SCNC: 5 MMOL/L — SIGNIFICANT CHANGE UP (ref 5–17)
ANION GAP SERPL CALC-SCNC: 7 MMOL/L — SIGNIFICANT CHANGE UP (ref 5–17)
ANISOCYTOSIS BLD QL: SLIGHT — SIGNIFICANT CHANGE UP
APPEARANCE UR: CLEAR — SIGNIFICANT CHANGE UP
APTT BLD: 33.4 SEC — SIGNIFICANT CHANGE UP (ref 24.5–35.6)
APTT BLD: 34.3 SEC — SIGNIFICANT CHANGE UP (ref 24.5–35.6)
AST SERPL-CCNC: 38 U/L — HIGH (ref 15–37)
AST SERPL-CCNC: 46 U/L — HIGH (ref 15–37)
BASOPHILS # BLD AUTO: 0 K/UL — SIGNIFICANT CHANGE UP (ref 0–0.2)
BASOPHILS # BLD AUTO: 0.09 K/UL — SIGNIFICANT CHANGE UP (ref 0–0.2)
BASOPHILS NFR BLD AUTO: 0 % — SIGNIFICANT CHANGE UP (ref 0–2)
BASOPHILS NFR BLD AUTO: 0.3 % — SIGNIFICANT CHANGE UP (ref 0–2)
BILIRUB SERPL-MCNC: 0.2 MG/DL — SIGNIFICANT CHANGE UP (ref 0.2–1.2)
BILIRUB SERPL-MCNC: 0.2 MG/DL — SIGNIFICANT CHANGE UP (ref 0.2–1.2)
BILIRUB UR-MCNC: NEGATIVE — SIGNIFICANT CHANGE UP
BLD GP AB SCN SERPL QL: SIGNIFICANT CHANGE UP
BUN SERPL-MCNC: 13 MG/DL — SIGNIFICANT CHANGE UP (ref 7–23)
BUN SERPL-MCNC: 15 MG/DL — SIGNIFICANT CHANGE UP (ref 7–23)
CALCIUM SERPL-MCNC: 8.4 MG/DL — LOW (ref 8.5–10.1)
CALCIUM SERPL-MCNC: 8.5 MG/DL — SIGNIFICANT CHANGE UP (ref 8.5–10.1)
CHLORIDE SERPL-SCNC: 107 MMOL/L — SIGNIFICANT CHANGE UP (ref 96–108)
CHLORIDE SERPL-SCNC: 109 MMOL/L — HIGH (ref 96–108)
CO2 SERPL-SCNC: 22 MMOL/L — SIGNIFICANT CHANGE UP (ref 22–31)
CO2 SERPL-SCNC: 24 MMOL/L — SIGNIFICANT CHANGE UP (ref 22–31)
COLOR SPEC: YELLOW — SIGNIFICANT CHANGE UP
CREAT SERPL-MCNC: 0.67 MG/DL — SIGNIFICANT CHANGE UP (ref 0.5–1.3)
CREAT SERPL-MCNC: 0.72 MG/DL — SIGNIFICANT CHANGE UP (ref 0.5–1.3)
DIFF PNL FLD: NEGATIVE — SIGNIFICANT CHANGE UP
EGFR: 103 ML/MIN/1.73M2 — SIGNIFICANT CHANGE UP
EGFR: 105 ML/MIN/1.73M2 — SIGNIFICANT CHANGE UP
EOSINOPHIL # BLD AUTO: 0 K/UL — SIGNIFICANT CHANGE UP (ref 0–0.5)
EOSINOPHIL # BLD AUTO: 0.04 K/UL — SIGNIFICANT CHANGE UP (ref 0–0.5)
EOSINOPHIL NFR BLD AUTO: 0 % — SIGNIFICANT CHANGE UP (ref 0–6)
EOSINOPHIL NFR BLD AUTO: 0.1 % — SIGNIFICANT CHANGE UP (ref 0–6)
GLUCOSE SERPL-MCNC: 105 MG/DL — HIGH (ref 70–99)
GLUCOSE SERPL-MCNC: 85 MG/DL — SIGNIFICANT CHANGE UP (ref 70–99)
GLUCOSE UR QL: NEGATIVE MG/DL — SIGNIFICANT CHANGE UP
HCT VFR BLD CALC: 31.9 % — LOW (ref 39–50)
HCT VFR BLD CALC: 32.2 % — LOW (ref 39–50)
HGB BLD-MCNC: 10.7 G/DL — LOW (ref 13–17)
HGB BLD-MCNC: 11 G/DL — LOW (ref 13–17)
IMM GRANULOCYTES NFR BLD AUTO: 0.5 % — SIGNIFICANT CHANGE UP (ref 0–0.9)
INR BLD: 0.98 RATIO — SIGNIFICANT CHANGE UP (ref 0.85–1.16)
INR BLD: 1.01 RATIO — SIGNIFICANT CHANGE UP (ref 0.85–1.16)
KETONES UR-MCNC: NEGATIVE MG/DL — SIGNIFICANT CHANGE UP
LACTATE SERPL-SCNC: 0.9 MMOL/L — SIGNIFICANT CHANGE UP (ref 0.7–2)
LDH SERPL L TO P-CCNC: 272 U/L — HIGH (ref 84–241)
LEUKOCYTE ESTERASE UR-ACNC: NEGATIVE — SIGNIFICANT CHANGE UP
LYMPHOCYTES # BLD AUTO: 18.04 K/UL — HIGH (ref 1–3.3)
LYMPHOCYTES # BLD AUTO: 21.03 K/UL — HIGH (ref 1–3.3)
LYMPHOCYTES # BLD AUTO: 65 % — HIGH (ref 13–44)
LYMPHOCYTES # BLD AUTO: 71.2 % — HIGH (ref 13–44)
MACROCYTES BLD QL: SLIGHT — SIGNIFICANT CHANGE UP
MANUAL SMEAR VERIFICATION: SIGNIFICANT CHANGE UP
MANUAL SMEAR VERIFICATION: SIGNIFICANT CHANGE UP
MCHC RBC-ENTMCNC: 33.5 GM/DL — SIGNIFICANT CHANGE UP (ref 32–36)
MCHC RBC-ENTMCNC: 34.2 GM/DL — SIGNIFICANT CHANGE UP (ref 32–36)
MCHC RBC-ENTMCNC: 37.3 PG — HIGH (ref 27–34)
MCHC RBC-ENTMCNC: 37.4 PG — HIGH (ref 27–34)
MCV RBC AUTO: 109.5 FL — HIGH (ref 80–100)
MCV RBC AUTO: 111.1 FL — HIGH (ref 80–100)
MONOCYTES # BLD AUTO: 0.19 K/UL — SIGNIFICANT CHANGE UP (ref 0–0.9)
MONOCYTES # BLD AUTO: 0.28 K/UL — SIGNIFICANT CHANGE UP (ref 0–0.9)
MONOCYTES NFR BLD AUTO: 0.6 % — LOW (ref 2–14)
MONOCYTES NFR BLD AUTO: 1 % — LOW (ref 2–14)
NEUTROPHILS # BLD AUTO: 8.03 K/UL — HIGH (ref 1.8–7.4)
NEUTROPHILS # BLD AUTO: 9.44 K/UL — HIGH (ref 1.8–7.4)
NEUTROPHILS NFR BLD AUTO: 27.3 % — LOW (ref 43–77)
NEUTROPHILS NFR BLD AUTO: 34 % — LOW (ref 43–77)
NITRITE UR-MCNC: NEGATIVE — SIGNIFICANT CHANGE UP
NRBC # BLD: 0 /100 WBCS — SIGNIFICANT CHANGE UP (ref 0–0)
NRBC # BLD: SIGNIFICANT CHANGE UP /100 WBCS (ref 0–0)
PH UR: 6.5 — SIGNIFICANT CHANGE UP (ref 5–8)
PLAT MORPH BLD: NORMAL — SIGNIFICANT CHANGE UP
PLAT MORPH BLD: SIGNIFICANT CHANGE UP
PLATELET # BLD AUTO: 204 K/UL — SIGNIFICANT CHANGE UP (ref 150–400)
PLATELET # BLD AUTO: 206 K/UL — SIGNIFICANT CHANGE UP (ref 150–400)
POIKILOCYTOSIS BLD QL AUTO: SLIGHT — SIGNIFICANT CHANGE UP
POLYCHROMASIA BLD QL SMEAR: SLIGHT — SIGNIFICANT CHANGE UP
POTASSIUM SERPL-MCNC: 3.7 MMOL/L — SIGNIFICANT CHANGE UP (ref 3.5–5.3)
POTASSIUM SERPL-MCNC: 3.9 MMOL/L — SIGNIFICANT CHANGE UP (ref 3.5–5.3)
POTASSIUM SERPL-SCNC: 3.7 MMOL/L — SIGNIFICANT CHANGE UP (ref 3.5–5.3)
POTASSIUM SERPL-SCNC: 3.9 MMOL/L — SIGNIFICANT CHANGE UP (ref 3.5–5.3)
PROT SERPL-MCNC: 6.6 GM/DL — SIGNIFICANT CHANGE UP (ref 6–8.3)
PROT SERPL-MCNC: 6.8 GM/DL — SIGNIFICANT CHANGE UP (ref 6–8.3)
PROT UR-MCNC: NEGATIVE MG/DL — SIGNIFICANT CHANGE UP
PROTHROM AB SERPL-ACNC: 11.6 SEC — SIGNIFICANT CHANGE UP (ref 9.9–13.4)
PROTHROM AB SERPL-ACNC: 11.6 SEC — SIGNIFICANT CHANGE UP (ref 9.9–13.4)
RBC # BLD: 2.87 M/UL — LOW (ref 4.2–5.8)
RBC # BLD: 2.94 M/UL — LOW (ref 4.2–5.8)
RBC # FLD: 13.5 % — SIGNIFICANT CHANGE UP (ref 10.3–14.5)
RBC # FLD: 13.5 % — SIGNIFICANT CHANGE UP (ref 10.3–14.5)
RBC BLD AUTO: ABNORMAL
RBC BLD AUTO: SIGNIFICANT CHANGE UP
RETICS #: 35.4 K/UL — SIGNIFICANT CHANGE UP (ref 25–125)
RETICS/RBC NFR: 1.2 % — SIGNIFICANT CHANGE UP (ref 0.5–2.5)
SMUDGE CELLS # BLD: PRESENT — SIGNIFICANT CHANGE UP
SODIUM SERPL-SCNC: 136 MMOL/L — SIGNIFICANT CHANGE UP (ref 135–145)
SODIUM SERPL-SCNC: 138 MMOL/L — SIGNIFICANT CHANGE UP (ref 135–145)
SP GR SPEC: >1.03 — HIGH (ref 1–1.03)
TSH SERPL-MCNC: 81.3 UU/ML — HIGH (ref 0.34–4.82)
UROBILINOGEN FLD QL: 0.2 MG/DL — SIGNIFICANT CHANGE UP (ref 0.2–1)
WBC # BLD: 27.76 K/UL — HIGH (ref 3.8–10.5)
WBC # BLD: 29.52 K/UL — HIGH (ref 3.8–10.5)
WBC # FLD AUTO: 27.76 K/UL — HIGH (ref 3.8–10.5)
WBC # FLD AUTO: 29.52 K/UL — HIGH (ref 3.8–10.5)

## 2024-10-13 PROCEDURE — 82746 ASSAY OF FOLIC ACID SERUM: CPT

## 2024-10-13 PROCEDURE — 85610 PROTHROMBIN TIME: CPT

## 2024-10-13 PROCEDURE — 97116 GAIT TRAINING THERAPY: CPT | Mod: GP

## 2024-10-13 PROCEDURE — 83615 LACTATE (LD) (LDH) ENZYME: CPT

## 2024-10-13 PROCEDURE — 87205 SMEAR GRAM STAIN: CPT

## 2024-10-13 PROCEDURE — 83521 IG LIGHT CHAINS FREE EACH: CPT

## 2024-10-13 PROCEDURE — 85730 THROMBOPLASTIN TIME PARTIAL: CPT

## 2024-10-13 PROCEDURE — 87070 CULTURE OTHR SPECIMN AEROBIC: CPT

## 2024-10-13 PROCEDURE — 71275 CT ANGIOGRAPHY CHEST: CPT | Mod: MC

## 2024-10-13 PROCEDURE — 72193 CT PELVIS W/DYE: CPT | Mod: 26,MC

## 2024-10-13 PROCEDURE — 87116 MYCOBACTERIA CULTURE: CPT

## 2024-10-13 PROCEDURE — 85027 COMPLETE CBC AUTOMATED: CPT

## 2024-10-13 PROCEDURE — 87186 SC STD MICRODIL/AGAR DIL: CPT

## 2024-10-13 PROCEDURE — 87015 SPECIMEN INFECT AGNT CONCNTJ: CPT

## 2024-10-13 PROCEDURE — 86850 RBC ANTIBODY SCREEN: CPT

## 2024-10-13 PROCEDURE — 88280 CHROMOSOME KARYOTYPE STUDY: CPT

## 2024-10-13 PROCEDURE — 99285 EMERGENCY DEPT VISIT HI MDM: CPT

## 2024-10-13 PROCEDURE — 82607 VITAMIN B-12: CPT

## 2024-10-13 PROCEDURE — 82962 GLUCOSE BLOOD TEST: CPT

## 2024-10-13 PROCEDURE — 93010 ELECTROCARDIOGRAM REPORT: CPT

## 2024-10-13 PROCEDURE — 97162 PT EVAL MOD COMPLEX 30 MIN: CPT | Mod: GP

## 2024-10-13 PROCEDURE — 76870 US EXAM SCROTUM: CPT

## 2024-10-13 PROCEDURE — 36415 COLL VENOUS BLD VENIPUNCTURE: CPT

## 2024-10-13 PROCEDURE — 82784 ASSAY IGA/IGD/IGG/IGM EACH: CPT

## 2024-10-13 PROCEDURE — 88264 CHROMOSOME ANALYSIS 20-25: CPT

## 2024-10-13 PROCEDURE — 94640 AIRWAY INHALATION TREATMENT: CPT

## 2024-10-13 PROCEDURE — 55110 EXPLORE SCROTUM: CPT

## 2024-10-13 PROCEDURE — 88271 CYTOGENETICS DNA PROBE: CPT

## 2024-10-13 PROCEDURE — 84443 ASSAY THYROID STIM HORMONE: CPT

## 2024-10-13 PROCEDURE — 83540 ASSAY OF IRON: CPT

## 2024-10-13 PROCEDURE — 87102 FUNGUS ISOLATION CULTURE: CPT

## 2024-10-13 PROCEDURE — 97530 THERAPEUTIC ACTIVITIES: CPT | Mod: GP

## 2024-10-13 PROCEDURE — 82728 ASSAY OF FERRITIN: CPT

## 2024-10-13 PROCEDURE — 83550 IRON BINDING TEST: CPT

## 2024-10-13 PROCEDURE — 86900 BLOOD TYPING SEROLOGIC ABO: CPT

## 2024-10-13 PROCEDURE — 80048 BASIC METABOLIC PNL TOTAL CA: CPT

## 2024-10-13 PROCEDURE — 88185 FLOWCYTOMETRY/TC ADD-ON: CPT

## 2024-10-13 PROCEDURE — 88275 CYTOGENETICS 100-300: CPT

## 2024-10-13 PROCEDURE — 99223 1ST HOSP IP/OBS HIGH 75: CPT

## 2024-10-13 PROCEDURE — 86901 BLOOD TYPING SEROLOGIC RH(D): CPT

## 2024-10-13 PROCEDURE — 85025 COMPLETE CBC W/AUTO DIFF WBC: CPT

## 2024-10-13 PROCEDURE — 80053 COMPREHEN METABOLIC PANEL: CPT

## 2024-10-13 PROCEDURE — 74177 CT ABD & PELVIS W/CONTRAST: CPT | Mod: MC

## 2024-10-13 PROCEDURE — 88184 FLOWCYTOMETRY/ TC 1 MARKER: CPT

## 2024-10-13 PROCEDURE — 93005 ELECTROCARDIOGRAM TRACING: CPT

## 2024-10-13 PROCEDURE — 80202 ASSAY OF VANCOMYCIN: CPT

## 2024-10-13 PROCEDURE — 85045 AUTOMATED RETICULOCYTE COUNT: CPT

## 2024-10-13 PROCEDURE — 83010 ASSAY OF HAPTOGLOBIN QUANT: CPT

## 2024-10-13 PROCEDURE — 87075 CULTR BACTERIA EXCEPT BLOOD: CPT

## 2024-10-13 PROCEDURE — 88237 TISSUE CULTURE BONE MARROW: CPT

## 2024-10-13 PROCEDURE — 87206 SMEAR FLUORESCENT/ACID STAI: CPT

## 2024-10-13 PROCEDURE — 81003 URINALYSIS AUTO W/O SCOPE: CPT

## 2024-10-13 PROCEDURE — 87077 CULTURE AEROBIC IDENTIFY: CPT

## 2024-10-13 PROCEDURE — 76870 US EXAM SCROTUM: CPT | Mod: 26

## 2024-10-13 RX ORDER — ONDANSETRON HYDROCHLORIDE 2 MG/ML
4 INJECTION, SOLUTION INTRAMUSCULAR; INTRAVENOUS EVERY 6 HOURS
Refills: 0 | Status: DISCONTINUED | OUTPATIENT
Start: 2024-10-13 | End: 2024-10-24

## 2024-10-13 RX ORDER — ALBUTEROL 90 MCG
2 AEROSOL (GRAM) INHALATION EVERY 6 HOURS
Refills: 0 | Status: DISCONTINUED | OUTPATIENT
Start: 2024-10-13 | End: 2024-10-24

## 2024-10-13 RX ORDER — CEFEPIME 2 G/1
1000 INJECTION, POWDER, FOR SOLUTION INTRAVENOUS EVERY 12 HOURS
Refills: 0 | Status: DISCONTINUED | OUTPATIENT
Start: 2024-10-13 | End: 2024-10-13

## 2024-10-13 RX ORDER — MORPHINE SULFATE 30 MG/1
4 TABLET, EXTENDED RELEASE ORAL ONCE
Refills: 0 | Status: DISCONTINUED | OUTPATIENT
Start: 2024-10-13 | End: 2024-10-13

## 2024-10-13 RX ORDER — SODIUM CHLORIDE 9 MG/ML
750 INJECTION, SOLUTION INTRAMUSCULAR; INTRAVENOUS; SUBCUTANEOUS
Refills: 0 | Status: DISCONTINUED | OUTPATIENT
Start: 2024-10-13 | End: 2024-10-19

## 2024-10-13 RX ORDER — TRAZODONE HYDROCHLORIDE 100 MG/1
100 TABLET ORAL AT BEDTIME
Refills: 0 | Status: DISCONTINUED | OUTPATIENT
Start: 2024-10-13 | End: 2024-10-24

## 2024-10-13 RX ORDER — OXYCODONE HYDROCHLORIDE 30 MG/1
5 TABLET ORAL EVERY 4 HOURS
Refills: 0 | Status: DISCONTINUED | OUTPATIENT
Start: 2024-10-13 | End: 2024-10-18

## 2024-10-13 RX ORDER — HYDROMORPHONE HCL/0.9% NACL/PF 6 MG/30 ML
1 PATIENT CONTROLLED ANALGESIA SYRINGE INTRAVENOUS ONCE
Refills: 0 | Status: DISCONTINUED | OUTPATIENT
Start: 2024-10-13 | End: 2024-10-13

## 2024-10-13 RX ORDER — ONDANSETRON HYDROCHLORIDE 2 MG/ML
4 INJECTION, SOLUTION INTRAMUSCULAR; INTRAVENOUS ONCE
Refills: 0 | Status: DISCONTINUED | OUTPATIENT
Start: 2024-10-13 | End: 2024-10-13

## 2024-10-13 RX ORDER — PIPERACILLIN AND TAZOBACTAM .5; 4 G/20ML; G/20ML
3.38 INJECTION, POWDER, LYOPHILIZED, FOR SOLUTION INTRAVENOUS EVERY 8 HOURS
Refills: 0 | Status: ACTIVE | OUTPATIENT
Start: 2024-10-13 | End: 2024-10-20

## 2024-10-13 RX ORDER — ACETAMINOPHEN 500 MG
650 TABLET ORAL EVERY 6 HOURS
Refills: 0 | Status: DISCONTINUED | OUTPATIENT
Start: 2024-10-13 | End: 2024-10-24

## 2024-10-13 RX ORDER — SODIUM CHLORIDE 9 MG/ML
1000 INJECTION, SOLUTION INTRAMUSCULAR; INTRAVENOUS; SUBCUTANEOUS
Refills: 0 | Status: COMPLETED | OUTPATIENT
Start: 2024-10-13 | End: 2024-10-13

## 2024-10-13 RX ORDER — DULOXETINE HYDROCHLORIDE 30 MG/1
60 CAPSULE, DELAYED RELEASE ORAL DAILY
Refills: 0 | Status: DISCONTINUED | OUTPATIENT
Start: 2024-10-13 | End: 2024-10-24

## 2024-10-13 RX ORDER — HYDROMORPHONE HCL/0.9% NACL/PF 6 MG/30 ML
1 PATIENT CONTROLLED ANALGESIA SYRINGE INTRAVENOUS EVERY 4 HOURS
Refills: 0 | Status: DISCONTINUED | OUTPATIENT
Start: 2024-10-13 | End: 2024-10-20

## 2024-10-13 RX ORDER — CEFEPIME 2 G/1
1000 INJECTION, POWDER, FOR SOLUTION INTRAVENOUS ONCE
Refills: 0 | Status: COMPLETED | OUTPATIENT
Start: 2024-10-13 | End: 2024-10-13

## 2024-10-13 RX ORDER — VANCOMYCIN HYDROCHLORIDE 50 MG/ML
1000 KIT ORAL ONCE
Refills: 0 | Status: COMPLETED | OUTPATIENT
Start: 2024-10-13 | End: 2024-10-13

## 2024-10-13 RX ORDER — ONDANSETRON HYDROCHLORIDE 2 MG/ML
4 INJECTION, SOLUTION INTRAMUSCULAR; INTRAVENOUS ONCE
Refills: 0 | Status: COMPLETED | OUTPATIENT
Start: 2024-10-13 | End: 2024-10-13

## 2024-10-13 RX ORDER — FENTANYL CITRAT/DEXTROSE 5%/PF 1250MCG/50
50 PATIENT CONTROLLED ANALGESIA SYRINGE INTRAVENOUS
Refills: 0 | Status: DISCONTINUED | OUTPATIENT
Start: 2024-10-13 | End: 2024-10-13

## 2024-10-13 RX ORDER — LEVOTHYROXINE SODIUM 88 MCG
75 TABLET ORAL DAILY
Refills: 0 | Status: DISCONTINUED | OUTPATIENT
Start: 2024-10-13 | End: 2024-10-24

## 2024-10-13 RX ORDER — FENTANYL CITRAT/DEXTROSE 5%/PF 1250MCG/50
25 PATIENT CONTROLLED ANALGESIA SYRINGE INTRAVENOUS
Refills: 0 | Status: DISCONTINUED | OUTPATIENT
Start: 2024-10-13 | End: 2024-10-13

## 2024-10-13 RX ORDER — TIOTROPIUM BROMIDE AND OLODATEROL 3.124; 2.736 UG/1; UG/1
2 SPRAY, METERED RESPIRATORY (INHALATION) DAILY
Refills: 0 | Status: DISCONTINUED | OUTPATIENT
Start: 2024-10-13 | End: 2024-10-23

## 2024-10-13 RX ORDER — VANCOMYCIN HYDROCHLORIDE 50 MG/ML
1000 KIT ORAL EVERY 12 HOURS
Refills: 0 | Status: DISCONTINUED | OUTPATIENT
Start: 2024-10-13 | End: 2024-10-13

## 2024-10-13 RX ORDER — TIOTROPIUM BROMIDE AND OLODATEROL 3.124; 2.736 UG/1; UG/1
2 SPRAY, METERED RESPIRATORY (INHALATION)
Refills: 0 | DISCHARGE

## 2024-10-13 RX ORDER — CEFEPIME 2 G/1
1000 INJECTION, POWDER, FOR SOLUTION INTRAVENOUS ONCE
Refills: 0 | Status: DISCONTINUED | OUTPATIENT
Start: 2024-10-13 | End: 2024-10-13

## 2024-10-13 RX ORDER — SODIUM CHLORIDE 9 MG/ML
1000 INJECTION, SOLUTION INTRAMUSCULAR; INTRAVENOUS; SUBCUTANEOUS ONCE
Refills: 0 | Status: COMPLETED | OUTPATIENT
Start: 2024-10-13 | End: 2024-10-13

## 2024-10-13 RX ADMIN — Medication 50 MICROGRAM(S): at 15:33

## 2024-10-13 RX ADMIN — TRAZODONE HYDROCHLORIDE 100 MILLIGRAM(S): 100 TABLET ORAL at 23:28

## 2024-10-13 RX ADMIN — Medication 1 MILLIGRAM(S): at 06:17

## 2024-10-13 RX ADMIN — VANCOMYCIN HYDROCHLORIDE 250 MILLIGRAM(S): KIT at 10:12

## 2024-10-13 RX ADMIN — Medication 50 MICROGRAM(S): at 14:50

## 2024-10-13 RX ADMIN — Medication 50 MICROGRAM(S): at 14:35

## 2024-10-13 RX ADMIN — SODIUM CHLORIDE 2000 MILLILITER(S): 9 INJECTION, SOLUTION INTRAMUSCULAR; INTRAVENOUS; SUBCUTANEOUS at 01:54

## 2024-10-13 RX ADMIN — Medication 75 MILLILITER(S): at 15:42

## 2024-10-13 RX ADMIN — Medication 1 MILLIGRAM(S): at 16:02

## 2024-10-13 RX ADMIN — Medication 1 MILLIGRAM(S): at 03:45

## 2024-10-13 RX ADMIN — OXYCODONE HYDROCHLORIDE 5 MILLIGRAM(S): 30 TABLET ORAL at 19:29

## 2024-10-13 RX ADMIN — Medication 10 MILLIGRAM(S): at 10:12

## 2024-10-13 RX ADMIN — Medication 1 MILLIGRAM(S): at 04:00

## 2024-10-13 RX ADMIN — CEFEPIME 1000 MILLIGRAM(S): 2 INJECTION, POWDER, FOR SOLUTION INTRAVENOUS at 10:11

## 2024-10-13 RX ADMIN — PIPERACILLIN AND TAZOBACTAM 25 GRAM(S): .5; 4 INJECTION, POWDER, LYOPHILIZED, FOR SOLUTION INTRAVENOUS at 22:07

## 2024-10-13 RX ADMIN — SODIUM CHLORIDE 75 MILLILITER(S): 9 INJECTION, SOLUTION INTRAMUSCULAR; INTRAVENOUS; SUBCUTANEOUS at 10:10

## 2024-10-13 RX ADMIN — CEFEPIME 1000 MILLIGRAM(S): 2 INJECTION, POWDER, FOR SOLUTION INTRAVENOUS at 02:58

## 2024-10-13 RX ADMIN — PIPERACILLIN AND TAZOBACTAM 25 GRAM(S): .5; 4 INJECTION, POWDER, LYOPHILIZED, FOR SOLUTION INTRAVENOUS at 15:42

## 2024-10-13 RX ADMIN — ONDANSETRON HYDROCHLORIDE 4 MILLIGRAM(S): 2 INJECTION, SOLUTION INTRAMUSCULAR; INTRAVENOUS at 01:52

## 2024-10-13 RX ADMIN — OXYCODONE HYDROCHLORIDE 5 MILLIGRAM(S): 30 TABLET ORAL at 18:59

## 2024-10-13 RX ADMIN — VANCOMYCIN HYDROCHLORIDE 250 MILLIGRAM(S): KIT at 02:59

## 2024-10-13 RX ADMIN — DULOXETINE HYDROCHLORIDE 60 MILLIGRAM(S): 30 CAPSULE, DELAYED RELEASE ORAL at 10:12

## 2024-10-13 RX ADMIN — Medication 1 MILLIGRAM(S): at 16:32

## 2024-10-13 RX ADMIN — Medication 50 MICROGRAM(S): at 14:51

## 2024-10-13 RX ADMIN — TIOTROPIUM BROMIDE AND OLODATEROL 2 PUFF(S): 3.124; 2.736 SPRAY, METERED RESPIRATORY (INHALATION) at 12:32

## 2024-10-13 RX ADMIN — MORPHINE SULFATE 4 MILLIGRAM(S): 30 TABLET, EXTENDED RELEASE ORAL at 02:10

## 2024-10-13 RX ADMIN — MORPHINE SULFATE 4 MILLIGRAM(S): 30 TABLET, EXTENDED RELEASE ORAL at 01:52

## 2024-10-13 NOTE — PROGRESS NOTE ADULT - ASSESSMENT
63 y/o M w/ PMH of COPD, CLL, lyme disease, hypothyoridism, cardiac arrest, p/w scrotal pain. Patient states that five days ago a dog jumped on him and cause a small puncture wound to his scrotum. Initially, it was only a small red leslie, but then it started to progress over time and increased in swelling and pain. Patient denies any fever or chills. Patient also, complains about intermittent abdominal discomfort, but is vague about the symptoms. Debra, nausea, vomiting, chest pain, shortness of breath, cough, runny nose, sore throat. As per ED physician, patient was seen by the surgery p a covering for urology team in the emergency room    # Scrotal abscess  -Continue Vanco & Cefepime   -ID consult  -NPO after MN in case patient requires procedure   -For possible I&D as per urology  -Scrotal elevation  - FU blood cultures     # Leukocytosis  - Unknown baseline WBC with CLL  - Likely multifactorial  - Consult oncology     # Anemia  - Likely ACD  - Follow up outpatient    # COPD  - Continue spiriva  - Continue prednisone    # Depression  - Continue duloxetine    # Insomnia  - Continue trazodone    # DVT ppx  -SCDs 2/2 possible procedure in AM  65 y/o M w/ PMH of COPD, CLL, lyme disease, hypothyoridism, cardiac arrest, p/w scrotal pain. Patient states that five days ago a dog jumped on him and cause a small puncture wound to his scrotum. Initially, it was only a small red leslie, but then it started to progress over time and increased in swelling and pain. Patient denies any fever or chills. Patient also, complains about intermittent abdominal discomfort, but is vague about the symptoms. Debra, nausea, vomiting, chest pain, shortness of breath, cough, runny nose, sore throat. As per ED physician, patient was seen by the surgery p a covering for urology team in the emergency room    # Scrotal abscess  - SP Vanco & Cefepime   -ID consult  -start zosyn as per ID  -NPO after MN in case patient requires procedure   -For possible I&D as per urology  -Scrotal elevation  - FU blood cultures     # Leukocytosis  - Unknown baseline WBC with CLL  - Likely multifactorial  - Consult oncology     # Anemia  - Likely ACD  - Follow up outpatient    # COPD  - Continue spiriva  - Continue prednisone    # Depression  - Continue duloxetine    # Insomnia  - Continue trazodone    # DVT ppx  -SCDs 2/2 possible procedure in AM  65 y/o M w/ PMH of COPD, CLL, lyme disease, hypothyoridism, cardiac arrest, p/w scrotal pain. Patient states that five days ago a dog jumped on him and cause a small puncture wound to his scrotum. Initially, it was only a small red leslie, but then it started to progress over time and increased in swelling and pain. Patient denies any fever or chills. Patient also, complains about intermittent abdominal discomfort, but is vague about the symptoms. Debra, nausea, vomiting, chest pain, shortness of breath, cough, runny nose, sore throat. As per ED physician, patient was seen by the surgery p a covering for urology team in the emergency room    # Scrotal abscess  - SP Vanco & Cefepime   -ID consult  -start zosyn as per ID  -NPO after MN in case patient requires procedure   -For possible I&D as per urology  -Scrotal elevation  - FU blood cultures     # Leukocytosis  - Unknown baseline WBC with CLL  - Likely multifactorial  - Consult oncology     # Hypothyroid  - Start 75 mg po q am    # Anemia  - Likely ACD  - Follow up outpatient    # COPD  - Continue spiriva  - Continue prednisone    # Depression  - Continue duloxetine    # Insomnia  - Continue trazodone    # DVT ppx  -SCDs 2/2 possible procedure in AM  65 y/o M w/ PMH of COPD, CLL, lyme disease, hypothyoridism, cardiac arrest, p/w scrotal pain. Patient states that five days ago a dog jumped on him and cause a small puncture wound to his scrotum. Initially, it was only a small red leslie, but then it started to progress over time and increased in swelling and pain. Patient denies any fever or chills. Patient also, complains about intermittent abdominal discomfort, but is vague about the symptoms. Debra, nausea, vomiting, chest pain, shortness of breath, cough, runny nose, sore throat. As per ED physician, patient was seen by the surgery p a covering for urology team in the emergency room    # Scrotal abscess  - SP Vanco & Cefepime   -ID consult  -start zosyn as per ID  -NPO after MN in case patient requires procedure   -For possible I&D as per urology  -Scrotal elevation  - FU blood cultures  - Patient optimized for OR     # Leukocytosis  - Unknown baseline WBC with CLL  - Likely multifactorial  - Consult oncology     # Hypothyroid  - Start 75 mg po q am    # Anemia  - Likely ACD  - Follow up outpatient    # COPD  - Continue spiriva  - Continue prednisone    # Depression  - Continue duloxetine    # Insomnia  - Continue trazodone    # DVT ppx  -SCDs 2/2 possible procedure in AM

## 2024-10-13 NOTE — H&P ADULT - HISTORY OF PRESENT ILLNESS
65 y/o M w/ PMH of COPD, CLL, lyme disease, hypothyoridism, cardiac arrest, p/w scrotal pain. Patient states that five days ago a dog jumped on him and cause a small puncture wound to his scrotum. Initially, it was only a small red leslie, but then it started to progress over time and increased in swelling and pain. Patient denies any fever or chills. Patient also, complains about intermittent abdominal discomfort, but is vague about the symptoms. Debra, nausea, vomiting, chest pain, shortness of breath, cough, runny nose, sore throat. As per ED physician, patient was seen by the surgery p a covering for urology team in the emergency room.       PSH: Shoulder surgery, sinus surgery     Social Hx: Denies tobacco / etoh, h/o cocaine use     Family Hx: Father - alzheimer's disease, mother - brain / lung cancer

## 2024-10-13 NOTE — H&P ADULT - ASSESSMENT
65 y/o M w/ PMH of COPD, CLL, lyme disease, hypothyoridism, cardiac arrest, p/w scrotal pain    *Scrotal abscess  -CT: Large multilocular tubular rim-enhancing fluid collection in the R hemiscrotum measuring up to 3 cm in diameter with pigtail-like extension extending along the right spermatic cord. Collection extends to the skin   surface at the level of the base of the penis. Scrotal wall edema. Constellation of findings and clinical history are most compatible with an abscess.  -Vanco / Zosyn  -Lactate = 0.9   -ID consult  - consult  -NPO after MN in case patient requires procedure   -Patient evaluated by surgery PA in behalf of urologist, awaiting recommendations.     *Nonspecific mildly dilated fluid-filled small bowel loops in the R lower abdomen noted on CT  -F/u surgery  -NPO   -IVF    *Anemia  -F/u outpatient for further evaluation    *CLL / hypothyroidism / cardiac arrest / COPD  -C/w home meds and f/u outpatient for further management     *DVT ppx  -SCDs 2/2 possible procedure in AM     >75 mins required for admission  65 y/o M w/ PMH of COPD, CLL, lyme disease, hypothyoridism, cardiac arrest, p/w scrotal pain    *Scrotal abscess  -CT: Large multilocular tubular rim-enhancing fluid collection in the R hemiscrotum measuring up to 3 cm in diameter with pigtail-like extension extending along the right spermatic cord. Collection extends to the skin   surface at the level of the base of the penis. Scrotal wall edema. Constellation of findings and clinical history are most compatible with an abscess.  -Vanco / Cefepime   -Lactate = 0.9   -ID consult  - consult  -NPO after MN in case patient requires procedure   -Patient evaluated by surgery PA in behalf of urologist, awaiting recommendations.     *Nonspecific mildly dilated fluid-filled small bowel loops in the R lower abdomen noted on CT  -F/u surgery  -NPO   -IVF    *Anemia  -F/u outpatient for further evaluation    *CLL / hypothyroidism / cardiac arrest / COPD  -C/w home meds and f/u outpatient for further management     *DVT ppx  -SCDs 2/2 possible procedure in AM     >75 mins required for admission

## 2024-10-13 NOTE — CONSULT NOTE ADULT - ASSESSMENT
63 y/o M with known CLL from a few years ago, followed with Heme Onc Dr Castaneda but has not been seen in the office in 2-3 years currently admitted with scrotal swelling / infection      # Leukocytosis with Known CLL    - WBC count currently >20K  - Patient has known CLL from prior, has not followed up outpatient in a few years  - Baseline WBC count from 2022 seems to be about 15 - 18K  - Currently admitted with acute scrotal swelling / pain; Uro & Surgery following with plan for OR today 10/13/24 to receive I&D ---> contributory to higher counts   - Can had repeat serum Flow Cyto drawn tomorrow 10/14/24 but may be skewed in setting of acute infectious / inflammatory process  - Patient admitted he would like to follow up with Dr Castandea again as long as insurance allows  - Continue to trend serial CBCs while admitted  - WBC count should trend down with appropriate infection management / tx  - Continue supportive measures        Enrique Dozier PA-C  Hematology Oncology  [facility] [place]  [phone] 63 y/o M with known CLL from a few years ago, followed with Heme Onc Dr Castaneda but has not been seen in the office in 2-3 years currently admitted with scrotal swelling / infection      # Leukocytosis & Macrocytic Anemia with Known CLL    - WBC count currently >20K  - Patient has known CLL from prior, has not followed up outpatient in a few years  - Baseline WBC count from 2022 seems to be about 15 - 18K  - Hgb currently ~11.0 g/dL   - MCV >110  - Currently admitted with acute scrotal swelling / pain; Uro & Surgery following with plan for OR today 10/13/24 to receive I&D ---> contributory to elevated WBC and suppressed Hgb  - Can had repeat serum Flow Cyto drawn tomorrow 10/14/24 but may be skewed in setting of acute infectious / inflammatory process  - Will add anemia / hemolysis labs as well for completeness   - Patient admitted he would like to follow up with Dr Castaneda again as long as insurance allows  - Continue to trend serial CBCs while admitted  - WBC count should trend down with appropriate infection management / tx  - Continue supportive measures        Enrique Dozier PA-C  Hematology Oncology  [facility] [place]  [phone] 63 y/o M with known CLL from a few years ago, followed with Heme Onc Dr Castaneda but has not been seen in the office in 2-3 years currently admitted with scrotal swelling / infection      # Leukocytosis & Macrocytic Anemia with Known CLL    - WBC count currently ~30K  - Patient has known CLL from prior, has not followed up outpatient in a few years  - Baseline WBC count from 2022 seems to be about 15 - 18K  - Hgb currently ~11.0 g/dL   - MCV >110  - Currently admitted with acute scrotal swelling / pain; Uro & Surgery following with plan for OR today 10/13/24 to receive I&D ---> contributory to elevated WBC and suppressed Hgb  - Can had repeat serum Flow Cyto drawn tomorrow 10/14/24 but may be skewed in setting of acute infectious / inflammatory process  - Will add anemia / hemolysis labs including but not limited to B12, Folate, LDH, Hapto  - Patient admitted he would like to follow up with Dr Castaneda again as long as insurance allows  - Continue to trend serial CBCs while admitted  - WBC count should trend down with appropriate infection management / tx  - Continue supportive measures        Enrique Dozier PA-C  Hematology Oncology  [facility] [place]  [phone]

## 2024-10-13 NOTE — ED ADULT NURSE NOTE - OBJECTIVE STATEMENT
Pt A&OX4 presenting to the ED c/o R testicular pain and swelling. Pt reports x5 days ago his "dog jumped up to say hello and caught his testicle and has been feeling pain for 5 days and got worse tonight". Pt appears to be in pain. Pt denies any other complaints at this time.

## 2024-10-13 NOTE — PROVIDER CONTACT NOTE (OTHER) - SITUATION
Blood soaked pad post I+D. Sutures to scrotum with packing. Patient c/o burning at tip. PA made aware. Medicated for pain with positive response noted.

## 2024-10-13 NOTE — CONSULT NOTE ADULT - ASSESSMENT
65 y/o M w/ PMH of COPD, CLL, lyme disease, hypothyroidism cardiac arrest, no PSHX in abdomen; p/w scrotal pain. Found to have an abscess. Complains about intermittent abdominal discomfort, but is vague about the symptoms. Debra, nausea, vomiting, chest pain, shortness of breath, cough, runny nose, sore throat.      General surgery consulted for non specific CT findings of mildly dilated fluid-filled small bowel loops in the right lower abdomen.  Last BM yesterday. Still passing flatus. Denies N/v    Clinically non obstructed    P:  - No acute general surgical intervention  - Urology for scrotal abscess  - Rest of management as per primary team    Plan discussed with Dr Herrera

## 2024-10-13 NOTE — ED ADULT TRIAGE NOTE - CHIEF COMPLAINT QUOTE
Pt A&OX4 presenting to the ED c/o R testicular pain and swelling. Pt reports x5 days ago his "dog jumped up to say hello and caught his testicle and has been feeling pain for 5 days and got worse tonight". Pt appears to be in pain. Pt denies any other complaints at this time. Respirations even and unlabored, no distress noted.

## 2024-10-13 NOTE — ED PROVIDER NOTE - CLINICAL SUMMARY MEDICAL DECISION MAKING FREE TEXT BOX
64-year-old male with history of COPD, CLL, prior Lyme's disease, hypothyroidism and prior cardiac arrest presents for evaluation of progressively worsening right hemiscrotum pain swelling and tenderness that started after his 80 pound dog jumped up and put his front paws on his groin forcefully causing an abrasion through his jeans approximately 5 days ago.  Patient states that there was a small "red leslie" of the superior right hemiscrotum but no bleeding noted.  Patient also notes that 24 hours afterwards,  there was some clear fluid coming from the site but no obvious pus.  Patient denies any red streaking from the wound.  Patient is afebrile.  Patient notes that the area has become "very swollen and hard.  It is tender to palpation and hurts when he is walking.  On exam the patient is awake, alert and oriented x 4 he is in no apparent distress.  His heart is regular rate and rhythm and his lungs are clear to auscultation bilaterally with no distress.  Abdomen is soft and nondistended with some right lower quadrant tenderness.  Patient has significantly swollen right hemiscrotum with tenderness to palpation and induration.  Patient has a less than 1 cm abrasion of the anterior superior right hemiscrotum.  There is concern for cellulitis/abscess of the scrotum.  Will get labs to include CBC, lactate, blood cultures, administer pain control as needed, IV fluids, IV antibiotics and CT of the pelvis with IV contrast.  If patient is found to have a scrotal abscess, will need to consult with urology.

## 2024-10-13 NOTE — CONSULT NOTE ADULT - NS ATTEND AMEND GEN_ALL_CORE FT
64 year old male with CLL, COPD, admitted with scrotal abscess s/p drainage  WBC 30 in setting of CLL and infection, mild anemia with macrocytosis - etiology unclear  Anemia work up pending  Needs outpatient follow up with Dr. Castaneda after discharge.

## 2024-10-13 NOTE — PROGRESS NOTE ADULT - SUBJECTIVE AND OBJECTIVE BOX
HOSPITALIST ATTENDING PROGRESS NOTE    Chart and meds reviewed.  Patient seen and examined.    CC: scrotal abscess    Subjective: No acute issues overnight. Has scrotal abscess    All other systems reviewed and found to be negative with the exception of what has been described above.    MEDICATIONS  (STANDING):  cefepime  Injectable. 1000 milliGRAM(s) IV Push every 12 hours  DULoxetine 60 milliGRAM(s) Oral daily  predniSONE   Tablet 10 milliGRAM(s) Oral daily  sodium chloride 0.9%. 1000 milliLiter(s) (125 mL/Hr) IV Continuous <Continuous>  sodium chloride 0.9%. 750 milliLiter(s) (75 mL/Hr) IV Continuous <Continuous>  tiotropium 2.5 MICROgram(s)/olodaterol 2.5 MICROgram(s) (STIOLTO) Inhaler 2 Puff(s) Inhalation daily  traZODone 100 milliGRAM(s) Oral at bedtime  vancomycin  IVPB 1000 milliGRAM(s) IV Intermittent every 12 hours    MEDICATIONS  (PRN):  acetaminophen     Tablet .. 650 milliGRAM(s) Oral every 6 hours PRN Temp greater or equal to 38C (100.4F), Mild Pain (1 - 3)  albuterol    90 MICROgram(s) HFA Inhaler 2 Puff(s) Inhalation every 6 hours PRN Bronchospasm  HYDROmorphone  Injectable 1 milliGRAM(s) IV Push every 4 hours PRN Severe Pain (7 - 10)  ondansetron Injectable 4 milliGRAM(s) IV Push every 6 hours PRN Nausea and/or Vomiting    Vital Signs Last 24 Hrs  T(C): 36.6 (13 Oct 2024 08:24), Max: 37.3 (13 Oct 2024 00:39)  T(F): 97.9 (13 Oct 2024 08:24), Max: 99.2 (13 Oct 2024 00:39)  HR: 63 (13 Oct 2024 08:24) (62 - 78)  BP: 108/73 (13 Oct 2024 08:24) (108/73 - 122/84)  BP(mean): 102 (13 Oct 2024 00:39) (102 - 102)  RR: 16 (13 Oct 2024 08:24) (16 - 19)  SpO2: 95% (13 Oct 2024 08:24) (95% - 100%)    Parameters below as of 13 Oct 2024 08:24  Patient On (Oxygen Delivery Method): room air    GEN: NAD  HEENT:  pupils equal and reactive, EOMI, no oropharyngeal lesions, erythema, exudates, oral thrush  NECK:   supple, no carotid bruits  CV:  +S1, +S2, regular, no murmurs  RESP:   lungs clear to auscultation bilaterally, no wheezing, rales, rhonchi, good air entry bilaterally  GI:  abdomen soft, non-tender, non-distended, normal BS  EXT:  no clubbing, no cyanosis, no edema, no calf pain, swelling or erythema  NEURO:  AAOX3, no focal neurological deficits, follows all commands, able to move extremities spontaneously  SKIN:  no ulcers, lesions or rashes  : Right side scrotal abscess    LABS:                          11.0   29.52 )-----------( 204      ( 13 Oct 2024 07:54 )             32.2     10-13    138  |  109[H]  |  13  ----------------------------<  105[H]  3.9   |  22  |  0.67    Ca    8.4[L]      13 Oct 2024 07:54    TPro  6.6  /  Alb  3.0[L]  /  TBili  0.2  /  DBili  x   /  AST  38[H]  /  ALT  37  /  AlkPhos  83  10-13        LIVER FUNCTIONS - ( 13 Oct 2024 07:54 )  Alb: 3.0 g/dL / Pro: 6.6 gm/dL / ALK PHOS: 83 U/L / ALT: 37 U/L / AST: 38 U/L / GGT: x           PT/INR - ( 13 Oct 2024 07:54 )   PT: 11.6 sec;   INR: 0.98 ratio    PTT - ( 13 Oct 2024 07:54 )  PTT:34.3 sec    Urinalysis Basic - ( 13 Oct 2024 07:54 )  Color: x / Appearance: x / SG: x / pH: x  Gluc: 105 mg/dL / Ketone: x  / Bili: x / Urobili: x   Blood: x / Protein: x / Nitrite: x   Leuk Esterase: x / RBC: x / WBC x   Sq Epi: x / Non Sq Epi: x / Bacteria: x       US Testicles (10.13.24 @ 04:41) >  IMPRESSION:  Partially visualized moderate sized complex collection in the right   scrotal wall, better depicted on the CT of the pelvis from earlier the   same day, compatible with an abscess.      : CT Pelvis w/ IV Cont (10.13.24 @ 02:18) >  IMPRESSION:  Large multilocular tubular rim-enhancing fluid collection in the right   hemiscrotum measuring up to 3 cm in diameter with pigtail-like extension   extending along the right spermatic cord. Collection extends to the skin   surface at the level of the base of the penis. Scrotal wall edema.   Constellation of findings and clinical history are most compatible with   an abscess.    Nonspecific mildly dilated fluid-filled small bowel loops in the right   lower abdomen.

## 2024-10-13 NOTE — CONSULT NOTE ADULT - ASSESSMENT
63 y/o M w/ PMH of COPD, CLL, lyme disease, hypothyoridism, cardiac arrest, p/w scrotal pain. Patient states that five days ago a dog jumped on him and cause a small puncture wound to his scrotum. Initially, it was only a small red leslie, but then it started to progress over time and increased in swelling and pain.  Patient also, complains about intermittent abdominal discomfort, but is vague about the symptoms. Denies, nausea, vomiting, chest pain, shortness of breath, cough, runny nose, sore throat. As per ED physician, patient was seen by the surgery p a covering for urology team in the emergency room. Wbc ct 29 UA positive, imaging shows Large multilocular tubular rim-enhancing fluid collection in the right hemiscrotum measuring up to 3 cm in diameter with pigtail-like extension extending along the right spermatic cord. Collection extends to the skin surface at the level of the base of the penis. Scrotal wall edema. Started on IV abx, urology consulted.     1. Severe scrotal abscess/cellulitis. Pyuria. Complicated UTI. CLL. Leukocytosis. s/p Dog bite  - imaging reviewed  - dc vancomycin/cefepime  - change to IV zosyn [id number]   - continue with antibiotic coverage   - f/u urine cx blood cx   - urology eval plan for I & D ? f/u cultures   - tolerating abx well so far; no side effects noted  - reason for abx use and side effects reviewed with patient  - supportive care  - fu cbc    Clinical team may change from intravenous to oral antibiotics when the following criteria are met:   1. Patient is clinically improving/stable       a)	Improved signs and symptoms of infection from initial presentation       b)	Afebrile for 24 hours       c)	Leukocytosis trending towards normal range   2. Patient is tolerating oral intake   3. Initial/repeat blood cultures are negative     Cannot advise changing to oral antibiotic therapy until culture sensitivity is available.

## 2024-10-13 NOTE — CONSULT NOTE ADULT - SUBJECTIVE AND OBJECTIVE BOX
CHIEF COMPLAINT:right scrotal abscess    HISTORY OF PRESENT ILLNESS:above secondary to dog bite    PAST MEDICAL & SURGICAL HISTORY:  CLL (chronic lymphocytic leukemia)      Chronic obstructive pulmonary disease (COPD)      Hypothyroidism      H/o Lyme disease      Hiatal hernia      History of herniated intervertebral disc      H/O sinus surgery      H/O shoulder surgery          REVIEW OF SYSTEMS:    CONSTITUTIONAL: No weakness, fevers or chills  EYES/ENT: No visual changes;  No vertigo or throat pain   NECK: No pain or stiffness  RESPIRATORY: No cough, wheezing, hemoptysis; No shortness of breath  CARDIOVASCULAR: No chest pain or palpitations  GASTROINTESTINAL: No abdominal or epigastric pain. No nausea, vomiting, or hematemesis; No diarrhea or constipation. No melena or hematochezia.  GENITOURINARY: No dysuria, frequency or hematuria  NEUROLOGICAL: No numbness or weakness  SKIN: No itching, burning, rashes, or lesions   All other review of systems is negative unless indicated above.    MEDICATIONS  (STANDING):  DULoxetine 60 milliGRAM(s) Oral daily  lactated ringers. 1000 milliLiter(s) (75 mL/Hr) IV Continuous <Continuous>  levothyroxine 75 MICROGram(s) Oral daily  piperacillin/tazobactam IVPB.. 3.375 Gram(s) IV Intermittent every 8 hours  predniSONE   Tablet 10 milliGRAM(s) Oral daily  predniSONE   Tablet 10 milliGRAM(s) Oral daily  sodium chloride 0.9%. 1000 milliLiter(s) (125 mL/Hr) IV Continuous <Continuous>  sodium chloride 0.9%. 750 milliLiter(s) (75 mL/Hr) IV Continuous <Continuous>  tiotropium 2.5 MICROgram(s)/olodaterol 2.5 MICROgram(s) (STIOLTO) Inhaler 2 Puff(s) Inhalation daily  traZODone 100 milliGRAM(s) Oral at bedtime    MEDICATIONS  (PRN):  acetaminophen     Tablet .. 650 milliGRAM(s) Oral every 6 hours PRN Temp greater or equal to 38C (100.4F), Mild Pain (1 - 3)  albuterol    90 MICROgram(s) HFA Inhaler 2 Puff(s) Inhalation every 6 hours PRN Bronchospasm  fentaNYL    Injectable 50 MICROGram(s) IV Push every 5 minutes PRN Severe Pain (7 - 10)  fentaNYL    Injectable 25 MICROGram(s) IV Push every 5 minutes PRN Moderate Pain (4 - 6)  HYDROmorphone  Injectable 1 milliGRAM(s) IV Push every 4 hours PRN Severe Pain (7 - 10)  ondansetron Injectable 4 milliGRAM(s) IV Push every 6 hours PRN Nausea and/or Vomiting  ondansetron Injectable 4 milliGRAM(s) IV Push once PRN Nausea and/or Vomiting      Allergies    No Known Allergies    Intolerances        SOCIAL HISTORY:    FAMILY HISTORY:  FH: lung cancer (Mother)    FH: brain cancer (Mother)    FH: Alzheimers disease (Father)        Vital Signs Last 24 Hrs  T(C): 36.6 (13 Oct 2024 08:24), Max: 37.3 (13 Oct 2024 00:39)  T(F): 97.9 (13 Oct 2024 08:24), Max: 99.2 (13 Oct 2024 00:39)  HR: 63 (13 Oct 2024 08:24) (62 - 78)  BP: 108/73 (13 Oct 2024 08:24) (108/73 - 122/84)  BP(mean): 102 (13 Oct 2024 00:39) (102 - 102)  RR: 16 (13 Oct 2024 08:24) (16 - 19)  SpO2: 95% (13 Oct 2024 08:24) (95% - 100%)    Parameters below as of 13 Oct 2024 08:24  Patient On (Oxygen Delivery Method): room air        PHYSICAL EXAM:    Constitutional: NAD, well-developed  HEENT: JESÚS, EOMI, Normal Hearing, MMM  Neck: No LAD, No JVD  Back: Normal spine flexure, No CVA tenderness  Respiratory: CTAB   Cardiovascular: S1 and S2, RRR, no M/G/R  Abd: BS+, soft, NT/ND, No CVAT  : Normal phallus,open meatus,bilateral descended testes, no masses/right scrotal abscess  FITZ: Normal prostate, no masses  Extremities: No peripheral edema  Vascular: 2+ peripheral pulses  Neurological: A/O x 3, no focal deficits  Psychiatric: Normal mood, normal affect  Musculoskeletal: 5/5 strength b/l upper and lower extremities  Skin: No rashes    LABS:                        11.0   29.52 )-----------( 204      ( 13 Oct 2024 07:54 )             32.2     10-13    138  |  109[H]  |  13  ----------------------------<  105[H]  3.9   |  22  |  0.67    Ca    8.4[L]      13 Oct 2024 07:54    TPro  6.6  /  Alb  3.0[L]  /  TBili  0.2  /  DBili  x   /  AST  38[H]  /  ALT  37  /  AlkPhos  83  10-13    PT/INR - ( 13 Oct 2024 07:54 )   PT: 11.6 sec;   INR: 0.98 ratio         PTT - ( 13 Oct 2024 07:54 )  PTT:34.3 sec  Urinalysis Basic - ( 13 Oct 2024 07:54 )    Color: x / Appearance: x / SG: x / pH: x  Gluc: 105 mg/dL / Ketone: x  / Bili: x / Urobili: x   Blood: x / Protein: x / Nitrite: x   Leuk Esterase: x / RBC: x / WBC x   Sq Epi: x / Non Sq Epi: x / Bacteria: x      Urine Culture:     RADIOLOGY & ADDITIONAL STUDIES:

## 2024-10-13 NOTE — CONSULT NOTE ADULT - SUBJECTIVE AND OBJECTIVE BOX
HPI:  65 y/o M w/ PMH of COPD, CLL, lyme disease, hypothyoridism, cardiac arrest, p/w scrotal pain. Patient states that five days ago a dog jumped on him and cause a small puncture wound to his scrotum. Initially, it was only a small red leslie, but then it started to progress over time and increased in swelling and pain. Patient denies any fever or chills. Patient also, complains about intermittent abdominal discomfort, but is vague about the symptoms. Debra, nausea, vomiting, chest pain, shortness of breath, cough, runny nose, sore throat.      General surgery consulted for non specific CT findings of mildly dilated fluid-filled small bowel loops in the right lower abdomen.        PAST MEDICAL & SURGICAL HISTORY:  CLL (chronic lymphocytic leukemia)      Chronic obstructive pulmonary disease (COPD)      Hypothyroidism      H/o Lyme disease      Hiatal hernia      History of herniated intervertebral disc      H/O sinus surgery      H/O shoulder surgery          MEDICATIONS  (STANDING):  cefepime  Injectable. 1000 milliGRAM(s) IV Push every 12 hours  DULoxetine 60 milliGRAM(s) Oral daily  predniSONE   Tablet 10 milliGRAM(s) Oral daily  sodium chloride 0.9%. 1000 milliLiter(s) (125 mL/Hr) IV Continuous <Continuous>  sodium chloride 0.9%. 750 milliLiter(s) (75 mL/Hr) IV Continuous <Continuous>  tiotropium 2.5 MICROgram(s)/olodaterol 2.5 MICROgram(s) (STIOLTO) Inhaler 2 Puff(s) Inhalation daily  traZODone 100 milliGRAM(s) Oral at bedtime  vancomycin  IVPB 1000 milliGRAM(s) IV Intermittent every 12 hours    MEDICATIONS  (PRN):  acetaminophen     Tablet .. 650 milliGRAM(s) Oral every 6 hours PRN Temp greater or equal to 38C (100.4F), Mild Pain (1 - 3)  albuterol    90 MICROgram(s) HFA Inhaler 2 Puff(s) Inhalation every 6 hours PRN Bronchospasm  HYDROmorphone  Injectable 1 milliGRAM(s) IV Push every 4 hours PRN Severe Pain (7 - 10)  ondansetron Injectable 4 milliGRAM(s) IV Push every 6 hours PRN Nausea and/or Vomiting      Allergies    No Known Allergies    Intolerances        SOCIAL HISTORY:    FAMILY HISTORY:  FH: lung cancer (Mother)    FH: brain cancer (Mother)    FH: Alzheimers disease (Father)            Physical Exam:  General: NAD, resting comfortably  HEENT: NC/AT, EOMI, normal hearing, no oral lesions, no LAD, neck supple  Pulmonary: normal resp effort, CTA-B  Cardiovascular: NSR, no murmurs  Abdominal: soft, ND/NT, no organomegaly  Extremities: WWP, normal strength, no clubbing/cyanosis/edema  Neuro: A/O x 3, CNs II-XII grossly intact, normal sensation, no focal deficits  Pulses: palpable distal pulses    Vital Signs Last 24 Hrs  T(C): 36.6 (13 Oct 2024 08:24), Max: 37.3 (13 Oct 2024 00:39)  T(F): 97.9 (13 Oct 2024 08:24), Max: 99.2 (13 Oct 2024 00:39)  HR: 63 (13 Oct 2024 08:24) (62 - 78)  BP: 108/73 (13 Oct 2024 08:24) (108/73 - 122/84)  BP(mean): 102 (13 Oct 2024 00:39) (102 - 102)  RR: 16 (13 Oct 2024 08:24) (16 - 19)  SpO2: 95% (13 Oct 2024 08:24) (95% - 100%)    Parameters below as of 13 Oct 2024 08:24  Patient On (Oxygen Delivery Method): room air      LABS:                        11.0   29.52 )-----------( 204      ( 13 Oct 2024 07:54 )             32.2     10-13    138  |  109[H]  |  13  ----------------------------<  105[H]  3.9   |  22  |  0.67    Ca    8.4[L]      13 Oct 2024 07:54    TPro  6.6  /  Alb  3.0[L]  /  TBili  0.2  /  DBili  x   /  AST  38[H]  /  ALT  37  /  AlkPhos  83  10-13    PT/INR - ( 13 Oct 2024 07:54 )   PT: 11.6 sec;   INR: 0.98 ratio         PTT - ( 13 Oct 2024 07:54 )  PTT:34.3 sec  Urinalysis Basic - ( 13 Oct 2024 07:54 )    Color: x / Appearance: x / SG: x / pH: x  Gluc: 105 mg/dL / Ketone: x  / Bili: x / Urobili: x   Blood: x / Protein: x / Nitrite: x   Leuk Esterase: x / RBC: x / WBC x   Sq Epi: x / Non Sq Epi: x / Bacteria: x      LIVER FUNCTIONS - ( 13 Oct 2024 07:54 )  Alb: 3.0 g/dL / Pro: 6.6 gm/dL / ALK PHOS: 83 U/L / ALT: 37 U/L / AST: 38 U/L / GGT: x              HPI:  65 y/o M w/ PMH of COPD, CLL, lyme disease, hypothyroidism cardiac arrest, no PSHX in abdomen; p/w scrotal pain. Patient states that five days ago a dog jumped on him and cause a small puncture wound to his scrotum. Initially, it was only a small red leslie, but then it started to progress over time and increased in swelling and pain. Patient denies any fever or chills. Patient also, complains about intermittent abdominal discomfort, but is vague about the symptoms. Debra, nausea, vomiting, chest pain, shortness of breath, cough, runny nose, sore throat.      General surgery consulted for non specific CT findings of mildly dilated fluid-filled small bowel loops in the right lower abdomen.  Last BM yesterday. Still passing flatus. Denies N/v        PAST MEDICAL & SURGICAL HISTORY:  CLL (chronic lymphocytic leukemia)      Chronic obstructive pulmonary disease (COPD)      Hypothyroidism      H/o Lyme disease      Hiatal hernia      History of herniated intervertebral disc      H/O sinus surgery      H/O shoulder surgery          MEDICATIONS  (STANDING):  cefepime  Injectable. 1000 milliGRAM(s) IV Push every 12 hours  DULoxetine 60 milliGRAM(s) Oral daily  predniSONE   Tablet 10 milliGRAM(s) Oral daily  sodium chloride 0.9%. 1000 milliLiter(s) (125 mL/Hr) IV Continuous <Continuous>  sodium chloride 0.9%. 750 milliLiter(s) (75 mL/Hr) IV Continuous <Continuous>  tiotropium 2.5 MICROgram(s)/olodaterol 2.5 MICROgram(s) (STIOLTO) Inhaler 2 Puff(s) Inhalation daily  traZODone 100 milliGRAM(s) Oral at bedtime  vancomycin  IVPB 1000 milliGRAM(s) IV Intermittent every 12 hours    MEDICATIONS  (PRN):  acetaminophen     Tablet .. 650 milliGRAM(s) Oral every 6 hours PRN Temp greater or equal to 38C (100.4F), Mild Pain (1 - 3)  albuterol    90 MICROgram(s) HFA Inhaler 2 Puff(s) Inhalation every 6 hours PRN Bronchospasm  HYDROmorphone  Injectable 1 milliGRAM(s) IV Push every 4 hours PRN Severe Pain (7 - 10)  ondansetron Injectable 4 milliGRAM(s) IV Push every 6 hours PRN Nausea and/or Vomiting      Allergies    No Known Allergies    Intolerances        SOCIAL HISTORY:    FAMILY HISTORY:  FH: lung cancer (Mother)    FH: brain cancer (Mother)    FH: Alzheimers disease (Father)            Physical Exam:  General: NAD, resting comfortably  HEENT: NC/AT, EOMI, normal hearing, no oral lesions, no LAD, neck supple  Pulmonary: normal resp effort, CTA-B  Cardiovascular: NSR, no murmurs  Abdominal: soft, ND, minimal RLQ tenderness, as per patient radiating from groin, no organomegaly  Extremities: WWP, normal strength, no clubbing/cyanosis/edema  Neuro: A/O x 3, CNs II-XII grossly intact, normal sensation, no focal deficits  Pulses: palpable distal pulses    Vital Signs Last 24 Hrs  T(C): 36.6 (13 Oct 2024 08:24), Max: 37.3 (13 Oct 2024 00:39)  T(F): 97.9 (13 Oct 2024 08:24), Max: 99.2 (13 Oct 2024 00:39)  HR: 63 (13 Oct 2024 08:24) (62 - 78)  BP: 108/73 (13 Oct 2024 08:24) (108/73 - 122/84)  BP(mean): 102 (13 Oct 2024 00:39) (102 - 102)  RR: 16 (13 Oct 2024 08:24) (16 - 19)  SpO2: 95% (13 Oct 2024 08:24) (95% - 100%)    Parameters below as of 13 Oct 2024 08:24  Patient On (Oxygen Delivery Method): room air      LABS:                        11.0   29.52 )-----------( 204      ( 13 Oct 2024 07:54 )             32.2     10-13    138  |  109[H]  |  13  ----------------------------<  105[H]  3.9   |  22  |  0.67    Ca    8.4[L]      13 Oct 2024 07:54    TPro  6.6  /  Alb  3.0[L]  /  TBili  0.2  /  DBili  x   /  AST  38[H]  /  ALT  37  /  AlkPhos  83  10-13    PT/INR - ( 13 Oct 2024 07:54 )   PT: 11.6 sec;   INR: 0.98 ratio         PTT - ( 13 Oct 2024 07:54 )  PTT:34.3 sec  Urinalysis Basic - ( 13 Oct 2024 07:54 )    Color: x / Appearance: x / SG: x / pH: x  Gluc: 105 mg/dL / Ketone: x  / Bili: x / Urobili: x   Blood: x / Protein: x / Nitrite: x   Leuk Esterase: x / RBC: x / WBC x   Sq Epi: x / Non Sq Epi: x / Bacteria: x      LIVER FUNCTIONS - ( 13 Oct 2024 07:54 )  Alb: 3.0 g/dL / Pro: 6.6 gm/dL / ALK PHOS: 83 U/L / ALT: 37 U/L / AST: 38 U/L / GGT: x

## 2024-10-13 NOTE — CONSULT NOTE ADULT - SUBJECTIVE AND OBJECTIVE BOX
Patient is a 64y old  Male who presents with a chief complaint of scrotal pain (13 Oct 2024 10:07)    HPI:  65 y/o M w/ PMH of COPD, CLL, lyme disease, hypothyoridism, cardiac arrest, p/w scrotal pain. Patient states that five days ago a dog jumped on him and cause a small puncture wound to his scrotum. Initially, it was only a small red leslie, but then it started to progress over time and increased in swelling and pain.  Patient also, complains about intermittent abdominal discomfort, but is vague about the symptoms. Denies, nausea, vomiting, chest pain, shortness of breath, cough, runny nose, sore throat. As per ED physician, patient was seen by the surgery p a covering for urology team in the emergency room. Wbc ct 29 UA positive, imaging shows Large multilocular tubular rim-enhancing fluid collection in the right hemiscrotum measuring up to 3 cm in diameter with pigtail-like extension extending along the right spermatic cord. Collection extends to the skin surface at the level of the base of the penis. Scrotal wall edema. Started on IV abx, urology consulted.           PSH: Shoulder surgery, sinus surgery   PMH: as above  Meds: per reconciliation sheet, noted below  MEDICATIONS  (STANDING):  cefepime  Injectable. 1000 milliGRAM(s) IV Push every 12 hours  DULoxetine 60 milliGRAM(s) Oral daily  lactated ringers. 1000 milliLiter(s) (75 mL/Hr) IV Continuous <Continuous>  predniSONE   Tablet 10 milliGRAM(s) Oral daily  sodium chloride 0.9%. 1000 milliLiter(s) (125 mL/Hr) IV Continuous <Continuous>  sodium chloride 0.9%. 750 milliLiter(s) (75 mL/Hr) IV Continuous <Continuous>  tiotropium 2.5 MICROgram(s)/olodaterol 2.5 MICROgram(s) (STIOLTO) Inhaler 2 Puff(s) Inhalation daily  traZODone 100 milliGRAM(s) Oral at bedtime  vancomycin  IVPB 1000 milliGRAM(s) IV Intermittent every 12 hours    Allergies    No Known Allergies    Intolerances      Social: no smoking, no alcohol, no illegal drugs; no recent travel, no exposure to TB  FAMILY HISTORY:  FH: lung cancer (Mother)    FH: brain cancer (Mother)    FH: Alzheimers disease (Father)       no history of premature cardiovascular disease in first degree relatives    ROS: the patient denies fever, no chills, no HA, no dizziness, no sore throat, no blurry vision, no CP, no palpitations, no SOB, no cough, + abdominal pain, no diarrhea, no N/V, + dysuria, no leg pain, no claudication, no rash, no joint aches, no rectal pain or bleeding, no night sweats     All other systems reviewed and are negative    Vital Signs Last 24 Hrs  T(C): 36.6 (13 Oct 2024 08:24), Max: 37.3 (13 Oct 2024 00:39)  T(F): 97.9 (13 Oct 2024 08:24), Max: 99.2 (13 Oct 2024 00:39)  HR: 63 (13 Oct 2024 08:24) (62 - 78)  BP: 108/73 (13 Oct 2024 08:24) (108/73 - 122/84)  BP(mean): 102 (13 Oct 2024 00:39) (102 - 102)  RR: 16 (13 Oct 2024 08:24) (16 - 19)  SpO2: 95% (13 Oct 2024 08:24) (95% - 100%)    Parameters below as of 13 Oct 2024 08:24  Patient On (Oxygen Delivery Method): room air      Daily     Daily     PE:  Constitutional: NAD  HEENT: NC/AT, EOMI, PERRLA, conjunctivae clear; ears and nose atraumatic; pharynx benign  Neck: supple; thyroid not palpable  Back: no tenderness  Respiratory: respiratory effort normal; clear to auscultation  Cardiovascular: S1S2 regular, no murmurs  Abdomen: soft, not tender, not distended, positive BS; liver and spleen WNL  Genitourinary: no suprapubic tenderness; scrotal edema tenderness warmth erythema   Lymphatic: no LN palpable  Musculoskeletal: no muscle tenderness, no joint swelling or tenderness  Extremities: no pedal edema  Neurological/ Psychiatric: AxOx3, Judgement and insight normal;  moving all extremities  Skin: no rashes; no palpable lesions    Labs: all available labs reviewed                        11.0   29.52 )-----------( 204      ( 13 Oct 2024 07:54 )             32.2     10-13    138  |  109[H]  |  13  ----------------------------<  105[H]  3.9   |  22  |  0.67    Ca    8.4[L]      13 Oct 2024 07:54    TPro  6.6  /  Alb  3.0[L]  /  TBili  0.2  /  DBili  x   /  AST  38[H]  /  ALT  37  /  AlkPhos  83  10-13     LIVER FUNCTIONS - ( 13 Oct 2024 07:54 )  Alb: 3.0 g/dL / Pro: 6.6 gm/dL / ALK PHOS: 83 U/L / ALT: 37 U/L / AST: 38 U/L / GGT: x           Urinalysis Basic - ( 13 Oct 2024 07:54 )    Color: x / Appearance: x / SG: x / pH: x  Gluc: 105 mg/dL / Ketone: x  / Bili: x / Urobili: x   Blood: x / Protein: x / Nitrite: x   Leuk Esterase: x / RBC: x / WBC x   Sq Epi: x / Non Sq Epi: x / Bacteria: x        Radiology: all available radiological tests reviewed      IMPRESSION:  Partially visualized moderate sized complex collection in the right   scrotal wall, better depicted on the CT of the pelvis from earlier the   same day, compatible with an abscess.        IMPRESSION:  Large multilocular tubular rim-enhancing fluid collection in the right   hemiscrotum measuring up to 3 cm in diameter with pigtail-like extension   extending along the right spermatic cord. Collection extends to the skin   surface at the level of the base of the penis. Scrotal wall edema.   Constellation of findings and clinical history are most compatible with   an abscess.    Nonspecific mildly dilated fluid-filled small bowel loops in the right   lower abdomen.    Advanced directives addressed: full resuscitation

## 2024-10-13 NOTE — CONSULT NOTE ADULT - ASSESSMENT
64 yr/old withe severe scrotal abcess  =npo  -type and screen  cont IV cefepime, vancomycin  pain management  - most likely require formal Incision and drainage' of scrotum   will D/w Dr Herring

## 2024-10-13 NOTE — H&P ADULT - CONVERSATION DETAILS
Patient states his brother Dontae is HCP / Surrogate. Patient also states he prefers to be DNR, but does not want to fill out MOLST at this time, and prefers to fill it out at a later time.

## 2024-10-13 NOTE — CONSULT NOTE ADULT - SUBJECTIVE AND OBJECTIVE BOX
63 y/o Male c/c severe scrotal pain, s/p puncture by a dogs paw, left abrasion/ small laceration, pt developed progressive severe swelling, sharyn rednesss, non  fluctant PMH of COPD, CLL, lyme disease, hypothyoridism, cardiac arrest,x2 abdominal discomfort, but is vague about the symptoms. Debra, nausea, vomiting, chest pain, shortness of breath, cough, runny nose, sore throat.   T(C): 37.2 (10-13-24 @ 04:54), Max: 37.3 (10-13-24 @ 00:39)  HR: 77 (10-13-24 @ 04:54) (77 - 78)  BP: 122/84 (10-13-24 @ 04:54) (114/98 - 122/84)  RR: 18 (10-13-24 @ 04:54) (18 - 19)  SpO2: 98% (10-13-24 @ 04:54) (98% - 100%)  Wt(kg): --  	     scrotal region- severe swelling, erythema tenderness to touch, - fluctance                   10.7   27.76 )-----------( 206      ( 13 Oct 2024 01:49 )             31.9     PT/INR - ( 13 Oct 2024 02:47 )   PT: 11.6 sec;   INR: 1.01 ratio         PTT - ( 13 Oct 2024 02:47 )  PTT:33.4 sec  10-13    136  |  107  |  15  ----------------------------<  85  3.7   |  24  |  0.72    Ca    8.5      13 Oct 2024 02:47    TPro  6.8  /  Alb  3.0[L]  /  TBili  0.2  /  DBili  x   /  AST  46[H]  /  ALT  44  /  AlkPhos  102  10-13    Type and Screen Antibody Screen: NEG (10-13 @ 02:47)  	< from: US Testicles (10.13.24 @ 04:41) >  NTERPRETATION:  CLINICAL INFORMATION: Progressively worsening pain and   swelling to the right hemiscrotum after abrasion on the scrotum 5 days   ago from dog claw.    COMPARISON: CT of the pelvis from earlier the same day.    TECHNIQUE: Testicular ultrasound utilizing color and spectral Doppler.    FINDINGS:    RIGHT:  Right testis: 3.8 cm x 3.4 cm x 2.6 cm. Normal echogenicity and  echotexture with no masses or areas of architectural distortion. Normal   arterial and venous blood flow pattern.  Right epididymis: Within normal limits.  Right hydrocele: Moderate and complex.  Right varicocele: None.  Right scrotal wall: Thickened to 1.2 cm. Partially visualized complex   collection in the right scrotal wall measuring up to 6.5 cm x 2.5 cm x   4.2 cm with adjacent hyperemia, better depicted on the CT of the pelvis   from earlier the same day, compatible with an abscess.    LEFT:  Left testis: 4.5 cm x 2.8 cm x 2.8 cm. Normal echogenicity and   echotexture with no masses or areas of architectural distortion. Normal   arterial and venous blood flow pattern.  Left epididymis: 0.2 cm x 0.2 cm epididymal head cyst.  Left hydrocele: Small.  Left varicocele: None.  Left scrotal wall: Thickened to 0.6 cm.    IMPRESSION:  Partially visualized moderate sized complex collection in the right   scrotal wall, better depicted on the CT of the pelvis from earlier the   same day, compatible with an abscess.    < end of copied text >  < from: CT Pelvis w/ IV Cont (10.13.24 @ 02:18) >    INTERPRETATION:  CLINICAL INFORMATION: Progressively worsening pain and   swelling to the right hemiscrotum after abrasion on the scrotum 5 days   ago from dog claw.    COMPARISON: CT the abdomen and pelvis from 6/28/2022.    CONTRAST/COMPLICATIONS:  IV Contrast: Omnipaque 350  90 cc administered   0 cc discarded  Oral Contrast: NONE  Complications: None reported at time of study completion    PROCEDURE:  CT of the Pelvis was performed.  Sagittal and coronal reformats were performed.    FINDINGS:  BLADDER: Distended.  REPRODUCTIVE ORGANS: Prostate gland is not enlarged.  LYMPH NODES: Asymmetric enlarged right inguinal lymph nodes;   representative lymph node measuring 1.8 x 1.0 cm.    VISUALIZED PORTIONS:  ABDOMINAL ORGANS: Within normal limits.  BOWEL: Several dilated fluid-filled small bowel loops in the right lower   abdomen.  PERITONEUM/RETROPERITONEUM: Within normal limits.  VESSELS: Mild atherosclerotic calcifications.  ABDOMINAL WALL: Large multilocular tubular rim-enhancing fluid collection   in the right hemiscrotum measuring up to 3 cm in diameter with tail like   extension extending along the right spermatic cord. Collection extends to   the skin surface at the level of the base of the penis. Scrotal wall   edema.  BONES: Degenerative changes of the spine.    IMPRESSION:  Large multilocular tubular rim-enhancing fluid collection in the right   hemiscrotum measuring up to 3 cm in diameter with pigtail-like extension   extending along the right spermatic cord. Collection extends to the skin   surface at the level of the base of the penis. Scrotal wall edema.   Constellation of findings and clinical history are most compatible with   an abscess.    Nonspecific mildly dilated fluid-filled small bowel loops in the right   lower abdomen.    < end of copied text >

## 2024-10-13 NOTE — CONSULT NOTE ADULT - SUBJECTIVE AND OBJECTIVE BOX
HPI:    63 y/o M with PMHx of COPD, CLL, prior cardiac arrest presented to the ED with ongoing / worsening R scrotal pain over the past 6-7 days. He stated that a dog had jumped on him and caused a small puncture wound which initially was only a small red leslie. He admitted that over the next few days he developed worsening pain with swelling and redness / warmth.     10/13/24: Seen at bedside, no acute distress, has not follow up with Heme Onc in the past 2-3 years because of 'insurance issues', would like to return to see Dr Castaneda if possible      PAST MEDICAL & SURGICAL HISTORY:    CLL (chronic lymphocytic leukemia)    Chronic obstructive pulmonary disease (COPD)    Hypothyroidism    H/o Lyme disease    Hiatal hernia    History of herniated intervertebral disc    H/O sinus surgery    H/O shoulder surgery        MEDICATIONS  (STANDING):    cefepime  Injectable. 1000 milliGRAM(s) IV Push every 12 hours  DULoxetine 60 milliGRAM(s) Oral daily  lactated ringers. 1000 milliLiter(s) (75 mL/Hr) IV Continuous <Continuous>  predniSONE   Tablet 10 milliGRAM(s) Oral daily  sodium chloride 0.9%. 1000 milliLiter(s) (125 mL/Hr) IV Continuous <Continuous>  sodium chloride 0.9%. 750 milliLiter(s) (75 mL/Hr) IV Continuous <Continuous>  tiotropium 2.5 MICROgram(s)/olodaterol 2.5 MICROgram(s) (STIOLTO) Inhaler 2 Puff(s) Inhalation daily  traZODone 100 milliGRAM(s) Oral at bedtime  vancomycin  IVPB 1000 milliGRAM(s) IV Intermittent every 12 hours      MEDICATIONS  (PRN):    acetaminophen     Tablet .. 650 milliGRAM(s) Oral every 6 hours PRN Temp greater or equal to 38C (100.4F), Mild Pain (1 - 3)  albuterol    90 MICROgram(s) HFA Inhaler 2 Puff(s) Inhalation every 6 hours PRN Bronchospasm  fentaNYL    Injectable 50 MICROGram(s) IV Push every 5 minutes PRN Severe Pain (7 - 10)  fentaNYL    Injectable 25 MICROGram(s) IV Push every 5 minutes PRN Moderate Pain (4 - 6)  HYDROmorphone  Injectable 1 milliGRAM(s) IV Push every 4 hours PRN Severe Pain (7 - 10)  ondansetron Injectable 4 milliGRAM(s) IV Push every 6 hours PRN Nausea and/or Vomiting  ondansetron Injectable 4 milliGRAM(s) IV Push once PRN Nausea and/or Vomiting      ALLERGIES:    No Known Allergies        FAMILY HISTORY:    lung cancer (Mother)  brain cancer (Mother)  Alzheimer's disease (Father)      REVIEW OF SYSTEMS:    Constitutional, Eyes, ENT, Cardiovascular, Respiratory, Gastrointestinal, Genitourinary, Musculoskeletal, Integumentary, Neurological, Psychiatric, Endocrine, Heme/Lymph and Allergic/Immunologic review of systems are otherwise negative except as noted in HPI.       VITALS:    T(C): 36.6 (13 Oct 2024 08:24), Max: 37.3 (13 Oct 2024 00:39)  T(F): 97.9 (13 Oct 2024 08:24), Max: 99.2 (13 Oct 2024 00:39)  HR: 63 (13 Oct 2024 08:24) (62 - 78)  BP: 108/73 (13 Oct 2024 08:24) (108/73 - 122/84)  BP(mean): 102 (13 Oct 2024 00:39) (102 - 102)  RR: 16 (13 Oct 2024 08:24) (16 - 19)  SpO2: 95% (13 Oct 2024 08:24) (95% - 100%)    Parameters below as of 13 Oct 2024 08:24  Patient On (Oxygen Delivery Method): room air        PHYSICAL:    Constitutional: no acute distress, overall disheveled   Eyes: no conjunctival infection, anicteric.   ENT: pharynx is unremarkable  Neck: supple without JVD  Pulmonary: clear to auscultation bilaterally  Cardiac: RRR  Vascular: no calf tenderness, venous stasis changes  Abdomen: normoactive bowel sounds, soft and nontender, no hepatosplenomegaly or masses appreciated.   Lymphatic: no peripheral adenopathy appreciated.   Musculoskeletal: full range of motion and no deformities appreciated.   Skin: scrotum with erythema and swelling R > L side    Neurology: awake, alert, oriented; no obvious focal deficits        LABS:    CBC Full  -  ( 13 Oct 2024 07:54 )  WBC Count : 29.52 K/uL  RBC Count : 2.94 M/uL  Hemoglobin : 11.0 g/dL  Hematocrit : 32.2 %  Platelet Count - Automated : 204 K/uL  Mean Cell Volume : 109.5 fl  Mean Cell Hemoglobin : 37.4 pg  Mean Cell Hemoglobin Concentration : 34.2 gm/dL  Auto Neutrophil # : 8.03 K/uL  Auto Lymphocyte # : 21.03 K/uL  Auto Monocyte # : 0.19 K/uL  Auto Eosinophil # : 0.04 K/uL  Auto Basophil # : 0.09 K/uL  Auto Neutrophil % : 27.3 %  Auto Lymphocyte % : 71.2 %  Auto Monocyte % : 0.6 %  Auto Eosinophil % : 0.1 %  Auto Basophil % : 0.3 %    10-13    138  |  109[H]  |  13  ----------------------------<  105[H]  3.9   |  22  |  0.67    Ca    8.4[L]      13 Oct 2024 07:54    TPro  6.6  /  Alb  3.0[L]  /  TBili  0.2  /  DBili  x   /  AST  38[H]  /  ALT  37  /  AlkPhos  83  10-13    PT/INR - ( 13 Oct 2024 07:54 )   PT: 11.6 sec;   INR: 0.98 ratio         PTT - ( 13 Oct 2024 07:54 )  PTT:34.3 sec  Urinalysis Basic - ( 13 Oct 2024 07:54 )    Color: x / Appearance: x / SG: x / pH: x  Gluc: 105 mg/dL / Ketone: x  / Bili: x / Urobili: x   Blood: x / Protein: x / Nitrite: x   Leuk Esterase: x / RBC: x / WBC x   Sq Epi: x / Non Sq Epi: x / Bacteria: x        RADIOLOGY & ADDITIONAL STUDIES:      CT Pelvis w/ IV Cont (10.13.24 @ 02:18)    FINDINGS:  BLADDER: Distended.  REPRODUCTIVE ORGANS: Prostate gland is not enlarged.  LYMPH NODES: Asymmetric enlarged right inguinal lymph nodes;   representative lymph node measuring 1.8 x 1.0 cm.    VISUALIZED PORTIONS:  ABDOMINAL ORGANS: Within normal limits.  BOWEL: Several dilated fluid-filled small bowel loops in the right lower   abdomen.  PERITONEUM/RETROPERITONEUM: Within normal limits.  VESSELS: Mild atherosclerotic calcifications.  ABDOMINAL WALL: Large multilocular tubular rim-enhancing fluid collection   in the right hemiscrotum measuring up to 3 cm in diameter with tail like   extension extending along the right spermatic cord. Collection extends to   the skin surface at the level of the base of the penis. Scrotal wall   edema.  BONES: Degenerative changes of the spine.    IMPRESSION:    Large multilocular tubular rim-enhancing fluid collection in the right   hemiscrotum measuring up to 3 cm in diameter with pigtail-like extension   extending along the right spermatic cord. Collection extends to the skin   surface at the level of the base of the penis. Scrotal wall edema.   Constellation of findings and clinical history are most compatible with   an abscess.    Nonspecific mildly dilated fluid-filled small bowel loops in the right   lower abdomen.          US Testicles (10.13.24 @ 04:41)    FINDINGS:    RIGHT:  Right testis: 3.8 cm x 3.4 cm x 2.6 cm. Normal echogenicity and  echotexture with no masses or areas of architectural distortion. Normal   arterial and venous blood flow pattern.  Right epididymis: Within normal limits.  Right hydrocele: Moderate and complex.  Right varicocele: None.  Right scrotal wall: Thickened to 1.2 cm. Partially visualized complex   collection in the right scrotal wall measuring up to 6.5 cm x 2.5 cm x   4.2 cm with adjacent hyperemia, better depicted on the CT of the pelvis   from earlier the same day, compatible with an abscess.    LEFT:  Left testis: 4.5 cm x 2.8 cm x 2.8 cm. Normal echogenicity and   echotexture with no masses or areas of architectural distortion. Normal   arterial and venous blood flow pattern.  Left epididymis: 0.2 cm x 0.2 cm epididymal head cyst.  Left hydrocele: Small.  Left varicocele: None.  Left scrotal wall: Thickened to 0.6 cm.    IMPRESSION:    Partially visualized moderate sized complex collection in the right   scrotal wall, better depicted on the CT of the pelvis from earlier the   same day, compatible with an abscess.

## 2024-10-14 LAB
ANION GAP SERPL CALC-SCNC: 5 MMOL/L — SIGNIFICANT CHANGE UP (ref 5–17)
BUN SERPL-MCNC: 8 MG/DL — SIGNIFICANT CHANGE UP (ref 7–23)
CALCIUM SERPL-MCNC: 8.4 MG/DL — LOW (ref 8.5–10.1)
CHLORIDE SERPL-SCNC: 103 MMOL/L — SIGNIFICANT CHANGE UP (ref 96–108)
CO2 SERPL-SCNC: 26 MMOL/L — SIGNIFICANT CHANGE UP (ref 22–31)
CREAT SERPL-MCNC: 0.72 MG/DL — SIGNIFICANT CHANGE UP (ref 0.5–1.3)
EGFR: 103 ML/MIN/1.73M2 — SIGNIFICANT CHANGE UP
FERRITIN SERPL-MCNC: 731 NG/ML — HIGH (ref 30–400)
FOLATE SERPL-MCNC: 7.1 NG/ML — SIGNIFICANT CHANGE UP
GLUCOSE SERPL-MCNC: 97 MG/DL — SIGNIFICANT CHANGE UP (ref 70–99)
GRAM STN FLD: ABNORMAL
HAPTOGLOB SERPL-MCNC: 378 MG/DL — HIGH (ref 34–200)
HCT VFR BLD CALC: 28 % — LOW (ref 39–50)
HGB BLD-MCNC: 9.4 G/DL — LOW (ref 13–17)
IRON SATN MFR SERPL: 22 UG/DL — LOW (ref 45–165)
IRON SATN MFR SERPL: 9 % — LOW (ref 16–55)
MCHC RBC-ENTMCNC: 33.6 GM/DL — SIGNIFICANT CHANGE UP (ref 32–36)
MCHC RBC-ENTMCNC: 36.9 PG — HIGH (ref 27–34)
MCV RBC AUTO: 109.8 FL — HIGH (ref 80–100)
PLATELET # BLD AUTO: 219 K/UL — SIGNIFICANT CHANGE UP (ref 150–400)
POTASSIUM SERPL-MCNC: 3.7 MMOL/L — SIGNIFICANT CHANGE UP (ref 3.5–5.3)
POTASSIUM SERPL-SCNC: 3.7 MMOL/L — SIGNIFICANT CHANGE UP (ref 3.5–5.3)
RBC # BLD: 2.55 M/UL — LOW (ref 4.2–5.8)
RBC # FLD: 13.5 % — SIGNIFICANT CHANGE UP (ref 10.3–14.5)
SODIUM SERPL-SCNC: 134 MMOL/L — LOW (ref 135–145)
SPECIMEN SOURCE: SIGNIFICANT CHANGE UP
TIBC SERPL-MCNC: 234 UG/DL — SIGNIFICANT CHANGE UP (ref 220–430)
UIBC SERPL-MCNC: 212 UG/DL — SIGNIFICANT CHANGE UP (ref 110–370)
VIT B12 SERPL-MCNC: 607 PG/ML — SIGNIFICANT CHANGE UP (ref 232–1245)
WBC # BLD: 19.23 K/UL — HIGH (ref 3.8–10.5)
WBC # FLD AUTO: 19.23 K/UL — HIGH (ref 3.8–10.5)

## 2024-10-14 PROCEDURE — 99233 SBSQ HOSP IP/OBS HIGH 50: CPT

## 2024-10-14 RX ORDER — VANCOMYCIN HYDROCHLORIDE 50 MG/ML
500 KIT ORAL EVERY 12 HOURS
Refills: 0 | Status: DISCONTINUED | OUTPATIENT
Start: 2024-10-14 | End: 2024-10-16

## 2024-10-14 RX ORDER — SODIUM CHLORIDE 9 MG/ML
500 INJECTION, SOLUTION INTRAMUSCULAR; INTRAVENOUS; SUBCUTANEOUS ONCE
Refills: 0 | Status: COMPLETED | OUTPATIENT
Start: 2024-10-14 | End: 2024-10-14

## 2024-10-14 RX ORDER — SODIUM CHLORIDE 9 MG/ML
1000 INJECTION, SOLUTION INTRAMUSCULAR; INTRAVENOUS; SUBCUTANEOUS
Refills: 0 | Status: DISCONTINUED | OUTPATIENT
Start: 2024-10-14 | End: 2024-10-19

## 2024-10-14 RX ADMIN — TRAZODONE HYDROCHLORIDE 100 MILLIGRAM(S): 100 TABLET ORAL at 21:36

## 2024-10-14 RX ADMIN — VANCOMYCIN HYDROCHLORIDE 100 MILLIGRAM(S): KIT at 21:35

## 2024-10-14 RX ADMIN — SODIUM CHLORIDE 125 MILLILITER(S): 9 INJECTION, SOLUTION INTRAMUSCULAR; INTRAVENOUS; SUBCUTANEOUS at 10:47

## 2024-10-14 RX ADMIN — TIOTROPIUM BROMIDE AND OLODATEROL 2 PUFF(S): 3.124; 2.736 SPRAY, METERED RESPIRATORY (INHALATION) at 09:49

## 2024-10-14 RX ADMIN — OXYCODONE HYDROCHLORIDE 5 MILLIGRAM(S): 30 TABLET ORAL at 09:20

## 2024-10-14 RX ADMIN — PIPERACILLIN AND TAZOBACTAM 25 GRAM(S): .5; 4 INJECTION, POWDER, LYOPHILIZED, FOR SOLUTION INTRAVENOUS at 21:36

## 2024-10-14 RX ADMIN — OXYCODONE HYDROCHLORIDE 5 MILLIGRAM(S): 30 TABLET ORAL at 10:20

## 2024-10-14 RX ADMIN — SODIUM CHLORIDE 125 MILLILITER(S): 9 INJECTION, SOLUTION INTRAMUSCULAR; INTRAVENOUS; SUBCUTANEOUS at 08:42

## 2024-10-14 RX ADMIN — SODIUM CHLORIDE 500 MILLILITER(S): 9 INJECTION, SOLUTION INTRAMUSCULAR; INTRAVENOUS; SUBCUTANEOUS at 09:18

## 2024-10-14 RX ADMIN — DULOXETINE HYDROCHLORIDE 60 MILLIGRAM(S): 30 CAPSULE, DELAYED RELEASE ORAL at 09:21

## 2024-10-14 RX ADMIN — OXYCODONE HYDROCHLORIDE 5 MILLIGRAM(S): 30 TABLET ORAL at 16:22

## 2024-10-14 RX ADMIN — VANCOMYCIN HYDROCHLORIDE 100 MILLIGRAM(S): KIT at 10:46

## 2024-10-14 RX ADMIN — OXYCODONE HYDROCHLORIDE 5 MILLIGRAM(S): 30 TABLET ORAL at 17:09

## 2024-10-14 RX ADMIN — SODIUM CHLORIDE 1000 MILLILITER(S): 9 INJECTION, SOLUTION INTRAMUSCULAR; INTRAVENOUS; SUBCUTANEOUS at 11:26

## 2024-10-14 RX ADMIN — PIPERACILLIN AND TAZOBACTAM 25 GRAM(S): .5; 4 INJECTION, POWDER, LYOPHILIZED, FOR SOLUTION INTRAVENOUS at 06:21

## 2024-10-14 RX ADMIN — Medication 10 MILLIGRAM(S): at 09:21

## 2024-10-14 RX ADMIN — PIPERACILLIN AND TAZOBACTAM 25 GRAM(S): .5; 4 INJECTION, POWDER, LYOPHILIZED, FOR SOLUTION INTRAVENOUS at 13:49

## 2024-10-14 RX ADMIN — Medication 75 MICROGRAM(S): at 06:20

## 2024-10-14 NOTE — PROGRESS NOTE ADULT - SUBJECTIVE AND OBJECTIVE BOX
Patient seen at bedside, pt is POD 1 s/p scrotal abscess I&D. Patient reports he feels well and notes some pain at the incision site but denies any fevers, chills.    PE  VITALS  T(C): 36.8 (10-14-24 @ 08:26), Max: 36.8 (10-14-24 @ 00:40)  HR: 61 (10-14-24 @ 10:55) (51 - 74)  BP: 83/56 (10-14-24 @ 10:55) (81/59 - 130/97)  RR: 16 (10-14-24 @ 08:26) (12 - 20)  SpO2: 96% (10-14-24 @ 09:51) (93% - 98%)     Lung    : No resp distress  Abdo:   : Soft, Non tender, No guarding, No distension   Back    : No CVAT b/l  Extremity: No calf tenderness   Genitalia Male: right sided scrotal incision with packing and gauze dressing in pace with some blood but no active bleed, No crepitation, No fluctuance   Neuro   : A&Ox3      LABS                        9.4    19.23 )-----------( 219      ( 14 Oct 2024 06:56 )             28.0   10-14    134[L]  |  103  |  8   ----------------------------<  97  3.7   |  26  |  0.72    Ca    8.4[L]      14 Oct 2024 06:56    TPro  6.6  /  Alb  3.0[L]  /  TBili  0.2  /  DBili  x   /  AST  38[H]  /  ALT  37  /  AlkPhos  83  10-13

## 2024-10-14 NOTE — PROGRESS NOTE ADULT - SUBJECTIVE AND OBJECTIVE BOX
Date of service: 10-14-24 @ 09:44    pt seen and examined  s/p I & D of scrotal abscess 10/13  has pain, discomfort  afebrile    ROS: no fever or chills; denies dizziness, no HA, no SOB or cough, no abdominal pain, no diarrhea or constipation;no legs pain, no rashes    MEDICATIONS  (STANDING):  DULoxetine 60 milliGRAM(s) Oral daily  levothyroxine 75 MICROGram(s) Oral daily  piperacillin/tazobactam IVPB.. 3.375 Gram(s) IV Intermittent every 8 hours  predniSONE   Tablet 10 milliGRAM(s) Oral daily  sodium chloride 0.9%. 1000 milliLiter(s) (125 mL/Hr) IV Continuous <Continuous>  sodium chloride 0.9%. 750 milliLiter(s) (75 mL/Hr) IV Continuous <Continuous>  tiotropium 2.5 MICROgram(s)/olodaterol 2.5 MICROgram(s) (STIOLTO) Inhaler 2 Puff(s) Inhalation daily  traZODone 100 milliGRAM(s) Oral at bedtime  vancomycin  IVPB 500 milliGRAM(s) IV Intermittent every 12 hours    Vital Signs Last 24 Hrs  T(C): 36.8 (14 Oct 2024 08:26), Max: 36.8 (14 Oct 2024 00:40)  T(F): 98.2 (14 Oct 2024 08:26), Max: 98.2 (14 Oct 2024 00:40)  HR: 54 (14 Oct 2024 08:26) (51 - 74)  BP: 81/59 (14 Oct 2024 08:26) (81/59 - 130/97)  BP(mean): --  RR: 16 (14 Oct 2024 08:26) (12 - 20)  SpO2: 98% (14 Oct 2024 08:26) (93% - 98%)    Parameters below as of 14 Oct 2024 08:26  Patient On (Oxygen Delivery Method): nasal cannula    PE:  Constitutional: NAD  HEENT: NC/AT, EOMI, PERRLA, conjunctivae clear; ears and nose atraumatic; pharynx benign  Neck: supple; thyroid not palpable  Back: no tenderness  Respiratory: respiratory effort normal; clear to auscultation  Cardiovascular: S1S2 regular, no murmurs  Abdomen: soft, not tender, not distended, positive BS; liver and spleen WNL  Genitourinary: no suprapubic tenderness; scrotal edema tenderness warmth erythema   Lymphatic: no LN palpable  Musculoskeletal: no muscle tenderness, no joint swelling or tenderness  Extremities: no pedal edema  Neurological/ Psychiatric: AxOx3, Judgement and insight normal;  moving all extremities  Skin: no rashes; no palpable lesions    Labs: all available labs reviewed                                   9.4    19.23 )-----------( 219      ( 14 Oct 2024 06:56 )             28.0     10-14    134[L]  |  103  |  8   ----------------------------<  97  3.7   |  26  |  0.72    Ca    8.4[L]      14 Oct 2024 06:56    TPro  6.6  /  Alb  3.0[L]  /  TBili  0.2  /  DBili  x   /  AST  38[H]  /  ALT  37  /  AlkPhos  83  10-13         Urinalysis Basic - ( 13 Oct 2024 07:54 )    Color: x / Appearance: x / SG: x / pH: x  Gluc: 105 mg/dL / Ketone: x  / Bili: x / Urobili: x   Blood: x / Protein: x / Nitrite: x   Leuk Esterase: x / RBC: x / WBC x   Sq Epi: x / Non Sq Epi: x / Bacteria: x    Culture - Abscess with Gram Stain (10.13.24 @ 14:35)   Gram Stain:   No polymorphonuclear cells seen per low power field   Few Gram Positive Cocci in Clusters seen per oil power field  Specimen Source: .Abscess  Urinalysis with Rflx Culture (10.13.24 @ 05:05)   Urine Appearance: Clear  Color: Yellow  Specific Gravity: >1.030  pH Urine: 6.5  Protein, Urine: Negative mg/dL  Glucose Qualitative, Urine: Negative mg/dL  Ketone - Urine: Negative mg/dL  Blood, Urine: Negative  Bilirubin: Negative  Urobilinogen: 0.2 mg/dL  Leukocyte Esterase Concentration: Negative  Nitrite: Negative    Radiology: all available radiological tests reviewed      IMPRESSION:  Partially visualized moderate sized complex collection in the right   scrotal wall, better depicted on the CT of the pelvis from earlier the   same day, compatible with an abscess.        IMPRESSION:  Large multilocular tubular rim-enhancing fluid collection in the right   hemiscrotum measuring up to 3 cm in diameter with pigtail-like extension   extending along the right spermatic cord. Collection extends to the skin   surface at the level of the base of the penis. Scrotal wall edema.   Constellation of findings and clinical history are most compatible with   an abscess.    Nonspecific mildly dilated fluid-filled small bowel loops in the right   lower abdomen.    Advanced directives addressed: full resuscitation

## 2024-10-14 NOTE — PROGRESS NOTE ADULT - ASSESSMENT
65 y/o M w/ PMH of COPD, CLL, lyme disease, hypothyoridism, cardiac arrest, p/w scrotal pain. Patient states that five days ago a dog jumped on him and cause a small puncture wound to his scrotum. Initially, it was only a small red leslie, but then it started to progress over time and increased in swelling and pain.  Patient also, complains about intermittent abdominal discomfort, but is vague about the symptoms. Denies, nausea, vomiting, chest pain, shortness of breath, cough, runny nose, sore throat. As per ED physician, patient was seen by the surgery p a covering for urology team in the emergency room. Wbc ct 29 UA positive, imaging shows Large multilocular tubular rim-enhancing fluid collection in the right hemiscrotum measuring up to 3 cm in diameter with pigtail-like extension extending along the right spermatic cord. Collection extends to the skin surface at the level of the base of the penis. Scrotal wall edema. Started on IV abx, urology consulted.     1. Severe scrotal abscess/cellulitis. Pyuria. Complicated UTI. CLL. Leukocytosis. s/p Dog bite  - imaging reviewed  - dc vancomycin/cefepime  - on IV zosyn [id number]  #2  - add vancomycin [id number] check trough prior to 4th dose  - continue with antibiotic coverage   - f/u urine cx blood cx   - urology eval appreciated s/p I & D f/u cultures   - tolerating abx well so far; no side effects noted  - reason for abx use and side effects reviewed with patient  - supportive care  - fu cbc    Clinical team may change from intravenous to oral antibiotics when the following criteria are met:   1. Patient is clinically improving/stable       a)	Improved signs and symptoms of infection from initial presentation       b)	Afebrile for 24 hours       c)	Leukocytosis trending towards normal range   2. Patient is tolerating oral intake   3. Initial/repeat blood cultures are negative     Cannot advise changing to oral antibiotic therapy until culture sensitivity is available.

## 2024-10-14 NOTE — PROGRESS NOTE ADULT - SUBJECTIVE AND OBJECTIVE BOX
HOSPITALIST ATTENDING PROGRESS NOTE    Chart and meds reviewed.  Patient seen and examined.    CC: Scrotal abscess    Subjective: No acute issues overnight. SP I&D.     All other systems reviewed and found to be negative with the exception of what has been described above.    MEDICATIONS  (STANDING):  DULoxetine 60 milliGRAM(s) Oral daily  levothyroxine 75 MICROGram(s) Oral daily  piperacillin/tazobactam IVPB.. 3.375 Gram(s) IV Intermittent every 8 hours  predniSONE   Tablet 10 milliGRAM(s) Oral daily  sodium chloride 0.9%. 750 milliLiter(s) (75 mL/Hr) IV Continuous <Continuous>  sodium chloride 0.9%. 1000 milliLiter(s) (125 mL/Hr) IV Continuous <Continuous>  tiotropium 2.5 MICROgram(s)/olodaterol 2.5 MICROgram(s) (STIOLTO) Inhaler 2 Puff(s) Inhalation daily  traZODone 100 milliGRAM(s) Oral at bedtime  vancomycin  IVPB 500 milliGRAM(s) IV Intermittent every 12 hours    MEDICATIONS  (PRN):  acetaminophen     Tablet .. 650 milliGRAM(s) Oral every 6 hours PRN Temp greater or equal to 38C (100.4F), Mild Pain (1 - 3)  albuterol    90 MICROgram(s) HFA Inhaler 2 Puff(s) Inhalation every 6 hours PRN Bronchospasm  HYDROmorphone  Injectable 1 milliGRAM(s) IV Push every 4 hours PRN Severe Pain (7 - 10)  ondansetron Injectable 4 milliGRAM(s) IV Push every 6 hours PRN Nausea and/or Vomiting  oxyCODONE    IR 5 milliGRAM(s) Oral every 4 hours PRN Moderate Pain (4 - 6)    Vital Signs Last 24 Hrs  T(C): 36.8 (14 Oct 2024 08:26), Max: 36.8 (14 Oct 2024 00:40)  T(F): 98.2 (14 Oct 2024 08:26), Max: 98.2 (14 Oct 2024 00:40)  HR: 60 (14 Oct 2024 09:51) (51 - 74)  BP: 81/59 (14 Oct 2024 08:26) (81/59 - 130/97)  RR: 16 (14 Oct 2024 08:26) (12 - 20)  SpO2: 96% (14 Oct 2024 09:51) (93% - 98%)    Parameters below as of 14 Oct 2024 09:51  Patient On (Oxygen Delivery Method): nasal cannula    GEN: NAD  HEENT:  pupils equal and reactive, EOMI, no oropharyngeal lesions, erythema, exudates, oral thrush  NECK:   supple, no carotid bruits  CV:  +S1, +S2, regular, no murmurs  RESP:   lungs clear to auscultation bilaterally, no wheezing, rales, rhonchi, good air entry bilaterally  GI:  abdomen soft, non-tender, non-distended, normal BS  EXT:  no clubbing, no cyanosis, no edema, no calf pain, swelling or erythema  NEURO:  AAOX3, no focal neurological deficits, follows all commands, able to move extremities spontaneously  SKIN:  no ulcers, lesions or rashes  : Scrotal abscess sp ID with sutures    LABS:                          9.4    19.23 )-----------( 219      ( 14 Oct 2024 06:56 )             28.0     10-14    134[L]  |  103  |  8   ----------------------------<  97  3.7   |  26  |  0.72    Ca    8.4[L]      14 Oct 2024 06:56    TPro  6.6  /  Alb  3.0[L]  /  TBili  0.2  /  DBili  x   /  AST  38[H]  /  ALT  37  /  AlkPhos  83  10-13    LIVER FUNCTIONS - ( 13 Oct 2024 07:54 )  Alb: 3.0 g/dL / Pro: 6.6 gm/dL / ALK PHOS: 83 U/L / ALT: 37 U/L / AST: 38 U/L / GGT: x           PT/INR - ( 13 Oct 2024 07:54 )   PT: 11.6 sec;   INR: 0.98 ratio    PTT - ( 13 Oct 2024 07:54 )  PTT:34.3 sec    Urinalysis Basic - ( 14 Oct 2024 06:56 )  Color: x / Appearance: x / SG: x / pH: x  Gluc: 97 mg/dL / Ketone: x  / Bili: x / Urobili: x   Blood: x / Protein: x / Nitrite: x   Leuk Esterase: x / RBC: x / WBC x   Sq Epi: x / Non Sq Epi: x / Bacteria: x     US Testicles (10.13.24 @ 04:41) >  IMPRESSION:  Partially visualized moderate sized complex collection in the right   scrotal wall, better depicted on the CT of the pelvis from earlier the   same day, compatible with an abscess.     CT Pelvis w/ IV Cont (10.13.24 @ 02:18) >  IMPRESSION:  Large multilocular tubular rim-enhancing fluid collection in the right   hemiscrotum measuring up to 3 cm in diameter with pigtail-like extension   extending along the right spermatic cord. Collection extends to the skin   surface at the level of the base of the penis. Scrotal wall edema.   Constellation of findings and clinical history are most compatible with   an abscess.    Nonspecific mildly dilated fluid-filled small bowel loops in the right   lower abdomen.

## 2024-10-14 NOTE — PROGRESS NOTE ADULT - ASSESSMENT
65 y/o M w/ PMH of COPD, CLL, lyme disease, hypothyoridism, cardiac arrest, p/w scrotal pain. Patient states that five days ago a dog jumped on him and cause a small puncture wound to his scrotum. Initially, it was only a small red leslie, but then it started to progress over time and increased in swelling and pain. Patient denies any fever or chills. Patient also, complains about intermittent abdominal discomfort, but is vague about the symptoms. Debra, nausea, vomiting, chest pain, shortness of breath, cough, runny nose, sore throat. As per ED physician, patient was seen by the surgery p a covering for urology team in the emergency room    # Scrotal abscess  - SP Vanco & Cefepime   -ID consult  - SP I&D  - Now on zosyn and vanco  - FU OR cultures  -Scrotal elevation  - FU blood cultures    # Leukocytosis  - Unknown baseline WBC with CLL  - Likely multifactorial  - Oncology on board  - Improving    # Hypothyroid  - Levothyroxine 75 mg po q am    # Anemia  - Likely ACD  - Follow up outpatient    # COPD  - Continue spiriva  - Continue prednisone    # Depression  - Continue duloxetine    # Insomnia  - Continue trazodone    # DVT ppx  -SCDs 2/2 possible procedure in AM     # Disposition: Monitor, FU cultures   65 y/o M w/ PMH of COPD, CLL, lyme disease, hypothyoridism, cardiac arrest, p/w scrotal pain. Patient states that five days ago a dog jumped on him and cause a small puncture wound to his scrotum. Initially, it was only a small red leslie, but then it started to progress over time and increased in swelling and pain. Patient denies any fever or chills. Patient also, complains about intermittent abdominal discomfort, but is vague about the symptoms. Debra, nausea, vomiting, chest pain, shortness of breath, cough, runny nose, sore throat. As per ED physician, patient was seen by the surgery p a covering for urology team in the emergency room    # Scrotal abscess  - SP Vanco & Cefepime   -ID consult  - SP I&D of scrotal abscess   - Now on zosyn and vanco  - FU OR cultures  -Scrotal elevation  - FU blood cultures    # Hypotension  - Baseline /60  - Give 1liter bolus 10/14  - Continue maintenance at 125  - Asymptomatic     # Leukocytosis  - Unknown baseline WBC with CLL  - Likely multifactorial  - Oncology on board  - Improving    # Hypothyroid  - Levothyroxine 75 mg po q am    # Anemia  - Likely ACD  - Follow up outpatient    # COPD  - Continue spiriva  - Continue prednisone    # Depression  - Continue duloxetine    # Insomnia  - Continue trazodone    # DVT ppx  -SCDs    # Disposition: Monitor BP, FU cultures

## 2024-10-14 NOTE — PROGRESS NOTE ADULT - ASSESSMENT
63 yo male s/p Incision and drainage of scrotal abscess. Doing well post op.  Packing in place, gauze dressing on top changed at bedside.  Continue antibiotics, adjust as per cultures/ sensitivities    Follow up with Dr. Herring within a week for scrotal packing removal and wound check    Case discussed with Dr. Herring

## 2024-10-15 LAB
-  CLINDAMYCIN: SIGNIFICANT CHANGE UP
-  DAPTOMYCIN: SIGNIFICANT CHANGE UP
-  ERYTHROMYCIN: SIGNIFICANT CHANGE UP
-  GENTAMICIN: SIGNIFICANT CHANGE UP
-  LINEZOLID: SIGNIFICANT CHANGE UP
-  OXACILLIN: SIGNIFICANT CHANGE UP
-  PENICILLIN: SIGNIFICANT CHANGE UP
-  RIFAMPIN: SIGNIFICANT CHANGE UP
-  TETRACYCLINE: SIGNIFICANT CHANGE UP
-  TRIMETHOPRIM/SULFAMETHOXAZOLE: SIGNIFICANT CHANGE UP
-  VANCOMYCIN: SIGNIFICANT CHANGE UP
ANION GAP SERPL CALC-SCNC: 6 MMOL/L — SIGNIFICANT CHANGE UP (ref 5–17)
BUN SERPL-MCNC: 9 MG/DL — SIGNIFICANT CHANGE UP (ref 7–23)
CALCIUM SERPL-MCNC: 9.1 MG/DL — SIGNIFICANT CHANGE UP (ref 8.5–10.1)
CHLORIDE SERPL-SCNC: 109 MMOL/L — HIGH (ref 96–108)
CO2 SERPL-SCNC: 26 MMOL/L — SIGNIFICANT CHANGE UP (ref 22–31)
CREAT SERPL-MCNC: 0.97 MG/DL — SIGNIFICANT CHANGE UP (ref 0.5–1.3)
EGFR: 88 ML/MIN/1.73M2 — SIGNIFICANT CHANGE UP
GLUCOSE SERPL-MCNC: 97 MG/DL — SIGNIFICANT CHANGE UP (ref 70–99)
HCT VFR BLD CALC: 29.4 % — LOW (ref 39–50)
HGB BLD-MCNC: 9.8 G/DL — LOW (ref 13–17)
MCHC RBC-ENTMCNC: 33.3 GM/DL — SIGNIFICANT CHANGE UP (ref 32–36)
MCHC RBC-ENTMCNC: 37 PG — HIGH (ref 27–34)
MCV RBC AUTO: 110.9 FL — HIGH (ref 80–100)
METHOD TYPE: SIGNIFICANT CHANGE UP
PLATELET # BLD AUTO: 237 K/UL — SIGNIFICANT CHANGE UP (ref 150–400)
POTASSIUM SERPL-MCNC: 3.8 MMOL/L — SIGNIFICANT CHANGE UP (ref 3.5–5.3)
POTASSIUM SERPL-SCNC: 3.8 MMOL/L — SIGNIFICANT CHANGE UP (ref 3.5–5.3)
RBC # BLD: 2.65 M/UL — LOW (ref 4.2–5.8)
RBC # FLD: 13.7 % — SIGNIFICANT CHANGE UP (ref 10.3–14.5)
SODIUM SERPL-SCNC: 141 MMOL/L — SIGNIFICANT CHANGE UP (ref 135–145)
VANCOMYCIN TROUGH SERPL-MCNC: 5.4 UG/ML — LOW (ref 10–20)
VANCOMYCIN TROUGH SERPL-MCNC: 6 UG/ML — LOW (ref 10–20)
WBC # BLD: 24.32 K/UL — HIGH (ref 3.8–10.5)
WBC # FLD AUTO: 24.32 K/UL — HIGH (ref 3.8–10.5)

## 2024-10-15 PROCEDURE — 99232 SBSQ HOSP IP/OBS MODERATE 35: CPT

## 2024-10-15 PROCEDURE — 88189 FLOWCYTOMETRY/READ 16 & >: CPT | Mod: 59

## 2024-10-15 RX ADMIN — PIPERACILLIN AND TAZOBACTAM 25 GRAM(S): .5; 4 INJECTION, POWDER, LYOPHILIZED, FOR SOLUTION INTRAVENOUS at 16:09

## 2024-10-15 RX ADMIN — Medication 1 MILLIGRAM(S): at 14:27

## 2024-10-15 RX ADMIN — SODIUM CHLORIDE 125 MILLILITER(S): 9 INJECTION, SOLUTION INTRAMUSCULAR; INTRAVENOUS; SUBCUTANEOUS at 05:31

## 2024-10-15 RX ADMIN — OXYCODONE HYDROCHLORIDE 5 MILLIGRAM(S): 30 TABLET ORAL at 23:00

## 2024-10-15 RX ADMIN — OXYCODONE HYDROCHLORIDE 5 MILLIGRAM(S): 30 TABLET ORAL at 12:24

## 2024-10-15 RX ADMIN — VANCOMYCIN HYDROCHLORIDE 100 MILLIGRAM(S): KIT at 21:58

## 2024-10-15 RX ADMIN — PIPERACILLIN AND TAZOBACTAM 25 GRAM(S): .5; 4 INJECTION, POWDER, LYOPHILIZED, FOR SOLUTION INTRAVENOUS at 23:31

## 2024-10-15 RX ADMIN — Medication 1 MILLIGRAM(S): at 14:57

## 2024-10-15 RX ADMIN — PIPERACILLIN AND TAZOBACTAM 25 GRAM(S): .5; 4 INJECTION, POWDER, LYOPHILIZED, FOR SOLUTION INTRAVENOUS at 05:31

## 2024-10-15 RX ADMIN — TIOTROPIUM BROMIDE AND OLODATEROL 2 PUFF(S): 3.124; 2.736 SPRAY, METERED RESPIRATORY (INHALATION) at 08:50

## 2024-10-15 RX ADMIN — OXYCODONE HYDROCHLORIDE 5 MILLIGRAM(S): 30 TABLET ORAL at 11:54

## 2024-10-15 RX ADMIN — OXYCODONE HYDROCHLORIDE 5 MILLIGRAM(S): 30 TABLET ORAL at 22:11

## 2024-10-15 RX ADMIN — SODIUM CHLORIDE 125 MILLILITER(S): 9 INJECTION, SOLUTION INTRAMUSCULAR; INTRAVENOUS; SUBCUTANEOUS at 21:59

## 2024-10-15 RX ADMIN — DULOXETINE HYDROCHLORIDE 60 MILLIGRAM(S): 30 CAPSULE, DELAYED RELEASE ORAL at 11:47

## 2024-10-15 RX ADMIN — VANCOMYCIN HYDROCHLORIDE 100 MILLIGRAM(S): KIT at 11:47

## 2024-10-15 RX ADMIN — Medication 75 MICROGRAM(S): at 05:31

## 2024-10-15 RX ADMIN — TRAZODONE HYDROCHLORIDE 100 MILLIGRAM(S): 100 TABLET ORAL at 21:59

## 2024-10-15 RX ADMIN — Medication 10 MILLIGRAM(S): at 11:47

## 2024-10-15 RX ADMIN — SODIUM CHLORIDE 125 MILLILITER(S): 9 INJECTION, SOLUTION INTRAMUSCULAR; INTRAVENOUS; SUBCUTANEOUS at 16:24

## 2024-10-15 NOTE — PHYSICAL THERAPY INITIAL EVALUATION ADULT - LEVEL OF INDEPENDENCE, REHAB EVAL
pt preferred NOT to perform ROLLING as did not want to compress scrotum ,making extra effort to keep thighs apart during movement/transitions/unable to perform

## 2024-10-15 NOTE — PROGRESS NOTE ADULT - ASSESSMENT
65 y/o M with known CLL from a few years ago, followed with Heme Onc Dr Castaneda but has not been seen in the office in 2-3 years currently admitted with scrotal swelling / infection      # Leukocytosis & Macrocytic Anemia with Known CLL    - WBC count currently -24 K/ul  - Patient has known CLL from prior, has not followed up outpatient in a few years  - Baseline WBC count from 2022 seems to be about 15 - 18K  - Hgb currently ~9.8 g/dL   - MCV >110  - Currently admitted with acute scrotal swelling / pain; Uro & Surgery following, s/p I & D on 10/13/24 ---> likely contributory to elevated WBC and suppressed Hgb  - Can repeat serum Flow Cyto but wait since results can be skewed in setting of acute infectious / inflammatory process  - Anemia / hemolysis- unremarkable, except low iron, but Ferritin high > three times the normal range.  - Patient admitted he would like to follow up with Dr Castaneda again as long as insurance allows  - Continue to trend serial CBCs while admitted  - WBC count should trend down with appropriate infection management / tx  - Continue supportive measures    Adria Dang, RICCO, FNP-C  Hematology/Oncology [facility]  [phone]

## 2024-10-15 NOTE — PHYSICAL THERAPY INITIAL EVALUATION ADULT - GAIT DISTANCE, PT EVAL
8ft x2 at bedside with "seated" rest midway leaning back and balancing on scarum to avoid true sitting due to scrotal discomfort, wants to elevate feet on side rungs of walker during seated rest with legs ABDUCTED

## 2024-10-15 NOTE — PHYSICAL THERAPY INITIAL EVALUATION ADULT - PERTINENT HX OF CURRENT PROBLEM, REHAB EVAL
65 y/o M w/ PMH of COPD, CLL, lyme disease, hypothyoridism, cardiac arrest, p/w scrotal pain. Patient states that five days ago a dog jumped on him and cause a small puncture wound to his scrotum. Initially, it was only a small red leslie, but then it started to progress over time and increased in swelling and pain. Patient denies any fever or chills. Patient also, complains about intermittent abdominal discomfort, but is vague about the symptoms. Debra, nausea, vomiting, chest pain, shortness of breath, cough, runny nose, sore throat. As per ED physician, patient was seen by the surgery PA covering for urology team in the emergency room 65 y/o M w/ PMH of COPD, CLL, lyme disease, hypothyroidism, cardiac arrest, p/w scrotal pain. Patient states that five days ago a dog jumped on him and cause a small puncture wound to his scrotum. Initially, it was only a small red leslie, but then it started to progress over time and increased in swelling and pain. Patient denies any fever or chills. Patient also, complains about intermittent abdominal discomfort, but is vague about the symptoms. Debra, nausea, vomiting, chest pain, shortness of breath, cough, runny nose, sore throat. As per ED physician, patient was seen by the surgery PA covering for urology team in the emergency room. On 10/13 pt underwent I & D of R scrotal abscess in scrotal wall ( Dr Angelo Herring) 63 y/o M w/ PMH of COPD, CLL, lyme disease, hypothyroidism, cardiac arrest, p/w scrotal pain. Patient states that five days ago a dog jumped on him and cause a small puncture wound to his scrotum. Initially, it was only a small red leslie, but then it started to progress over time and increased in swelling and pain. Patient denies any fever or chills. Patient also, complains about intermittent abdominal discomfort, but is vague about the symptoms. Debra, nausea, vomiting, chest pain, shortness of breath, cough, runny nose, sore throat. As per ED physician, patient was seen by the surgery PA covering for urology team in the emergency room. On 10/13 pt underwent I & D of R scrotal abscess in scrotal wall ( Dr Angelo Herring),per MD abscess tracking up into R inguinal canal

## 2024-10-15 NOTE — PHYSICAL THERAPY INITIAL EVALUATION ADULT - LIVES WITH, PROFILE
resides in [place] with brother Gume , has 3 [address] home & full flight stairs to 2nd floor bedroom/other relative

## 2024-10-15 NOTE — PROGRESS NOTE ADULT - SUBJECTIVE AND OBJECTIVE BOX
HPI:    63 y/o M with PMHx of COPD, CLL, prior cardiac arrest presented to the ED with ongoing / worsening R scrotal pain over the past 6-7 days. He stated that a dog had jumped on him and caused a small puncture wound which initially was only a small red leslie. He admitted that over the next few days he developed worsening pain with swelling and redness / warmth.     10/13/24: Seen at bedside, no acute distress, has not follow up with Heme Onc in the past 2-3 years because of 'insurance issues', would like to return to see Dr Castaneda if possible    10/15/24: Seen at bedside in no acute distress.     PAST MEDICAL & SURGICAL HISTORY:    CLL (chronic lymphocytic leukemia)    Chronic obstructive pulmonary disease (COPD)    Hypothyroidism    H/o Lyme disease    Hiatal hernia    History of herniated intervertebral disc    H/O sinus surgery    H/O shoulder surgery        MEDICATIONS  (STANDING):    DULoxetine 60 milliGRAM(s) Oral daily  levothyroxine 75 MICROGram(s) Oral daily  piperacillin/tazobactam IVPB.. 3.375 Gram(s) IV Intermittent every 8 hours  predniSONE   Tablet 10 milliGRAM(s) Oral daily  sodium chloride 0.9%. 750 milliLiter(s) (75 mL/Hr) IV Continuous <Continuous>  sodium chloride 0.9%. 1000 milliLiter(s) (125 mL/Hr) IV Continuous <Continuous>  tiotropium 2.5 MICROgram(s)/olodaterol 2.5 MICROgram(s) (STIOLTO) Inhaler 2 Puff(s) Inhalation daily  traZODone 100 milliGRAM(s) Oral at bedtime  vancomycin  IVPB 500 milliGRAM(s) IV Intermittent every 12 hours    MEDICATIONS  (PRN):    acetaminophen     Tablet .. 650 milliGRAM(s) Oral every 6 hours PRN Temp greater or equal to 38C (100.4F), Mild Pain (1 - 3)  albuterol    90 MICROgram(s) HFA Inhaler 2 Puff(s) Inhalation every 6 hours PRN Bronchospasm  HYDROmorphone  Injectable 1 milliGRAM(s) IV Push every 4 hours PRN Severe Pain (7 - 10)  ondansetron Injectable 4 milliGRAM(s) IV Push every 6 hours PRN Nausea and/or Vomiting  oxyCODONE    IR 5 milliGRAM(s) Oral every 4 hours PRN Moderate Pain (4 - 6)        ALLERGIES:    No Known Allergies        FAMILY HISTORY:    lung cancer (Mother)  brain cancer (Mother)  Alzheimer's disease (Father)      REVIEW OF SYSTEMS:    Constitutional, Eyes, ENT, Cardiovascular, Respiratory, Gastrointestinal, Genitourinary, Musculoskeletal, Integumentary, Neurological, Psychiatric, Endocrine, Heme/Lymph and Allergic/Immunologic review of systems are otherwise negative except as noted in HPI.       VITALS:    Vital Signs Last 24 Hrs  T(C): 36.6 (15 Oct 2024 09:36), Max: 36.8 (14 Oct 2024 17:05)  T(F): 97.8 (15 Oct 2024 09:36), Max: 98.2 (14 Oct 2024 17:05)  HR: 61 (15 Oct 2024 09:36) (59 - 67)  BP: 94/68 (15 Oct 2024 09:36) (86/46 - 100/62)  BP(mean): --  RR: 17 (15 Oct 2024 09:36) (17 - 18)  SpO2: 94% (15 Oct 2024 09:36) (94% - 98%)    Parameters below as of 15 Oct 2024 09:36  Patient On (Oxygen Delivery Method): room air            PHYSICAL:    Constitutional: no acute distress, overall disheveled   Eyes: no conjunctival infection, anicteric.   ENT: pharynx is unremarkable  Neck: supple without JVD  Pulmonary: clear to auscultation bilaterally  Cardiac: RRR  Vascular: no calf tenderness, venous stasis changes  Abdomen: normoactive bowel sounds, soft and nontender, no hepatosplenomegaly or masses appreciated.   Lymphatic: no peripheral adenopathy appreciated.   Musculoskeletal: full range of motion and no deformities appreciated.   Skin: scrotum with erythema and swelling R > L side    Neurology: awake, alert, oriented; no obvious focal deficits        LABS:    CBC Full  -  ( 15 Oct 2024 06:30 )  WBC Count : 24.32 K/uL  RBC Count : 2.65 M/uL  Hemoglobin : 9.8 g/dL  Hematocrit : 29.4 %  Platelet Count - Automated : 237 K/uL  Mean Cell Volume : 110.9 fl  Mean Cell Hemoglobin : 37.0 pg  Mean Cell Hemoglobin Concentration : 33.3 gm/dL  Auto Neutrophil # : x  Auto Lymphocyte # : x  Auto Monocyte # : x  Auto Eosinophil # : x  Auto Basophil # : x  Auto Neutrophil % : x  Auto Lymphocyte % : x  Auto Monocyte % : x  Auto Eosinophil % : x  Auto Basophil % : x                            9.8    24.32 )-----------( 237      ( 15 Oct 2024 06:30 )             29.4       PT/INR - ( 13 Oct 2024 07:54 )   PT: 11.6 sec;   INR: 0.98 ratio         PTT - ( 13 Oct 2024 07:54 )  PTT:34.3 sec  Urinalysis Basic - ( 13 Oct 2024 07:54 )    Color: x / Appearance: x / SG: x / pH: x  Gluc: 105 mg/dL / Ketone: x  / Bili: x / Urobili: x   Blood: x / Protein: x / Nitrite: x   Leuk Esterase: x / RBC: x / WBC x   Sq Epi: x / Non Sq Epi: x / Bacteria: x        RADIOLOGY & ADDITIONAL STUDIES:      CT Pelvis w/ IV Cont (10.13.24 @ 02:18)    FINDINGS:  BLADDER: Distended.  REPRODUCTIVE ORGANS: Prostate gland is not enlarged.  LYMPH NODES: Asymmetric enlarged right inguinal lymph nodes;   representative lymph node measuring 1.8 x 1.0 cm.    VISUALIZED PORTIONS:  ABDOMINAL ORGANS: Within normal limits.  BOWEL: Several dilated fluid-filled small bowel loops in the right lower   abdomen.  PERITONEUM/RETROPERITONEUM: Within normal limits.  VESSELS: Mild atherosclerotic calcifications.  ABDOMINAL WALL: Large multilocular tubular rim-enhancing fluid collection   in the right hemiscrotum measuring up to 3 cm in diameter with tail like   extension extending along the right spermatic cord. Collection extends to   the skin surface at the level of the base of the penis. Scrotal wall   edema.  BONES: Degenerative changes of the spine.    IMPRESSION:    Large multilocular tubular rim-enhancing fluid collection in the right   hemiscrotum measuring up to 3 cm in diameter with pigtail-like extension   extending along the right spermatic cord. Collection extends to the skin   surface at the level of the base of the penis. Scrotal wall edema.   Constellation of findings and clinical history are most compatible with   an abscess.    Nonspecific mildly dilated fluid-filled small bowel loops in the right   lower abdomen.          US Testicles (10.13.24 @ 04:41)    FINDINGS:    RIGHT:  Right testis: 3.8 cm x 3.4 cm x 2.6 cm. Normal echogenicity and  echotexture with no masses or areas of architectural distortion. Normal   arterial and venous blood flow pattern.  Right epididymis: Within normal limits.  Right hydrocele: Moderate and complex.  Right varicocele: None.  Right scrotal wall: Thickened to 1.2 cm. Partially visualized complex   collection in the right scrotal wall measuring up to 6.5 cm x 2.5 cm x   4.2 cm with adjacent hyperemia, better depicted on the CT of the pelvis   from earlier the same day, compatible with an abscess.    LEFT:  Left testis: 4.5 cm x 2.8 cm x 2.8 cm. Normal echogenicity and   echotexture with no masses or areas of architectural distortion. Normal   arterial and venous blood flow pattern.  Left epididymis: 0.2 cm x 0.2 cm epididymal head cyst.  Left hydrocele: Small.  Left varicocele: None.  Left scrotal wall: Thickened to 0.6 cm.    IMPRESSION:    Partially visualized moderate sized complex collection in the right   scrotal wall, better depicted on the CT of the pelvis from earlier the   same day, compatible with an abscess.

## 2024-10-15 NOTE — PHYSICAL THERAPY INITIAL EVALUATION ADULT - SKIN INTEGRITY
sutured surgical incision anterior scrotum that has been left open at mid -section to allow drainage of abscess after I&D , reportedly wound was packed ( not visualized) and packing to be removed Thursday 10/17/surgical incision

## 2024-10-15 NOTE — PHYSICAL THERAPY INITIAL EVALUATION ADULT - DIAGNOSIS, PT EVAL
scrotal abscess s/p I&D with sutures; +CLL, h/o cardiac arrest R scrotal abscess s/p I&D with sutures 10/13/24 ; +CLL, h/o cardiac arrest, h/o Lyme disease

## 2024-10-15 NOTE — PROGRESS NOTE ADULT - SUBJECTIVE AND OBJECTIVE BOX
Patient is a 64y old  Male who presents with a chief complaint of scrotal pain (15 Oct 2024 13:11)      INTERVAL HPI/OVERNIGHT EVENTS: Pt seen and examined at bedside. Pt is POD#2 s/p scrotal abscess I&D. Admits to discomfort due to packing in scrotum.     MEDICATIONS  (STANDING):  DULoxetine 60 milliGRAM(s) Oral daily  levothyroxine 75 MICROGram(s) Oral daily  piperacillin/tazobactam IVPB.. 3.375 Gram(s) IV Intermittent every 8 hours  predniSONE   Tablet 10 milliGRAM(s) Oral daily  sodium chloride 0.9%. 750 milliLiter(s) (75 mL/Hr) IV Continuous <Continuous>  sodium chloride 0.9%. 1000 milliLiter(s) (125 mL/Hr) IV Continuous <Continuous>  tiotropium 2.5 MICROgram(s)/olodaterol 2.5 MICROgram(s) (STIOLTO) Inhaler 2 Puff(s) Inhalation daily  traZODone 100 milliGRAM(s) Oral at bedtime  vancomycin  IVPB 500 milliGRAM(s) IV Intermittent every 12 hours    MEDICATIONS  (PRN):  acetaminophen     Tablet .. 650 milliGRAM(s) Oral every 6 hours PRN Temp greater or equal to 38C (100.4F), Mild Pain (1 - 3)  albuterol    90 MICROgram(s) HFA Inhaler 2 Puff(s) Inhalation every 6 hours PRN Bronchospasm  HYDROmorphone  Injectable 1 milliGRAM(s) IV Push every 4 hours PRN Severe Pain (7 - 10)  ondansetron Injectable 4 milliGRAM(s) IV Push every 6 hours PRN Nausea and/or Vomiting  oxyCODONE    IR 5 milliGRAM(s) Oral every 4 hours PRN Moderate Pain (4 - 6)      Allergies    No Known Allergies    Intolerances        REVIEW OF SYSTEMS:  CONSTITUTIONAL: No fever or chills  HEENT:  No headache, no sore throat  RESPIRATORY: No cough, wheezing, or shortness of breath  CARDIOVASCULAR: No chest pain, palpitations  GASTROINTESTINAL: No abd pain, nausea, vomiting, or diarrhea  GENITOURINARY: +discomfort due to packing in scrotum  NEUROLOGICAL: no focal weakness or dizziness  MUSCULOSKELETAL: no myalgias     Vital Signs Last 24 Hrs  T(C): 36.7 (15 Oct 2024 16:32), Max: 36.7 (15 Oct 2024 16:32)  T(F): 98.1 (15 Oct 2024 16:32), Max: 98.1 (15 Oct 2024 16:32)  HR: 64 (15 Oct 2024 16:32) (59 - 67)  BP: 104/74 (15 Oct 2024 16:32) (94/68 - 104/74)  BP(mean): --  RR: 16 (15 Oct 2024 16:32) (16 - 18)  SpO2: 92% (15 Oct 2024 16:14) (92% - 98%)    Parameters below as of 15 Oct 2024 16:14  Patient On (Oxygen Delivery Method): room air        PHYSICAL EXAM:  GENERAL: NAD  HEENT: anicteric, moist mucous membranes  CHEST/LUNG:  CTA b/l, no rales, wheezes, or rhonchi  HEART:  RRR, S1, S2  ABDOMEN:  soft, nontender, nondistended  : scrotal abscess s/p I&D, dressings in place  EXTREMITIES: no edema, cyanosis, or calf tenderness  NERVOUS SYSTEM: answers questions and follows commands appropriately    LABS:                        9.8    24.32 )-----------( 237      ( 15 Oct 2024 06:30 )             29.4     CBC Full  -  ( 15 Oct 2024 06:30 )  WBC Count : 24.32 K/uL  Hemoglobin : 9.8 g/dL  Hematocrit : 29.4 %  Platelet Count - Automated : 237 K/uL  Mean Cell Volume : 110.9 fl  Mean Cell Hemoglobin : 37.0 pg  Mean Cell Hemoglobin Concentration : 33.3 gm/dL  Auto Neutrophil # : x  Auto Lymphocyte # : x  Auto Monocyte # : x  Auto Eosinophil # : x  Auto Basophil # : x  Auto Neutrophil % : x  Auto Lymphocyte % : x  Auto Monocyte % : x  Auto Eosinophil % : x  Auto Basophil % : x    15 Oct 2024 06:30    141    |  109    |  9      ----------------------------<  97     3.8     |  26     |  0.97     Ca    9.1        15 Oct 2024 06:30        Urinalysis Basic - ( 15 Oct 2024 06:30 )    Color: x / Appearance: x / SG: x / pH: x  Gluc: 97 mg/dL / Ketone: x  / Bili: x / Urobili: x   Blood: x / Protein: x / Nitrite: x   Leuk Esterase: x / RBC: x / WBC x   Sq Epi: x / Non Sq Epi: x / Bacteria: x      CAPILLARY BLOOD GLUCOSE            Culture - Abscess with Gram Stain (collected 10-13-24 @ 14:35)  Source: .Abscess  Gram Stain (10-14-24 @ 00:09):    No polymorphonuclear cells seen per low power field    Few Gram Positive Cocci in Clusters seen per oil power field  Preliminary Report (10-15-24 @ 17:05):    Numerous Methicillin Resistant Staphylococcus aureus  Organism: Methicillin resistant Staphylococcus aureus (10-15-24 @ 17:03)  Organism: Methicillin resistant Staphylococcus aureus (10-15-24 @ 17:03)      Method Type: HARVINDER      -  Clindamycin: S <=0.25      -  Daptomycin: S 0.5      -  Erythromycin: R >4      -  Gentamicin: S <=1 Should not be used as monotherapy      -  Linezolid: S 2      -  Oxacillin: R >2      -  Penicillin: R >8      -  Rifampin: S <=1 Should not be used as monotherapy      -  Tetracycline: S <=1      -  Trimethoprim/Sulfamethoxazole: S <=0.5/9.5      -  Vancomycin: S 2    Urinalysis with Rflx Culture (collected 10-13-24 @ 05:05)    Culture - Blood (collected 10-13-24 @ 01:49)  Source: .Blood BLOOD  Preliminary Report (10-15-24 @ 11:01):    No growth at 48 Hours        RADIOLOGY & ADDITIONAL TESTS:    Personally reviewed.     Consultant(s) Notes Reviewed:  [x] YES  [ ] NO

## 2024-10-15 NOTE — PHYSICAL THERAPY INITIAL EVALUATION ADULT - PRECAUTIONS/LIMITATIONS, REHAB EVAL
s/p I&D scrotal abscess; h/o cardiac arrest , h/o CLL/cardiac precautions/surgical precautions s/p I&D scrotal abscess; h/o cardiac arrest , h/o CLL/cardiac precautions/fall precautions/surgical precautions

## 2024-10-15 NOTE — PHYSICAL THERAPY INITIAL EVALUATION ADULT - WEIGHT-BEARING RESTRICTIONS: SIT/STAND, REHAB EVAL
pt favoring RLE ,stands/amb on ball of foot only c/o increased scrotal pain with FWB/foot flat/weight-bearing as tolerated

## 2024-10-15 NOTE — PHYSICAL THERAPY INITIAL EVALUATION ADULT - MODALITIES TREATMENT COMMENTS
pt wearing mesh underwear with ABD pads to surgical site ,but wanted more support, applied a CHUX folded in 1/3s longitudinally with absorbent surface outward as a "hammock" under scrotum and a second pair of mesh underwear over this

## 2024-10-15 NOTE — PROVIDER CONTACT NOTE (CRITICAL VALUE NOTIFICATION) - SITUATION
Abscess culture 10/13, prelim numerous MRSA
Abscess culture collected on 10/13/24 resulted with No polymorphonuclear cells seen per low power field, Few Gram Positive Cocci in Clusters seen per oil power field

## 2024-10-15 NOTE — PHYSICAL THERAPY INITIAL EVALUATION ADULT - GAIT TRAINING, PT EVAL
2 weeks : Independent amb FWB BLEs with least restrictive assistive device as needed ( RW to cane to IND)

## 2024-10-15 NOTE — PHYSICAL THERAPY INITIAL EVALUATION ADULT - ADDITIONAL COMMENTS
pt reports difficulty climbing stairs , he has a home O2 concentrator that he borrowed from a friend but has not used in 3 years and is unsure whether it is in proper working order

## 2024-10-15 NOTE — PHYSICAL THERAPY INITIAL EVALUATION ADULT - NSACTIVITYREC_GEN_A_PT
out of bed to recliner style chair daily ,elevate scrotum with "bridge" ( rolled towel to tolerance/thickness tolerated) amb with RW and 1PA to/from bathroom as needed .Encourage DB/IS to help keep lungs clear ,encourage frequent AROM B feet/ankles while at bed/chair rest

## 2024-10-15 NOTE — PHYSICAL THERAPY INITIAL EVALUATION ADULT - PATIENT/FAMILY/SIGNIFICANT OTHER GOALS STATEMENT, PT EVAL
pt reports he  was medicated for pain few hours ago with some relief , feels it would be easier to move with improved scrotal support like underwear with pouch ( ?suspensory) pt has no underwear here when suggested cotton briefs ; when asked if his brother could bring from home states he does not have any new ones and they have been having trouble "getting by"

## 2024-10-15 NOTE — PROGRESS NOTE ADULT - NS ATTEND AMEND GEN_ALL_CORE FT
Patient contacted regarding recent discharge and COVID-19 risk   Care Transition Nurse/ Ambulatory Care Manager contacted the patient by telephone to perform post discharge assessment. Verified name and  with patient as identifiers. Patient has following risk factors of: heart failure and diabetes. CTN/ACM reviewed discharge instructions, medical action plan and red flags related to discharge diagnosis. Reviewed and educated them on any new and changed medications related to discharge diagnosis. Advised obtaining a 90-day supply of all daily and as-needed medications. Education provided regarding infection prevention, and signs and symptoms of COVID-19 and when to seek medical attention with patient who verbalized understanding. Discussed exposure protocols and quarantine from [address] you at higher risk for severe illness  and given an opportunity for questions and concerns. The patient agrees to contact the COVID-19 hotline [phone] or PCP office for questions related to their healthcare. CTN/ACM provided contact information for future reference. From CDC: Are you at higher risk for severe illness?  Wash your hands often.  Avoid close contact (6 feet, which is about two arm lengths) with people who are sick.  Put distance between yourself and other people if COVID-19 is spreading in your community.  Clean and disinfect frequently touched surfaces.  Avoid all cruise travel and non-essential air travel.  Call your healthcare professional if you have concerns about COVID-19 and your underlying condition or if you are sick. For more information on steps you can take to protect yourself, see CDC's How to [place] for follow-up call in 7-14 days based on severity of symptoms and risk factors. 64 year old male with CLL, COPD, admitted with scrotal abscess s/p drainage  WBC 30 in setting of CLL and infection, mild anemia with macrocytosis - etiology unclear  Anemia work up pending  Needs outpatient follow up with Dr. Castaneda after discharge. 64 year old male with CLL, COPD, admitted with scrotal abscess s/p drainage  WBC 30 in setting of CLL and infection, mild anemia with macrocytosis  Anemia work up   Cont outpatient follow up with Dr. Castaneda after discharge.

## 2024-10-15 NOTE — PROGRESS NOTE ADULT - ASSESSMENT
63 y/o M w/ PMH of COPD, CLL, lyme disease, hypothyoridism, cardiac arrest, p/w scrotal pain. Patient states that five days ago a dog jumped on him and cause a small puncture wound to his scrotum. Initially, it was only a small red leslie, but then it started to progress over time and increased in swelling and pain. Patient denies any fever or chills. Patient also, complains about intermittent abdominal discomfort, but is vague about the symptoms. Debra, nausea, vomiting, chest pain, shortness of breath, cough, runny nose, sore throat. As per ED physician, patient was seen by the surgery pa covering for urology team in the emergency room    #Scrotal abscess  - S/p I&D of scrotal abscess POD #2  - Prelim blood cx negative  - Abscess fluid cx MRSA+   - Continue IV Vanco & Cefepime   - Scrotal elevation  - ID consulted, f/u recs    #Hypotension  - Asymptomatic  - Continue IV NS maintenance @ 125cc/hr    #Leukocytosis  - Hx of CLL, unknown baseline WBC  - Monitor CBC    #Hypothyroid  - Continue Levothyroxine    #Anemia  - Likely anemia of chronic disease  - Hgb low, stable    #COPD  - Continue Spiriva  - Continue Prednisone    #Depression  - Continue Duloxetine    #Insomnia  - Continue Trazodone    #DVT ppx  - SCDs    #Dispo: Pt requesting [facility]   65 y/o M w/ PMH of COPD, CLL, lyme disease, hypothyoridism, cardiac arrest, p/w scrotal pain. Patient states that five days ago a dog jumped on him and cause a small puncture wound to his scrotum. Initially, it was only a small red leslie, but then it started to progress over time and increased in swelling and pain. Patient denies any fever or chills. Patient also, complains about intermittent abdominal discomfort, but is vague about the symptoms. Debra, nausea, vomiting, chest pain, shortness of breath, cough, runny nose, sore throat. As per ED physician, patient was seen by the surgery pa covering for urology team in the emergency room    #Scrotal abscess  - S/p I&D of scrotal abscess POD #2  - Prelim blood cx negative  - Abscess fluid cx MRSA+   - Continue IV Vanco & Zosyn  - Scrotal elevation  - ID consulted, f/u recs    #Hypotension  - Asymptomatic  - Continue IV NS maintenance @ 125cc/hr    #Leukocytosis  - Hx of CLL, unknown baseline WBC  - Monitor CBC    #Hypothyroid  - Continue Levothyroxine    #Anemia  - Likely anemia of chronic disease  - Hgb low, stable    #COPD  - Continue Spiriva  - Continue Prednisone    #Depression  - Continue Duloxetine    #Insomnia  - Continue Trazodone    #DVT ppx  - SCDs    #Dispo: Pt requesting [facility]

## 2024-10-15 NOTE — PHYSICAL THERAPY INITIAL EVALUATION ADULT - LEVEL OF INDEPENDENCE: SIT/SUPINE, REHAB EVAL
c/o increased pain after mobilizing ,requested return to bed ,assumes supine with legs FLEXED/ABDUCTED, wimpering in pain, RN notified/contact guard/minimum assist (75% patients effort)

## 2024-10-15 NOTE — PHYSICAL THERAPY INITIAL EVALUATION ADULT - MANUAL MUSCLE TESTING RESULTS, REHAB EVAL
BUEs in GOOD range ,BLEs >/= 3 to 3+/5 proximally but inhibited /apprehensive of increasing scrotal pain with testing AROM B hips ; distal BLEs =GOOD range

## 2024-10-15 NOTE — PHYSICAL THERAPY INITIAL EVALUATION ADULT - ACTIVE RANGE OF MOTION EXAMINATION, REHAB EVAL
c/o scrotal pain with hip FLEX to >/=80 degrees B, unable to tolerate hip ADD at this time due to scrotal sensitivity/discomfort/bilateral upper extremity Active ROM was WFL (within functional limits)/bilateral  lower extremity Active ROM was WFL (within functional limits)

## 2024-10-15 NOTE — PHYSICAL THERAPY INITIAL EVALUATION ADULT - LEVEL OF INDEPENDENCE: SUPINE/SIT, REHAB EVAL
pt resisting assistance offered ,prefers to get toward sitting himself/contact guard/stand-by assist

## 2024-10-15 NOTE — PHYSICAL THERAPY INITIAL EVALUATION ADULT - GENERAL OBSERVATIONS, REHAB EVAL
resting supine in bed, wears eyeglasses,voice is somewhat hoarse,(?smoker) awake,alert, Ox3,reluctant to mobilize as he reports his testicles have been "filleted open like a butterfly", wearing mesh disposable underwear with dressing covering anterior aspect scrotum with small amount fresh bloody drainage; pt with open surgical wound  at suture line anterior scrotum to allow drainage s/p I&D of abscess

## 2024-10-15 NOTE — PHYSICAL THERAPY INITIAL EVALUATION ADULT - RANGE OF MOTION EXAMINATION, REHAB EVAL
pt reports ++wear and tear injuries to B shoulders & R hip joints and makes reference to needing joint replacements in future

## 2024-10-15 NOTE — PROVIDER CONTACT NOTE (CRITICAL VALUE NOTIFICATION) - TEST AND RESULT REPORTED:
Abscess cultures- No polymorphonuclear cells seen per low power field, Few Gram Positive Cocci in Clusters seen per oil power field
Abscess culture 10/13, prelim numerous MRSA

## 2024-10-15 NOTE — PHYSICAL THERAPY INITIAL EVALUATION ADULT - LEVEL OF INDEPENDENCE: SCOOT/BRIDGE, REHAB EVAL
pt prefers to perform scooting "crab style" on buttocks toward EOB before attempting/assuming upright sitting due to scrotal discomfort/contact guard/stand-by assist

## 2024-10-16 LAB
ALBUMIN SERPL ELPH-MCNC: 2.3 G/DL — LOW (ref 3.3–5)
ALP SERPL-CCNC: 77 U/L — SIGNIFICANT CHANGE UP (ref 40–120)
ALT FLD-CCNC: 29 U/L — SIGNIFICANT CHANGE UP (ref 12–78)
ANION GAP SERPL CALC-SCNC: 4 MMOL/L — LOW (ref 5–17)
ANISOCYTOSIS BLD QL: SLIGHT — SIGNIFICANT CHANGE UP
AST SERPL-CCNC: 25 U/L — SIGNIFICANT CHANGE UP (ref 15–37)
BASOPHILS # BLD AUTO: 0 K/UL — SIGNIFICANT CHANGE UP (ref 0–0.2)
BASOPHILS NFR BLD AUTO: 0 % — SIGNIFICANT CHANGE UP (ref 0–2)
BILIRUB SERPL-MCNC: 0.1 MG/DL — LOW (ref 0.2–1.2)
BUN SERPL-MCNC: 14 MG/DL — SIGNIFICANT CHANGE UP (ref 7–23)
CALCIUM SERPL-MCNC: 9.2 MG/DL — SIGNIFICANT CHANGE UP (ref 8.5–10.1)
CHLORIDE SERPL-SCNC: 106 MMOL/L — SIGNIFICANT CHANGE UP (ref 96–108)
CO2 SERPL-SCNC: 28 MMOL/L — SIGNIFICANT CHANGE UP (ref 22–31)
CREAT SERPL-MCNC: 0.88 MG/DL — SIGNIFICANT CHANGE UP (ref 0.5–1.3)
EGFR: 97 ML/MIN/1.73M2 — SIGNIFICANT CHANGE UP
EOSINOPHIL # BLD AUTO: 0.25 K/UL — SIGNIFICANT CHANGE UP (ref 0–0.5)
EOSINOPHIL NFR BLD AUTO: 1 % — SIGNIFICANT CHANGE UP (ref 0–6)
GLUCOSE SERPL-MCNC: 93 MG/DL — SIGNIFICANT CHANGE UP (ref 70–99)
HCT VFR BLD CALC: 29.5 % — LOW (ref 39–50)
HGB BLD-MCNC: 9.7 G/DL — LOW (ref 13–17)
LYMPHOCYTES # BLD AUTO: 20.79 K/UL — HIGH (ref 1–3.3)
LYMPHOCYTES # BLD AUTO: 84 % — HIGH (ref 13–44)
MACROCYTES BLD QL: SIGNIFICANT CHANGE UP
MANUAL SMEAR VERIFICATION: SIGNIFICANT CHANGE UP
MCHC RBC-ENTMCNC: 32.9 GM/DL — SIGNIFICANT CHANGE UP (ref 32–36)
MCHC RBC-ENTMCNC: 36.9 PG — HIGH (ref 27–34)
MCV RBC AUTO: 112.2 FL — HIGH (ref 80–100)
MONOCYTES # BLD AUTO: 0 K/UL — SIGNIFICANT CHANGE UP (ref 0–0.9)
MONOCYTES NFR BLD AUTO: 0 % — LOW (ref 2–14)
NEUTROPHILS # BLD AUTO: 3.71 K/UL — SIGNIFICANT CHANGE UP (ref 1.8–7.4)
NEUTROPHILS NFR BLD AUTO: 15 % — LOW (ref 43–77)
NRBC # BLD: 0 /100 WBCS — SIGNIFICANT CHANGE UP (ref 0–0)
NRBC # BLD: SIGNIFICANT CHANGE UP /100 WBCS (ref 0–0)
PLAT MORPH BLD: NORMAL — SIGNIFICANT CHANGE UP
PLATELET # BLD AUTO: 270 K/UL — SIGNIFICANT CHANGE UP (ref 150–400)
POTASSIUM SERPL-MCNC: 4 MMOL/L — SIGNIFICANT CHANGE UP (ref 3.5–5.3)
POTASSIUM SERPL-SCNC: 4 MMOL/L — SIGNIFICANT CHANGE UP (ref 3.5–5.3)
PROT SERPL-MCNC: 6 GM/DL — SIGNIFICANT CHANGE UP (ref 6–8.3)
RBC # BLD: 2.63 M/UL — LOW (ref 4.2–5.8)
RBC # FLD: 13.8 % — SIGNIFICANT CHANGE UP (ref 10.3–14.5)
RBC BLD AUTO: ABNORMAL
SMUDGE CELLS # BLD: PRESENT — SIGNIFICANT CHANGE UP
SODIUM SERPL-SCNC: 138 MMOL/L — SIGNIFICANT CHANGE UP (ref 135–145)
WBC # BLD: 24.75 K/UL — HIGH (ref 3.8–10.5)
WBC # FLD AUTO: 24.75 K/UL — HIGH (ref 3.8–10.5)

## 2024-10-16 PROCEDURE — 99232 SBSQ HOSP IP/OBS MODERATE 35: CPT

## 2024-10-16 RX ORDER — CHLORHEXIDINE GLUCONATE 40 MG/ML
1 SOLUTION TOPICAL
Refills: 0 | Status: DISCONTINUED | OUTPATIENT
Start: 2024-10-16 | End: 2024-10-24

## 2024-10-16 RX ORDER — VANCOMYCIN HYDROCHLORIDE 50 MG/ML
750 KIT ORAL EVERY 12 HOURS
Refills: 0 | Status: COMPLETED | OUTPATIENT
Start: 2024-10-16 | End: 2024-10-23

## 2024-10-16 RX ORDER — INFLUENZ VIR VAC TV P-SURF2003 15MCG/.5ML
0.5 SYRINGE (ML) INTRAMUSCULAR ONCE
Refills: 0 | Status: DISCONTINUED | OUTPATIENT
Start: 2024-10-16 | End: 2024-10-24

## 2024-10-16 RX ORDER — POLYETHYLENE GLYCOL 3350 17 G/17G
17 POWDER, FOR SOLUTION ORAL DAILY
Refills: 0 | Status: DISCONTINUED | OUTPATIENT
Start: 2024-10-16 | End: 2024-10-24

## 2024-10-16 RX ADMIN — Medication 1 MILLIGRAM(S): at 14:32

## 2024-10-16 RX ADMIN — Medication 1 MILLIGRAM(S): at 09:59

## 2024-10-16 RX ADMIN — SODIUM CHLORIDE 125 MILLILITER(S): 9 INJECTION, SOLUTION INTRAMUSCULAR; INTRAVENOUS; SUBCUTANEOUS at 23:23

## 2024-10-16 RX ADMIN — POLYETHYLENE GLYCOL 3350 17 GRAM(S): 17 POWDER, FOR SOLUTION ORAL at 12:44

## 2024-10-16 RX ADMIN — TIOTROPIUM BROMIDE AND OLODATEROL 2 PUFF(S): 3.124; 2.736 SPRAY, METERED RESPIRATORY (INHALATION) at 07:50

## 2024-10-16 RX ADMIN — Medication 1 MILLIGRAM(S): at 21:20

## 2024-10-16 RX ADMIN — CHLORHEXIDINE GLUCONATE 1 APPLICATION(S): 40 SOLUTION TOPICAL at 09:38

## 2024-10-16 RX ADMIN — VANCOMYCIN HYDROCHLORIDE 100 MILLIGRAM(S): KIT at 09:21

## 2024-10-16 RX ADMIN — Medication 10 MILLIGRAM(S): at 09:17

## 2024-10-16 RX ADMIN — Medication 1 MILLIGRAM(S): at 20:57

## 2024-10-16 RX ADMIN — Medication 75 MICROGRAM(S): at 05:37

## 2024-10-16 RX ADMIN — VANCOMYCIN HYDROCHLORIDE 250 MILLIGRAM(S): KIT at 22:07

## 2024-10-16 RX ADMIN — PIPERACILLIN AND TAZOBACTAM 25 GRAM(S): .5; 4 INJECTION, POWDER, LYOPHILIZED, FOR SOLUTION INTRAVENOUS at 16:41

## 2024-10-16 RX ADMIN — SODIUM CHLORIDE 125 MILLILITER(S): 9 INJECTION, SOLUTION INTRAMUSCULAR; INTRAVENOUS; SUBCUTANEOUS at 14:43

## 2024-10-16 RX ADMIN — Medication 1 MILLIGRAM(S): at 10:30

## 2024-10-16 RX ADMIN — SODIUM CHLORIDE 125 MILLILITER(S): 9 INJECTION, SOLUTION INTRAMUSCULAR; INTRAVENOUS; SUBCUTANEOUS at 05:44

## 2024-10-16 RX ADMIN — Medication 1 MILLIGRAM(S): at 15:02

## 2024-10-16 RX ADMIN — DULOXETINE HYDROCHLORIDE 60 MILLIGRAM(S): 30 CAPSULE, DELAYED RELEASE ORAL at 09:17

## 2024-10-16 RX ADMIN — TRAZODONE HYDROCHLORIDE 100 MILLIGRAM(S): 100 TABLET ORAL at 22:07

## 2024-10-16 RX ADMIN — PIPERACILLIN AND TAZOBACTAM 25 GRAM(S): .5; 4 INJECTION, POWDER, LYOPHILIZED, FOR SOLUTION INTRAVENOUS at 09:15

## 2024-10-16 NOTE — PATIENT PROFILE ADULT - NSPROSPHOSPCHAPLAINYN_GEN_A_NUR
Impression: Nonexudative age-related macular degeneration, bilateral, early dry stage: H35.3131. Plan: Macular degeneration, dry type with stable vision. Will continue to monitor vision and the patient has been instructed to call with any vision changes. HOLA and AREDS2 discussed with patient.  

rtc 1 year DE OCT/MAC no

## 2024-10-16 NOTE — PATIENT PROFILE ADULT - FALL HARM RISK - RISK INTERVENTIONS

## 2024-10-16 NOTE — PROGRESS NOTE ADULT - ASSESSMENT
65 y/o M w/ PMH of COPD, CLL, lyme disease, hypothyoridism, cardiac arrest, p/w scrotal pain. Patient states that five days ago a dog jumped on him and cause a small puncture wound to his scrotum. Initially, it was only a small red leslie, but then it started to progress over time and increased in swelling and pain. Patient denies any fever or chills. Patient also, complains about intermittent abdominal discomfort, but is vague about the symptoms. Debra, nausea, vomiting, chest pain, shortness of breath, cough, runny nose, sore throat. As per ED physician, patient was seen by the surgery pa covering for urology team in the emergency room    #Scrotal abscess  - S/p I&D of scrotal abscess POD #3  - Prelim blood cx negative  - Abscess fluid cx MRSA+   - Continue IV Vanco & Zosyn  - Scrotal elevation  - ID consulted, f/u recs    #Hypotension  - Asymptomatic  - Continue IV NS maintenance @ 125cc/hr    #Leukocytosis  - Hx of CLL, unknown baseline WBC  - Monitor CBC    #Hypothyroid  - Continue Levothyroxine    #Anemia  - Likely anemia of chronic disease  - Hgb low, stable    #COPD  - Continue Spiriva  - Continue Prednisone    #Depression  - Continue Duloxetine    #Insomnia  - Continue Trazodone    #DVT ppx  - SCDs    #Dispo: Pt requesting [facility]OWMAN following.

## 2024-10-16 NOTE — PROGRESS NOTE ADULT - SUBJECTIVE AND OBJECTIVE BOX
Date of service: 10-16-24 @ 10:24    pt seen and examined  s/p I & D of scrotal abscess 10/13  packing in place  has discomfort  cx growing MRSA   afebrile    ROS: no fever or chills; denies dizziness, no HA, no SOB or cough, no abdominal pain, no diarrhea or constipation; no legs pain, no rashes      MEDICATIONS  (STANDING):  chlorhexidine 4% Liquid 1 Application(s) Topical <User Schedule>  DULoxetine 60 milliGRAM(s) Oral daily  levothyroxine 75 MICROGram(s) Oral daily  piperacillin/tazobactam IVPB.. 3.375 Gram(s) IV Intermittent every 8 hours  predniSONE   Tablet 10 milliGRAM(s) Oral daily  sodium chloride 0.9%. 750 milliLiter(s) (75 mL/Hr) IV Continuous <Continuous>  sodium chloride 0.9%. 1000 milliLiter(s) (125 mL/Hr) IV Continuous <Continuous>  tiotropium 2.5 MICROgram(s)/olodaterol 2.5 MICROgram(s) (STIOLTO) Inhaler 2 Puff(s) Inhalation daily  traZODone 100 milliGRAM(s) Oral at bedtime  vancomycin  IVPB 750 milliGRAM(s) IV Intermittent every 12 hours    Vital Signs Last 24 Hrs  T(C): 36.6 (16 Oct 2024 08:58), Max: 36.7 (15 Oct 2024 16:32)  T(F): 97.8 (16 Oct 2024 08:58), Max: 98.1 (15 Oct 2024 16:32)  HR: 58 (16 Oct 2024 08:58) (58 - 64)  BP: 97/70 (16 Oct 2024 08:58) (97/70 - 111/78)  BP(mean): --  RR: 16 (16 Oct 2024 08:58) (16 - 18)  SpO2: 96% (16 Oct 2024 08:58) (92% - 97%)    Parameters below as of 16 Oct 2024 08:58  Patient On (Oxygen Delivery Method): room air      PE:  Constitutional: NAD  HEENT: NC/AT, EOMI, PERRLA, conjunctivae clear; ears and nose atraumatic; pharynx benign  Neck: supple; thyroid not palpable  Back: no tenderness  Respiratory: respiratory effort normal; clear to auscultation  Cardiovascular: S1S2 regular, no murmurs  Abdomen: soft, not tender, not distended, positive BS; liver and spleen WNL  Genitourinary: no suprapubic tenderness; scrotal edema tenderness warmth erythema   Lymphatic: no LN palpable  Musculoskeletal: no muscle tenderness, no joint swelling or tenderness  Extremities: no pedal edema  Neurological/ Psychiatric: AxOx3, Judgement and insight normal;  moving all extremities  Skin: no rashes; no palpable lesions    Labs: all available labs reviewed                                              9.7    24.75 )-----------( 270      ( 16 Oct 2024 06:35 )             29.5     10-16    138  |  106  |  14  ----------------------------<  93  4.0   |  28  |  0.88    Ca    9.2      16 Oct 2024 06:35    TPro  6.0  /  Alb  2.3[L]  /  TBili  0.1[L]  /  DBili  x   /  AST  25  /  ALT  29  /  AlkPhos  77  10-16       Vancomycin Level, Trough: 6.0 ug/mL (10-15 @ 21:54)  Vancomycin Level, Trough: 5.4 ug/mL (10-15 @ 11:07)         Urinalysis Basic - ( 13 Oct 2024 07:54 )    Color: x / Appearance: x / SG: x / pH: x  Gluc: 105 mg/dL / Ketone: x  / Bili: x / Urobili: x   Blood: x / Protein: x / Nitrite: x   Leuk Esterase: x / RBC: x / WBC x   Sq Epi: x / Non Sq Epi: x / Bacteria: x    Culture - Abscess with Gram Stain (10.13.24 @ 14:35)   Gram Stain:   No polymorphonuclear cells seen per low power field   MRSA  Urinalysis with Rflx Culture (10.13.24 @ 05:05)   Urine Appearance: Clear  Color: Yellow  Specific Gravity: >1.030  pH Urine: 6.5  Protein, Urine: Negative mg/dL  Glucose Qualitative, Urine: Negative mg/dL  Ketone - Urine: Negative mg/dL  Blood, Urine: Negative  Bilirubin: Negative  Urobilinogen: 0.2 mg/dL  Leukocyte Esterase Concentration: Negative  Nitrite: Negative    Radiology: all available radiological tests reviewed      IMPRESSION:  Partially visualized moderate sized complex collection in the right   scrotal wall, better depicted on the CT of the pelvis from earlier the   same day, compatible with an abscess.        IMPRESSION:  Large multilocular tubular rim-enhancing fluid collection in the right   hemiscrotum measuring up to 3 cm in diameter with pigtail-like extension   extending along the right spermatic cord. Collection extends to the skin   surface at the level of the base of the penis. Scrotal wall edema.   Constellation of findings and clinical history are most compatible with   an abscess.    Nonspecific mildly dilated fluid-filled small bowel loops in the right   lower abdomen.    Advanced directives addressed: full resuscitation

## 2024-10-16 NOTE — PROGRESS NOTE ADULT - SUBJECTIVE AND OBJECTIVE BOX
Patient is a 64y old  Male who presents with a chief complaint of scrotal pain (16 Oct 2024 10:19)      INTERVAL HPI/OVERNIGHT EVENTS: Pt seen and examined at bedside. Denies any acute complaints. Pt is POD#3 s/p scrotal abscess I&D.    MEDICATIONS  (STANDING):  chlorhexidine 4% Liquid 1 Application(s) Topical <User Schedule>  DULoxetine 60 milliGRAM(s) Oral daily  influenza   Vaccine 0.5 milliLiter(s) IntraMuscular once  levothyroxine 75 MICROGram(s) Oral daily  piperacillin/tazobactam IVPB.. 3.375 Gram(s) IV Intermittent every 8 hours  polyethylene glycol 3350 17 Gram(s) Oral daily  predniSONE   Tablet 10 milliGRAM(s) Oral daily  sodium chloride 0.9%. 1000 milliLiter(s) (125 mL/Hr) IV Continuous <Continuous>  sodium chloride 0.9%. 750 milliLiter(s) (75 mL/Hr) IV Continuous <Continuous>  tiotropium 2.5 MICROgram(s)/olodaterol 2.5 MICROgram(s) (STIOLTO) Inhaler 2 Puff(s) Inhalation daily  traZODone 100 milliGRAM(s) Oral at bedtime  vancomycin  IVPB 750 milliGRAM(s) IV Intermittent every 12 hours    MEDICATIONS  (PRN):  acetaminophen     Tablet .. 650 milliGRAM(s) Oral every 6 hours PRN Temp greater or equal to 38C (100.4F), Mild Pain (1 - 3)  albuterol    90 MICROgram(s) HFA Inhaler 2 Puff(s) Inhalation every 6 hours PRN Bronchospasm  HYDROmorphone  Injectable 1 milliGRAM(s) IV Push every 4 hours PRN Severe Pain (7 - 10)  ondansetron Injectable 4 milliGRAM(s) IV Push every 6 hours PRN Nausea and/or Vomiting  oxyCODONE    IR 5 milliGRAM(s) Oral every 4 hours PRN Moderate Pain (4 - 6)      Allergies    No Known Allergies    Intolerances        REVIEW OF SYSTEMS:  CONSTITUTIONAL: No fever or chills  HEENT:  No headache, no sore throat  RESPIRATORY: No cough, wheezing, or shortness of breath  CARDIOVASCULAR: No chest pain, palpitations  GASTROINTESTINAL: No abd pain, nausea, vomiting, or diarrhea  GENITOURINARY: +discomfort due to packing in scrotum  NEUROLOGICAL: no focal weakness or dizziness  MUSCULOSKELETAL: no myalgias     Vital Signs Last 24 Hrs  T(C): 36.2 (16 Oct 2024 15:10), Max: 36.6 (16 Oct 2024 08:58)  T(F): 97.2 (16 Oct 2024 15:10), Max: 97.8 (16 Oct 2024 08:58)  HR: 61 (16 Oct 2024 15:10) (58 - 61)  BP: 111/75 (16 Oct 2024 15:10) (97/70 - 111/78)  BP(mean): --  RR: 17 (16 Oct 2024 15:10) (16 - 17)  SpO2: 96% (16 Oct 2024 15:10) (96% - 97%)    Parameters below as of 16 Oct 2024 15:10  Patient On (Oxygen Delivery Method): nasal cannula  O2 Flow (L/min): 2      PHYSICAL EXAM:  GENERAL: NAD  HEENT: anicteric, moist mucous membranes  CHEST/LUNG:  CTA b/l, no rales, wheezes, or rhonchi  HEART:  RRR, S1, S2  ABDOMEN:  soft, nontender, nondistended  : scrotal abscess s/p I&D, dressings in place  EXTREMITIES: no edema, cyanosis, or calf tenderness  NERVOUS SYSTEM: answers questions and follows commands appropriately      LABS:                        9.7    24.75 )-----------( 270      ( 16 Oct 2024 06:35 )             29.5     CBC Full  -  ( 16 Oct 2024 06:35 )  WBC Count : 24.75 K/uL  Hemoglobin : 9.7 g/dL  Hematocrit : 29.5 %  Platelet Count - Automated : 270 K/uL  Mean Cell Volume : 112.2 fl  Mean Cell Hemoglobin : 36.9 pg  Mean Cell Hemoglobin Concentration : 32.9 gm/dL  Auto Neutrophil # : 3.71 K/uL  Auto Lymphocyte # : 20.79 K/uL  Auto Monocyte # : 0.00 K/uL  Auto Eosinophil # : 0.25 K/uL  Auto Basophil # : 0.00 K/uL  Auto Neutrophil % : 15.0 %  Auto Lymphocyte % : 84.0 %  Auto Monocyte % : 0.0 %  Auto Eosinophil % : 1.0 %  Auto Basophil % : 0.0 %    16 Oct 2024 06:35    138    |  106    |  14     ----------------------------<  93     4.0     |  28     |  0.88     Ca    9.2        16 Oct 2024 06:35    TPro  6.0    /  Alb  2.3    /  TBili  0.1    /  DBili  x      /  AST  25     /  ALT  29     /  AlkPhos  77     16 Oct 2024 06:35      Urinalysis Basic - ( 16 Oct 2024 06:35 )    Color: x / Appearance: x / SG: x / pH: x  Gluc: 93 mg/dL / Ketone: x  / Bili: x / Urobili: x   Blood: x / Protein: x / Nitrite: x   Leuk Esterase: x / RBC: x / WBC x   Sq Epi: x / Non Sq Epi: x / Bacteria: x      CAPILLARY BLOOD GLUCOSE            Culture - Abscess with Gram Stain (collected 10-13-24 @ 14:35)  Source: .Abscess  Gram Stain (10-14-24 @ 00:09):    No polymorphonuclear cells seen per low power field    Few Gram Positive Cocci in Clusters seen per oil power field  Preliminary Report (10-15-24 @ 17:05):    Numerous Methicillin Resistant Staphylococcus aureus  Organism: Methicillin resistant Staphylococcus aureus (10-15-24 @ 17:03)  Organism: Methicillin resistant Staphylococcus aureus (10-15-24 @ 17:03)      -  Clindamycin: S <=0.25      -  Oxacillin: R >2      -  Gentamicin: S <=1 Should not be used as monotherapy      -  Daptomycin: S 0.5      -  Linezolid: S 2      -  Vancomycin: S 2      -  Tetracycline: S <=1      Method Type: HARVINDER      -  Penicillin: R >8      -  Rifampin: S <=1 Should not be used as monotherapy      -  Erythromycin: R >4      -  Trimethoprim/Sulfamethoxazole: S <=0.5/9.5    Urinalysis with Rflx Culture (collected 10-13-24 @ 05:05)    Culture - Blood (collected 10-13-24 @ 01:49)  Source: .Blood BLOOD  Preliminary Report (10-16-24 @ 11:00):    No growth at 72 Hours        RADIOLOGY & ADDITIONAL TESTS:    Personally reviewed.     Consultant(s) Notes Reviewed:  [x] YES  [ ] NO

## 2024-10-16 NOTE — PROGRESS NOTE ADULT - ASSESSMENT
63 y/o M w/ PMH of COPD, CLL, lyme disease, hypothyoridism, cardiac arrest, p/w scrotal pain. Patient states that five days ago a dog jumped on him and cause a small puncture wound to his scrotum. Initially, it was only a small red leslie, but then it started to progress over time and increased in swelling and pain.  Patient also, complains about intermittent abdominal discomfort, but is vague about the symptoms. Denies, nausea, vomiting, chest pain, shortness of breath, cough, runny nose, sore throat. As per ED physician, patient was seen by the surgery p a covering for urology team in the emergency room. Wbc ct 29 UA positive, imaging shows Large multilocular tubular rim-enhancing fluid collection in the right hemiscrotum measuring up to 3 cm in diameter with pigtail-like extension extending along the right spermatic cord. Collection extends to the skin surface at the level of the base of the penis. Scrotal wall edema. Started on IV abx, urology consulted.     1. Severe scrotal abscess/cellulitis. Pyuria. Complicated UTI. CLL. Leukocytosis. s/p Dog bite  - imaging reviewed   - dc vancomycin/cefepime  - on IV zosyn [id number]  #4   - on vancomycin #3 trough low increase dose to [id number]  - continue with antibiotic coverage   - f/u urine cx blood cx no growth, abscess cx growing MRSA  - urology eval appreciated s/p I & D    - tolerating abx well so far; no side effects noted  - reason for abx use and side effects reviewed with patient  - supportive care  - fu cbc    Clinical team may change from intravenous to oral antibiotics when the following criteria are met:   1. Patient is clinically improving/stable       a)	Improved signs and symptoms of infection from initial presentation       b)	Afebrile for 24 hours       c)	Leukocytosis trending towards normal range   2. Patient is tolerating oral intake   3. Initial/repeat blood cultures are negative     when above met change to po bactrim 1 DS BID x 14-21 day course

## 2024-10-17 ENCOUNTER — TRANSCRIPTION ENCOUNTER (OUTPATIENT)
Age: 64
End: 2024-10-17

## 2024-10-17 LAB
ALBUMIN SERPL ELPH-MCNC: 2.4 G/DL — LOW (ref 3.3–5)
ALP SERPL-CCNC: 68 U/L — SIGNIFICANT CHANGE UP (ref 40–120)
ALT FLD-CCNC: 28 U/L — SIGNIFICANT CHANGE UP (ref 12–78)
ANION GAP SERPL CALC-SCNC: 4 MMOL/L — LOW (ref 5–17)
AST SERPL-CCNC: 20 U/L — SIGNIFICANT CHANGE UP (ref 15–37)
BASOPHILS # BLD AUTO: 0 K/UL — SIGNIFICANT CHANGE UP (ref 0–0.2)
BASOPHILS NFR BLD AUTO: 0 % — SIGNIFICANT CHANGE UP (ref 0–2)
BILIRUB SERPL-MCNC: 0.2 MG/DL — SIGNIFICANT CHANGE UP (ref 0.2–1.2)
BUN SERPL-MCNC: 14 MG/DL — SIGNIFICANT CHANGE UP (ref 7–23)
CALCIUM SERPL-MCNC: 8.9 MG/DL — SIGNIFICANT CHANGE UP (ref 8.5–10.1)
CHLORIDE SERPL-SCNC: 104 MMOL/L — SIGNIFICANT CHANGE UP (ref 96–108)
CO2 SERPL-SCNC: 30 MMOL/L — SIGNIFICANT CHANGE UP (ref 22–31)
CREAT SERPL-MCNC: 0.9 MG/DL — SIGNIFICANT CHANGE UP (ref 0.5–1.3)
EGFR: 96 ML/MIN/1.73M2 — SIGNIFICANT CHANGE UP
EOSINOPHIL # BLD AUTO: 0.47 K/UL — SIGNIFICANT CHANGE UP (ref 0–0.5)
EOSINOPHIL NFR BLD AUTO: 2 % — SIGNIFICANT CHANGE UP (ref 0–6)
GLUCOSE SERPL-MCNC: 87 MG/DL — SIGNIFICANT CHANGE UP (ref 70–99)
HCT VFR BLD CALC: 27.2 % — LOW (ref 39–50)
HGB BLD-MCNC: 9 G/DL — LOW (ref 13–17)
LYMPHOCYTES # BLD AUTO: 19.64 K/UL — HIGH (ref 1–3.3)
LYMPHOCYTES # BLD AUTO: 84 % — HIGH (ref 13–44)
MACROCYTES BLD QL: SIGNIFICANT CHANGE UP
MANUAL SMEAR VERIFICATION: SIGNIFICANT CHANGE UP
MCHC RBC-ENTMCNC: 33.1 GM/DL — SIGNIFICANT CHANGE UP (ref 32–36)
MCHC RBC-ENTMCNC: 37 PG — HIGH (ref 27–34)
MCV RBC AUTO: 111.9 FL — HIGH (ref 80–100)
MONOCYTES # BLD AUTO: 0.23 K/UL — SIGNIFICANT CHANGE UP (ref 0–0.9)
MONOCYTES NFR BLD AUTO: 1 % — LOW (ref 2–14)
NEUTROPHILS # BLD AUTO: 2.57 K/UL — SIGNIFICANT CHANGE UP (ref 1.8–7.4)
NEUTROPHILS NFR BLD AUTO: 11 % — LOW (ref 43–77)
NRBC # BLD: 0 /100 WBCS — SIGNIFICANT CHANGE UP (ref 0–0)
NRBC # BLD: SIGNIFICANT CHANGE UP /100 WBCS (ref 0–0)
PLAT MORPH BLD: NORMAL — SIGNIFICANT CHANGE UP
PLATELET # BLD AUTO: 255 K/UL — SIGNIFICANT CHANGE UP (ref 150–400)
POTASSIUM SERPL-MCNC: 3.9 MMOL/L — SIGNIFICANT CHANGE UP (ref 3.5–5.3)
POTASSIUM SERPL-SCNC: 3.9 MMOL/L — SIGNIFICANT CHANGE UP (ref 3.5–5.3)
PROT SERPL-MCNC: 5.8 GM/DL — LOW (ref 6–8.3)
RBC # BLD: 2.43 M/UL — LOW (ref 4.2–5.8)
RBC # FLD: 13.7 % — SIGNIFICANT CHANGE UP (ref 10.3–14.5)
RBC BLD AUTO: ABNORMAL
SMUDGE CELLS # BLD: PRESENT — SIGNIFICANT CHANGE UP
SODIUM SERPL-SCNC: 138 MMOL/L — SIGNIFICANT CHANGE UP (ref 135–145)
TM INTERPRETATION: SIGNIFICANT CHANGE UP
VARIANT LYMPHS # BLD: 2 % — SIGNIFICANT CHANGE UP (ref 0–6)
WBC # BLD: 23.38 K/UL — HIGH (ref 3.8–10.5)
WBC # FLD AUTO: 23.38 K/UL — HIGH (ref 3.8–10.5)

## 2024-10-17 PROCEDURE — 99232 SBSQ HOSP IP/OBS MODERATE 35: CPT

## 2024-10-17 PROCEDURE — 74177 CT ABD & PELVIS W/CONTRAST: CPT | Mod: 26

## 2024-10-17 RX ORDER — AMPICILLIN AND SULBACTAM 2; 1 G/1; G/1
3 INJECTION, POWDER, FOR SOLUTION INTRAMUSCULAR; INTRAVENOUS EVERY 6 HOURS
Refills: 0 | Status: COMPLETED | OUTPATIENT
Start: 2024-10-17 | End: 2024-10-17

## 2024-10-17 RX ORDER — SENNA 187 MG
2 TABLET ORAL AT BEDTIME
Refills: 0 | Status: DISCONTINUED | OUTPATIENT
Start: 2024-10-17 | End: 2024-10-24

## 2024-10-17 RX ORDER — LEVOTHYROXINE SODIUM 88 MCG
1 TABLET ORAL
Qty: 0 | Refills: 0 | DISCHARGE
Start: 2024-10-17

## 2024-10-17 RX ADMIN — PIPERACILLIN AND TAZOBACTAM 25 GRAM(S): .5; 4 INJECTION, POWDER, LYOPHILIZED, FOR SOLUTION INTRAVENOUS at 00:13

## 2024-10-17 RX ADMIN — SODIUM CHLORIDE 125 MILLILITER(S): 9 INJECTION, SOLUTION INTRAMUSCULAR; INTRAVENOUS; SUBCUTANEOUS at 21:46

## 2024-10-17 RX ADMIN — AMPICILLIN AND SULBACTAM 200 GRAM(S): 2; 1 INJECTION, POWDER, FOR SOLUTION INTRAMUSCULAR; INTRAVENOUS at 16:34

## 2024-10-17 RX ADMIN — OXYCODONE HYDROCHLORIDE 5 MILLIGRAM(S): 30 TABLET ORAL at 21:43

## 2024-10-17 RX ADMIN — VANCOMYCIN HYDROCHLORIDE 250 MILLIGRAM(S): KIT at 12:19

## 2024-10-17 RX ADMIN — Medication 75 MICROGRAM(S): at 06:25

## 2024-10-17 RX ADMIN — PIPERACILLIN AND TAZOBACTAM 25 GRAM(S): .5; 4 INJECTION, POWDER, LYOPHILIZED, FOR SOLUTION INTRAVENOUS at 08:01

## 2024-10-17 RX ADMIN — Medication 1 MILLIGRAM(S): at 17:52

## 2024-10-17 RX ADMIN — DULOXETINE HYDROCHLORIDE 60 MILLIGRAM(S): 30 CAPSULE, DELAYED RELEASE ORAL at 12:18

## 2024-10-17 RX ADMIN — TRAZODONE HYDROCHLORIDE 100 MILLIGRAM(S): 100 TABLET ORAL at 21:41

## 2024-10-17 RX ADMIN — Medication 10 MILLIGRAM(S): at 21:42

## 2024-10-17 RX ADMIN — CHLORHEXIDINE GLUCONATE 1 APPLICATION(S): 40 SOLUTION TOPICAL at 08:03

## 2024-10-17 RX ADMIN — POLYETHYLENE GLYCOL 3350 17 GRAM(S): 17 POWDER, FOR SOLUTION ORAL at 12:18

## 2024-10-17 RX ADMIN — TIOTROPIUM BROMIDE AND OLODATEROL 2 PUFF(S): 3.124; 2.736 SPRAY, METERED RESPIRATORY (INHALATION) at 09:36

## 2024-10-17 RX ADMIN — AMPICILLIN AND SULBACTAM 200 GRAM(S): 2; 1 INJECTION, POWDER, FOR SOLUTION INTRAMUSCULAR; INTRAVENOUS at 21:42

## 2024-10-17 RX ADMIN — Medication 10 MILLIGRAM(S): at 12:19

## 2024-10-17 RX ADMIN — Medication 1 MILLIGRAM(S): at 13:35

## 2024-10-17 RX ADMIN — SODIUM CHLORIDE 125 MILLILITER(S): 9 INJECTION, SOLUTION INTRAMUSCULAR; INTRAVENOUS; SUBCUTANEOUS at 08:02

## 2024-10-17 RX ADMIN — Medication 2 TABLET(S): at 21:41

## 2024-10-17 RX ADMIN — OXYCODONE HYDROCHLORIDE 5 MILLIGRAM(S): 30 TABLET ORAL at 06:25

## 2024-10-17 NOTE — PROGRESS NOTE ADULT - SUBJECTIVE AND OBJECTIVE BOX
Date of service: 10-17-24 @ 10:23    pt seen and examined  s/p I & D of scrotal abscess 10/13  packing in place; has scrotal discomfort  afebrile    ROS: no fever or chills; denies dizziness, no HA, no SOB or cough, no abdominal pain, no diarrhea or constipation; no legs pain, no rashes      MEDICATIONS  (STANDING):  chlorhexidine 4% Liquid 1 Application(s) Topical <User Schedule>  DULoxetine 60 milliGRAM(s) Oral daily  influenza   Vaccine 0.5 milliLiter(s) IntraMuscular once  levothyroxine 75 MICROGram(s) Oral daily  piperacillin/tazobactam IVPB.. 3.375 Gram(s) IV Intermittent every 8 hours  polyethylene glycol 3350 17 Gram(s) Oral daily  predniSONE   Tablet 10 milliGRAM(s) Oral daily  sodium chloride 0.9%. 1000 milliLiter(s) (125 mL/Hr) IV Continuous <Continuous>  sodium chloride 0.9%. 750 milliLiter(s) (75 mL/Hr) IV Continuous <Continuous>  tiotropium 2.5 MICROgram(s)/olodaterol 2.5 MICROgram(s) (STIOLTO) Inhaler 2 Puff(s) Inhalation daily  traZODone 100 milliGRAM(s) Oral at bedtime  vancomycin  IVPB 750 milliGRAM(s) IV Intermittent every 12 hours    Vital Signs Last 24 Hrs  T(C): 36.3 (17 Oct 2024 09:28), Max: 36.6 (16 Oct 2024 21:55)  T(F): 97.4 (17 Oct 2024 09:28), Max: 97.9 (16 Oct 2024 21:55)  HR: 58 (17 Oct 2024 09:45) (56 - 68)  BP: 100/71 (17 Oct 2024 09:28) (98/65 - 113/83)  BP(mean): --  RR: 18 (17 Oct 2024 09:28) (16 - 18)  SpO2: 92% (17 Oct 2024 09:45) (92% - 98%)    Parameters below as of 17 Oct 2024 09:45  Patient On (Oxygen Delivery Method): nasal cannula, 3l      PE:  Constitutional: NAD  HEENT: NC/AT, EOMI, PERRLA, conjunctivae clear; ears and nose atraumatic; pharynx benign  Neck: supple; thyroid not palpable  Back: no tenderness  Respiratory: respiratory effort normal; clear to auscultation  Cardiovascular: S1S2 regular, no murmurs  Abdomen: soft, not tender, not distended, positive BS; liver and spleen WNL  Genitourinary: no suprapubic tenderness; scrotal edema tenderness warmth erythema   Lymphatic: no LN palpable  Musculoskeletal: no muscle tenderness, no joint swelling or tenderness  Extremities: no pedal edema  Neurological/ Psychiatric: AxOx3, Judgement and insight normal;  moving all extremities  Skin: no rashes; no palpable lesions    Labs: all available labs reviewed                                              9.0    23.38 )-----------( 255      ( 17 Oct 2024 06:49 )             27.2     10-17    138  |  104  |  14  ----------------------------<  87  3.9   |  30  |  0.90    Ca    8.9      17 Oct 2024 06:49    TPro  5.8[L]  /  Alb  2.4[L]  /  TBili  0.2  /  DBili  x   /  AST  20  /  ALT  28  /  AlkPhos  68  10-17      Urinalysis Basic - ( 13 Oct 2024 07:54 )    Color: x / Appearance: x / SG: x / pH: x  Gluc: 105 mg/dL / Ketone: x  / Bili: x / Urobili: x   Blood: x / Protein: x / Nitrite: x   Leuk Esterase: x / RBC: x / WBC x   Sq Epi: x / Non Sq Epi: x / Bacteria: x    Culture - Abscess with Gram Stain (10.13.24 @ 14:35)   Gram Stain:   No polymorphonuclear cells seen per low power field   MRSA  Urinalysis with Rflx Culture (10.13.24 @ 05:05)   Urine Appearance: Clear  Color: Yellow  Specific Gravity: >1.030  pH Urine: 6.5  Protein, Urine: Negative mg/dL  Glucose Qualitative, Urine: Negative mg/dL  Ketone - Urine: Negative mg/dL  Blood, Urine: Negative  Bilirubin: Negative  Urobilinogen: 0.2 mg/dL  Leukocyte Esterase Concentration: Negative  Nitrite: Negative    Radiology: all available radiological tests reviewed      IMPRESSION:  Partially visualized moderate sized complex collection in the right   scrotal wall, better depicted on the CT of the pelvis from earlier the   same day, compatible with an abscess.        IMPRESSION:  Large multilocular tubular rim-enhancing fluid collection in the right   hemiscrotum measuring up to 3 cm in diameter with pigtail-like extension   extending along the right spermatic cord. Collection extends to the skin   surface at the level of the base of the penis. Scrotal wall edema.   Constellation of findings and clinical history are most compatible with   an abscess.    Nonspecific mildly dilated fluid-filled small bowel loops in the right   lower abdomen.    Advanced directives addressed: full resuscitation

## 2024-10-17 NOTE — PHARMACOTHERAPY INTERVENTION NOTE - COMMENTS
Contacted Dr. Berg (ID) to suggest alternative therapy for Zosyn. Patient was on Zosyn for 4 days. The abscess culture on 10/13 was positive for MRSA; no growth in blood from 10/13. Therefore, recommended to reduce the use of an antipseudomonal agent by discontinuing Zosyn and to recommend Unasyn to provide Gram + coverage for dog bite/scrotal abscess.

## 2024-10-17 NOTE — DISCHARGE NOTE PROVIDER - CARE PROVIDER_API CALL
Bri Sun.  Family Medicine  [address] [place] [address]  Phone: [phone]  Fax: [phone]  Follow Up Time: 1 week   Bri Sun  Family Medicine  [address] [place] [address]  Phone: [phone]  Fax: [phone]  Follow Up Time: 1 week    Kwadwo Caro  Urology  [address] [address] [facility] [address]  [place] [address]  Phone: [phone]  Fax: [phone]  Follow Up Time: 2 weeks    Valorie Plunkett  Hematology  [address] [facility], [place] [address]  Phone: [phone]  Fax: [phone]  Follow Up Time: 2 weeks

## 2024-10-17 NOTE — DISCHARGE NOTE PROVIDER - PROVIDER TOKENS
PROVIDER:[TOKEN:[[id number]],FOLLOWUP:[1 week]] PROVIDER:[TOKEN:[[id number]],FOLLOWUP:[1 week]],PROVIDER:[TOKEN:[[id number]],FOLLOWUP:[2 weeks]],PROVIDER:[TOKEN:[[id number]],FOLLOWUP:[2 weeks]]

## 2024-10-17 NOTE — DISCHARGE NOTE PROVIDER - CARE PROVIDERS DIRECT ADDRESSES
,DirectAddress_Unknown ,DirectAddress_Unknown,[email or web address]

## 2024-10-17 NOTE — PROGRESS NOTE ADULT - SUBJECTIVE AND OBJECTIVE BOX
Patient seen at bedside, pt s/p scrotal abscess I&D. Patient reports he feels well and notes some pain at the incision site but denies any fevers, chills.    PE  Vital Signs Last 24 Hrs  T(C): 36.3 (17 Oct 2024 09:28), Max: 36.6 (16 Oct 2024 21:55)  T(F): 97.4 (17 Oct 2024 09:28), Max: 97.9 (16 Oct 2024 21:55)  HR: 58 (17 Oct 2024 09:45) (56 - 68)  BP: 100/71 (17 Oct 2024 09:28) (98/65 - 113/83)  BP(mean): --  RR: 18 (17 Oct 2024 09:28) (16 - 18)  SpO2: 92% (17 Oct 2024 09:45) (92% - 98%)    Parameters below as of 17 Oct 2024 09:45  Patient On (Oxygen Delivery Method): nasal cannula, 3l       Lung    : No resp distress  Abdo:   : Soft, Non tender, No guarding, mild lower abd distension   Back    : No CVAT b/l  Extremity: No calf tenderness   Genitalia Male: right sided scrotal incision with packing and gauze dressing in pace with some blood but no active bleed, No crepitation, No fluctuance   Neuro   : A&Ox3      LABS                           9.0    23.38 )-----------( 255      ( 17 Oct 2024 06:49 )             27.2     10-17    138  |  104  |  14  ----------------------------<  87  3.9   |  30  |  0.90    Ca    8.9      17 Oct 2024 06:49    TPro  5.8[L]  /  Alb  2.4[L]  /  TBili  0.2  /  DBili  x   /  AST  20  /  ALT  28  /  AlkPhos  68  10-17

## 2024-10-17 NOTE — PROGRESS NOTE ADULT - ASSESSMENT
65 y/o M w/ PMH of COPD, CLL, lyme disease, hypothyoridism, cardiac arrest, p/w scrotal pain. Patient states that five days ago a dog jumped on him and cause a small puncture wound to his scrotum. Initially, it was only a small red leslie, but then it started to progress over time and increased in swelling and pain.  Patient also, complains about intermittent abdominal discomfort, but is vague about the symptoms. Denies, nausea, vomiting, chest pain, shortness of breath, cough, runny nose, sore throat. As per ED physician, patient was seen by the surgery p a covering for urology team in the emergency room. Wbc ct 29 UA positive, imaging shows Large multilocular tubular rim-enhancing fluid collection in the right hemiscrotum measuring up to 3 cm in diameter with pigtail-like extension extending along the right spermatic cord. Collection extends to the skin surface at the level of the base of the penis. Scrotal wall edema. Started on IV abx, urology consulted.     1. Severe scrotal abscess/cellulitis. Pyuria. Complicated UTI. CLL. Leukocytosis. s/p Dog bite  - imaging reviewed   - dc vancomycin/cefepime  - on IV zosyn [id number]  #5  - on vancomycin  [id number] #4 f/u vancomycin trough  - continue with antibiotic coverage   - f/u urine cx blood cx no growth, abscess cx growing MRSA  - urology eval appreciated s/p I & D    - tolerating abx well so far; no side effects noted  - reason for abx use and side effects reviewed with patient  - supportive care  - fu cbc    Clinical team may change from intravenous to oral antibiotics when the following criteria are met:   1. Patient is clinically improving/stable       a)	Improved signs and symptoms of infection from initial presentation       b)	Afebrile for 24 hours       c)	Leukocytosis trending towards normal range   2. Patient is tolerating oral intake   3. Initial/repeat blood cultures are negative     when above met change to po bactrim 1 DS BID x 14-21 day course

## 2024-10-17 NOTE — PROGRESS NOTE ADULT - ASSESSMENT
63 yo male s/p Incision and drainage of scrotal abscess. Doing well post op.  Packing in place. Sutures were removed and packing was removed. Wet guaze was placed over wound with dry dressing on top and abdominal pad and underwear on top to hold it in place.   Continue antibiotics, adjust as per cultures/ sensitivities    Continue Scrotal elevation  F/U CT scan for pt with abd distention   Follow up with Dr. Herring outpt wound check    Case discussed with Dr. Herring

## 2024-10-17 NOTE — PROGRESS NOTE ADULT - SUBJECTIVE AND OBJECTIVE BOX
Patient is a 64y old  Male who presents with a chief complaint of scrotal pain (17 Oct 2024 10:22)      INTERVAL HPI/OVERNIGHT EVENTS: Pt seen and examined at bedside. Admits to lower abd pain and bloating, as well as discomfort of scrotum. Reports he has not had BM, but passed gas yesterday after Miralax use. Dulcolax ordered. CT a/p w contrast ordered and Uro contacted for evaluation.     MEDICATIONS  (STANDING):  bisacodyl 5 milliGRAM(s) Oral at bedtime  chlorhexidine 4% Liquid 1 Application(s) Topical <User Schedule>  DULoxetine 60 milliGRAM(s) Oral daily  influenza   Vaccine 0.5 milliLiter(s) IntraMuscular once  levothyroxine 75 MICROGram(s) Oral daily  piperacillin/tazobactam IVPB.. 3.375 Gram(s) IV Intermittent every 8 hours  polyethylene glycol 3350 17 Gram(s) Oral daily  predniSONE   Tablet 10 milliGRAM(s) Oral daily  sodium chloride 0.9%. 1000 milliLiter(s) (125 mL/Hr) IV Continuous <Continuous>  sodium chloride 0.9%. 750 milliLiter(s) (75 mL/Hr) IV Continuous <Continuous>  tiotropium 2.5 MICROgram(s)/olodaterol 2.5 MICROgram(s) (STIOLTO) Inhaler 2 Puff(s) Inhalation daily  traZODone 100 milliGRAM(s) Oral at bedtime  vancomycin  IVPB 750 milliGRAM(s) IV Intermittent every 12 hours    MEDICATIONS  (PRN):  acetaminophen     Tablet .. 650 milliGRAM(s) Oral every 6 hours PRN Temp greater or equal to 38C (100.4F), Mild Pain (1 - 3)  albuterol    90 MICROgram(s) HFA Inhaler 2 Puff(s) Inhalation every 6 hours PRN Bronchospasm  HYDROmorphone  Injectable 1 milliGRAM(s) IV Push every 4 hours PRN Severe Pain (7 - 10)  ondansetron Injectable 4 milliGRAM(s) IV Push every 6 hours PRN Nausea and/or Vomiting  oxyCODONE    IR 5 milliGRAM(s) Oral every 4 hours PRN Moderate Pain (4 - 6)      Allergies    No Known Allergies    Intolerances        REVIEW OF SYSTEMS:  CONSTITUTIONAL: No fever or chills  HEENT:  No headache, no sore throat  RESPIRATORY: No cough, wheezing, or shortness of breath  CARDIOVASCULAR: No chest pain, palpitations  GASTROINTESTINAL: + lower abd pain and bloating, +constipation, +passing gas, no nausea or vomiting  GENITOURINARY: +discomfort of scrotum; No dysuria, frequency, or hematuria  NEUROLOGICAL: no focal weakness or dizziness  MUSCULOSKELETAL: no myalgias     Vital Signs Last 24 Hrs  T(C): 36.3 (17 Oct 2024 09:28), Max: 36.6 (16 Oct 2024 21:55)  T(F): 97.4 (17 Oct 2024 09:28), Max: 97.9 (16 Oct 2024 21:55)  HR: 58 (17 Oct 2024 09:45) (56 - 68)  BP: 100/71 (17 Oct 2024 09:28) (98/65 - 113/83)  BP(mean): --  RR: 18 (17 Oct 2024 09:28) (16 - 18)  SpO2: 92% (17 Oct 2024 09:45) (92% - 98%)    Parameters below as of 17 Oct 2024 09:45  Patient On (Oxygen Delivery Method): nasal cannula, 3l        PHYSICAL EXAM:  GENERAL: NAD  HEENT: anicteric, moist mucous membranes  CHEST/LUNG:  CTA b/l, no rales, wheezes, or rhonchi  HEART:  RRR, S1, S2  ABDOMEN: +tender to palpation of b/l lower quadrants, +distended  : scrotal abscess s/p I&D, dressings in place  EXTREMITIES: no edema, cyanosis, or calf tenderness  NERVOUS SYSTEM: answers questions and follows commands appropriately      LABS:                        9.0    23.38 )-----------( 255      ( 17 Oct 2024 06:49 )             27.2     CBC Full  -  ( 17 Oct 2024 06:49 )  WBC Count : 23.38 K/uL  Hemoglobin : 9.0 g/dL  Hematocrit : 27.2 %  Platelet Count - Automated : 255 K/uL  Mean Cell Volume : 111.9 fl  Mean Cell Hemoglobin : 37.0 pg  Mean Cell Hemoglobin Concentration : 33.1 gm/dL  Auto Neutrophil # : 2.57 K/uL  Auto Lymphocyte # : 19.64 K/uL  Auto Monocyte # : 0.23 K/uL  Auto Eosinophil # : 0.47 K/uL  Auto Basophil # : 0.00 K/uL  Auto Neutrophil % : 11.0 %  Auto Lymphocyte % : 84.0 %  Auto Monocyte % : 1.0 %  Auto Eosinophil % : 2.0 %  Auto Basophil % : 0.0 %    17 Oct 2024 06:49    138    |  104    |  14     ----------------------------<  87     3.9     |  30     |  0.90     Ca    8.9        17 Oct 2024 06:49    TPro  5.8    /  Alb  2.4    /  TBili  0.2    /  DBili  x      /  AST  20     /  ALT  28     /  AlkPhos  68     17 Oct 2024 06:49      Urinalysis Basic - ( 17 Oct 2024 06:49 )    Color: x / Appearance: x / SG: x / pH: x  Gluc: 87 mg/dL / Ketone: x  / Bili: x / Urobili: x   Blood: x / Protein: x / Nitrite: x   Leuk Esterase: x / RBC: x / WBC x   Sq Epi: x / Non Sq Epi: x / Bacteria: x      CAPILLARY BLOOD GLUCOSE            Culture - Abscess with Gram Stain (collected 10-13-24 @ 14:35)  Source: .Abscess  Gram Stain (10-14-24 @ 00:09):    No polymorphonuclear cells seen per low power field    Few Gram Positive Cocci in Clusters seen per oil power field  Preliminary Report (10-15-24 @ 17:05):    Numerous Methicillin Resistant Staphylococcus aureus  Organism: Methicillin resistant Staphylococcus aureus (10-15-24 @ 17:03)  Organism: Methicillin resistant Staphylococcus aureus (10-15-24 @ 17:03)      -  Clindamycin: S <=0.25      -  Oxacillin: R >2      -  Gentamicin: S <=1 Should not be used as monotherapy      -  Daptomycin: S 0.5      -  Linezolid: S 2      -  Vancomycin: S 2      -  Tetracycline: S <=1      Method Type: HARVINDER      -  Penicillin: R >8      -  Rifampin: S <=1 Should not be used as monotherapy      -  Erythromycin: R >4      -  Trimethoprim/Sulfamethoxazole: S <=0.5/9.5    Urinalysis with Rflx Culture (collected 10-13-24 @ 05:05)    Culture - Blood (collected 10-13-24 @ 01:49)  Source: .Blood BLOOD  Preliminary Report (10-17-24 @ 11:01):    No growth at 4 days        RADIOLOGY & ADDITIONAL TESTS:    Personally reviewed.     Consultant(s) Notes Reviewed:  [x] YES  [ ] NO

## 2024-10-17 NOTE — PROGRESS NOTE ADULT - ASSESSMENT
63 y/o M w/ PMH of COPD, CLL, lyme disease, hypothyoridism, cardiac arrest, p/w scrotal pain. Patient states that five days ago a dog jumped on him and cause a small puncture wound to his scrotum. Initially, it was only a small red leslie, but then it started to progress over time and increased in swelling and pain. Patient denies any fever or chills. Patient also, complains about intermittent abdominal discomfort, but is vague about the symptoms. Debra, nausea, vomiting, chest pain, shortness of breath, cough, runny nose, sore throat. As per ED physician, patient was seen by the surgery pa covering for urology team in the emergency room    #Lower abd pain  #Abd distention  #Constipation  - CT a/p with contrast ordered  - Bowel regimen  - Uro following    #Scrotal abscess  - S/p I&D of scrotal abscess  - Blood cx negative  - Abscess fluid cx MRSA+   - Continue IV Vanco & Zosyn   - Scrotal elevation  - ID consulted, recs appreciated - change to po bactrim 1 DS BID x 14-21 day course on DC    #Hypotension  - Asymptomatic  - Continue IV NS maintenance @ 125cc/hr    #Leukocytosis  - Hx of CLL, unknown baseline WBC  - Monitor CBC    #Hypothyroid  - Continue Levothyroxine    #Anemia  - Likely anemia of chronic disease  - Hgb low, stable    #COPD  - Continue Spiriva  - Continue Prednisone    #Depression  - Continue Duloxetine    #Insomnia  - Continue Trazodone    #DVT ppx  - SCDs    #Dispo: Obtained [facility] nielson this afternoon, DC planning pending result of CT and pending Uro recs.

## 2024-10-17 NOTE — DISCHARGE NOTE PROVIDER - NSDCPNSUBOBJ_GEN_ALL_CORE
VITALS:  T(F): 98.1 (10-24-24 @ 14:00), Max: 98.6 (10-23-24 @ 15:23)  HR: 66 (10-24-24 @ 14:00) (60 - 79)  BP: 98/67 (10-24-24 @ 14:00) (88/48 - 98/68)  RR: 18 (10-24-24 @ 14:00) (18 - 18)  SpO2: 97% (10-24-24 @ 14:00) (91% - 97%)    PHYSICAL EXAM:  Gen: NAD  HEENT:  pupils equal and reactive, EOMI, no oropharyngeal lesions, erythema, exudates, oral thrush  NECK:   supple, no carotid bruits, no palpable lymph nodes, no thyromegaly  CV:  +S1, +S2, regular, no murmurs or rubs  RESP:   lungs clear to auscultation bilaterally, no wheezing, rales, rhonchi, good air entry bilaterally  BREAST:  not examined  GI:  abdomen soft, non-tender, non-distended, normal BS, no bruits, no abdominal masses, no palpable masses  RECTAL:  not examined  :  not examined  MSK:   normal muscle tone, no atrophy, no rigidity, no contractions  EXT:  no clubbing, no cyanosis, no edema, no calf pain, swelling or erythema  VASCULAR:  pulses equal and symmetric in the upper and lower extremities  NEURO:  AAOX3, no focal neurological deficits, follows all commands, able to move extremities spontaneously  SKIN:  no ulcers, lesions or rashes      #Scrotal abscess.   - S/p I&D of scrotal abscess on 10/13. Abscess fluid cx MRSA+. Patient taken back to the OR on 10/18 for I&D. F/U intra op cultures from 10/18 with NGTD. On 10/21 patient noted to have induration and edema tracking up the right inguinal area, repeat I&D on 10/21  -s/p iv abx w vanc, unasyn. D/c on bactrim for 21 days to complete course  - ID on board, appreciate recs  -appreciate urology recs  -daily dressing changes  - Cont to monitor     #Lower abd pain, improved  #Abd distention, improved  - CT a/p with contrast on 10/17 with no e/o obstruction. Symptoms improved with passing of BM.  - Bowel regimen  - Gen Surg input appreciated  - Uro following    #Hypotension  - Asymptomatic  - S/p IVF Hydration  - Midodrine 5 mg TID     #Leukocytosis in patient with Hx of CLL  - Hx of CLL, unknown baseline WBC--has not followed up in years  - Monitor CBC    #Hypothyroid  - Continue Levothyroxine    #Anemia  - Likely anemia of chronic disease  - Hgb low, stable    #COPD/Hypoxia  - stiolto  - Continue Prednisone 10 daily  - CTA neg for PE  - Incentive spirometry    #Depression  - Continue Duloxetine    #Insomnia  - Continue Trazodone    for [facility]

## 2024-10-17 NOTE — DISCHARGE NOTE PROVIDER - HOSPITAL COURSE
ADMISSION H+P:    HPI:  65 y/o M w/ PMH of COPD, CLL, lyme disease, hypothyoridism, cardiac arrest, p/w scrotal pain. Patient states that five days ago a dog jumped on him and cause a small puncture wound to his scrotum. Initially, it was only a small red leslie, but then it started to progress over time and increased in swelling and pain. Patient denies any fever or chills. Patient also, complains about intermittent abdominal discomfort, but is vague about the symptoms. Debra, nausea, vomiting, chest pain, shortness of breath, cough, runny nose, sore throat. As per ED physician, patient was seen by the surgery p a covering for urology team in the emergency room.       PSH: Shoulder surgery, sinus surgery     Social Hx: Denies tobacco / etoh, h/o cocaine use     Family Hx: Father - alzheimer's disease, mother - brain / lung cancer    (13 Oct 2024 05:36)      ---  HOSPITAL COURSE:   Pt was admitted for scrotal abscess. Urology was consulted. Incision and drainage of scrotum was done. Abscess fluid culture was positive for MRSA. Pt was managed with IV antibiotics which were switched to oral upon discharge. Surgery was consulted for non specific CT findings of mildly dilated fluid-filled small bowel loops in the right lower abdomen. Surgery recommended no surgical intervention.     Patient was medically optimized and improved clinically throughout hospital course. Patient seen and examined on day of discharge.    Vital Signs  T(C): 36.6 (17 Oct 2024 15:51), Max: 36.6 (16 Oct 2024 21:55)  T(F): 97.8 (17 Oct 2024 15:51), Max: 97.9 (16 Oct 2024 21:55)  HR: 62 (17 Oct 2024 15:51) (56 - 68)  BP: 102/66 (17 Oct 2024 15:51) (98/65 - 113/83)  RR: 18 (17 Oct 2024 15:51) (16 - 18)  SpO2: 94% (17 Oct 2024 15:51) (92% - 98%)    Physical Exam:  GENERAL: NAD  HEENT: anicteric, moist mucous membranes  CHEST/LUNG:  CTA b/l, no rales, wheezes, or rhonchi  HEART:  RRR, S1, S2  ABDOMEN: +tender to palpation of b/l lower quadrants, +distended  : scrotal abscess s/p I&D, dressings in place  EXTREMITIES: no edema, cyanosis, or calf tenderness  NERVOUS SYSTEM: answers questions and follows commands appropriately    Patient is medically stable for discharge to [facility] with outpatient follow up.  ---  CONSULTANTS:   Urology  Heme/Onc  Surgery  ID  ---  TIME SPENT:   I, the attending physician, was physically present for the key portions of the evaluation and management (E/M) service provided. The total amount of time spent reviewing the hospital course, laboratory values, imaging findings, assessing/counseling the patient, discussing with consultant physicians, social work, nursing staff was -- minutes.     ---  FINAL DISCHARGE DIAGNOSIS LIST:  Please see last daily progress note for final discharge diagnoses    ---  Primary care provider was made aware of plan for discharge:  [    ] NO     [     ] YES       CC: scrotal pain  HPI and Hospital Course: 65 y/o M w/ PMH of COPD, CLL, lyme disease, hypothyroidism, cardiac arrest, p/w scrotal pain. Patient states that five days ago a dog jumped on him and cause a small puncture wound to his scrotum. Initially, it was only a small red leslie, but then it started to progress over time and increased in swelling and pain. Underwent I&D x 3 of scrotal abscess, managed with iv abx, w improvement. D/c'd on oral abx with daily dressing changes for outpt f/u with urology. Also given IVIg x 1 given CLL, for outpt f/u with heme onc.     D/c to [facility].  I have spent 32 min on day of d/c coordinating care.  See progress note for problem based plan.

## 2024-10-17 NOTE — CONSULT NOTE ADULT - ASSESSMENT
64 yr/old r/o ileus secondary scrotal abcess  - pt has persistent collection right inguinal region proximal to previous surgical site   - d/w Dr Herring to take pt back to OR tomorrrow for Incision drain  - if persistent abd distension will require NGT  ambulation   D/w DR Lentz  Hyperglycemia due to type 2 diabetes mellitus Elevated troponin level

## 2024-10-17 NOTE — DISCHARGE NOTE PROVIDER - NSDCCPCAREPLAN_GEN_ALL_CORE_FT
PRINCIPAL DISCHARGE DIAGNOSIS  Diagnosis: Abscess of scrotum  Assessment and Plan of Treatment: You had incision and drainage of your scrotum abscess. You were treated with IV antibiotics which were switched to oral upon discharge. Start Bactrim twice daily. Follow up with your PCP within 1 week of discharge.      SECONDARY DISCHARGE DIAGNOSES  Diagnosis: Hypothyroidism  Assessment and Plan of Treatment: You were started on Levothyroxine for hypothyroidism. Start Levothyroxine 75mcg daily. Follow up with your PCP within 1 week of discharge for repeat thyroid function tests.     PRINCIPAL DISCHARGE DIAGNOSIS  Diagnosis: Abscess of scrotum  Assessment and Plan of Treatment: You had incision and drainage of your scrotum abscess. You were treated with IV antibiotics which were switched to oral upon discharge. Take bactrim x 21 days, follow up with urology.      SECONDARY DISCHARGE DIAGNOSES  Diagnosis: Hypothyroidism  Assessment and Plan of Treatment: Take levothyroxine. Get repeat thyroid function tests in 4 weeks.    Diagnosis: CLL (chronic lymphocytic leukemia)  Assessment and Plan of Treatment: Follow up with Dr. Castaneda

## 2024-10-17 NOTE — DISCHARGE NOTE PROVIDER - ATTENDING DISCHARGE PHYSICAL EXAMINATION:
GENERAL: NAD  HEENT: anicteric, moist mucous membranes  CHEST/LUNG:  CTA b/l, no rales, wheezes, or rhonchi  HEART:  RRR, S1, S2  ABDOMEN: +tender to palpation of b/l lower quadrants, +distended  : scrotal abscess s/p I&D, dressings in place  EXTREMITIES: no edema, cyanosis, or calf tenderness  NERVOUS SYSTEM: answers questions and follows commands appropriately

## 2024-10-17 NOTE — CONSULT NOTE ADULT - SUBJECTIVE AND OBJECTIVE BOX
64 yr/old s/p scrotal abcess after being attacked by a dog aprox  2 weeks ago, increased pain swelling, afebrile increased abdominal pain pressure, minimal flatus - bm  pt has reproducible pain right inguinal region representing collection vs seroma    Right inguinal region+ erythematous ascending collection  from previous surgical site    	                        9.0    23.38 )-----------( 255      ( 17 Oct 2024 06:49 )             27.2       10-17    138  |  104  |  14  ----------------------------<  87  3.9   |  30  |  0.90    Ca    8.9      17 Oct 2024 06:49    TPro  5.8[L]  /  Alb  2.4[L]  /  TBili  0.2  /  DBili  x   /  AST  20  /  ALT  28  /  AlkPhos  68  10-17    Type and Screen     T(C): 36.6 (10-17-24 @ 15:51), Max: 36.6 (10-16-24 @ 21:55)  HR: 62 (10-17-24 @ 15:51) (56 - 68)  BP: 102/66 (10-17-24 @ 15:51) (98/65 - 113/83)  RR: 18 (10-17-24 @ 15:51) (16 - 18)  SpO2: 94% (10-17-24 @ 15:51) (92% - 98%)  Wt(kg): --  < from: CT Abdomen and Pelvis w/ Oral Cont and w/ IV Cont (10.17.24 @ 15:18) >      INTERPRETATION:  CLINICAL INFORMATION: Abdominal pain and distention.    COMPARISON: CT pelvis 10/13/2024, CT abdomen and pelvis 6/28/2022    CONTRAST/COMPLICATIONS:  IV Contrast: Omnipaque 350  90 cc administered   0 cc discarded  Oral Contrast: Omnipaque 300  Complications: None reported at time of study completion    PROCEDURE:  CT of the Abdomen and Pelvis was performed.  Sagittal and coronal reformats were performed.    FINDINGS:  LOWER CHEST: Bilateral pleural effusions. Complete atelectasis of the   visualized left lower lobe.    LIVER: Within normal limits.  BILE DUCTS: Normal caliber.  GALLBLADDER: Within normal limits.  SPLEEN: Within normal limits.  PANCREAS: Within normal limits.  ADRENALS: Within normal limits.  KIDNEYS/URETERS: Within normal limits.    BLADDER: Within normal limits.  REPRODUCTIVE ORGANS: Normal sized prostate gland. Status post incision   and drainage of the right scrotal abscess seen on recent CT. Small right   hydrocele.    BOWEL: Diffuse mild distention of small bowel loops, decrease in caliber   compared with recent pelvic CT 10/13/2024 and without transition point to   suggest bowel obstruction, likely representing a mild evolving ileus. Few   small bowel loops in the left hemipelvis are decompressed. The distal   terminal ileum are mildly distended with fluid. Prominent stool burden in   the colon. Stomach is distended with contrast.  PERITONEUM/RETROPERITONEUM: Within normal limits.  VESSELS: Within normal limits.  LYMPH NODES: No lymphadenopathy.  ABDOMINAL WALL: Postsurgical changes in the right scrotum.  BONES: Degenerative changes at L4-L5 with grade 1 anterolisthesisof L4   on L5.    IMPRESSION:  Diffuse mild distention of small bowel loops, decreased in caliber   compared with recent pelvic CT 10/13/2024 and without transition point to   suggest bowel obstruction, likely representing a mild evolving ileus.    Small bilateral pleural effusions. Complete atelectasis of the visualized   left lower lobe.    Status post incision and drainage of the previously seen right scrotal     < end of copied text >

## 2024-10-17 NOTE — DISCHARGE NOTE PROVIDER - NSDCCAREPROVSEEN_GEN_ALL_CORE_FT
Aidan, Summer Kwadwo Caro (urology)  Radha Berg (ID)  Hortencia Murray (hospital medicine)  Aimee Ospina (oncology)

## 2024-10-17 NOTE — DISCHARGE NOTE PROVIDER - NSDCMRMEDTOKEN_GEN_ALL_CORE_FT
Albuterol (Eqv-ProAir HFA) 90 mcg/inh inhalation aerosol: 2 puff(s) inhaled every 6 hours, As Needed   DULoxetine 60 mg oral delayed release capsule: 1 cap(s) orally once a day  levothyroxine 75 mcg (0.075 mg) oral tablet: 1 tab(s) orally once a day  predniSONE 10 mg oral tablet: 1 tab(s) orally once a day  Stiolto Respimat 10 ACT 2.5 mcg-2.5 mcg/inh inhalation aerosol: 2 puff(s) inhaled once a day  traZODone 100 mg oral tablet: 1 tab(s) orally once a day (at bedtime)   Albuterol (Eqv-ProAir HFA) 90 mcg/inh inhalation aerosol: 2 puff(s) inhaled every 6 hours, As Needed   DULoxetine 60 mg oral delayed release capsule: 1 cap(s) orally once a day  levothyroxine 125 mcg (0.125 mg) oral tablet: 1 tab(s) orally once a day  midodrine 5 mg oral tablet: 1 tab(s) orally 3 times a day  oxyCODONE 5 mg oral tablet: 1 tab(s) orally every 4 hours As needed Moderate Pain (4 - 6)  predniSONE 10 mg oral tablet: 1 tab(s) orally once a day  senna leaf extract oral tablet: 2 tab(s) orally once a day (at bedtime)  Stiolto Respimat 10 ACT 2.5 mcg-2.5 mcg/inh inhalation aerosol: 2 puff(s) inhaled once a day  sulfamethoxazole-trimethoprim 800 mg-160 mg oral tablet: 1 tab(s) orally 2 times a day for 21 days  traZODone 100 mg oral tablet: 1 tab(s) orally once a day (at bedtime)

## 2024-10-18 LAB
ALBUMIN SERPL ELPH-MCNC: 2.2 G/DL — LOW (ref 3.3–5)
ALP SERPL-CCNC: 54 U/L — SIGNIFICANT CHANGE UP (ref 40–120)
ALT FLD-CCNC: 27 U/L — SIGNIFICANT CHANGE UP (ref 12–78)
ANION GAP SERPL CALC-SCNC: 4 MMOL/L — LOW (ref 5–17)
ANISOCYTOSIS BLD QL: SLIGHT — SIGNIFICANT CHANGE UP
AST SERPL-CCNC: 24 U/L — SIGNIFICANT CHANGE UP (ref 15–37)
BASOPHILS # BLD AUTO: 0 K/UL — SIGNIFICANT CHANGE UP (ref 0–0.2)
BASOPHILS NFR BLD AUTO: 0 % — SIGNIFICANT CHANGE UP (ref 0–2)
BILIRUB SERPL-MCNC: 0.2 MG/DL — SIGNIFICANT CHANGE UP (ref 0.2–1.2)
BLD GP AB SCN SERPL QL: SIGNIFICANT CHANGE UP
BUN SERPL-MCNC: 11 MG/DL — SIGNIFICANT CHANGE UP (ref 7–23)
CALCIUM SERPL-MCNC: 8.6 MG/DL — SIGNIFICANT CHANGE UP (ref 8.5–10.1)
CHLORIDE SERPL-SCNC: 104 MMOL/L — SIGNIFICANT CHANGE UP (ref 96–108)
CO2 SERPL-SCNC: 30 MMOL/L — SIGNIFICANT CHANGE UP (ref 22–31)
CREAT SERPL-MCNC: 0.79 MG/DL — SIGNIFICANT CHANGE UP (ref 0.5–1.3)
CULTURE RESULTS: ABNORMAL
CULTURE RESULTS: SIGNIFICANT CHANGE UP
EGFR: 100 ML/MIN/1.73M2 — SIGNIFICANT CHANGE UP
EOSINOPHIL # BLD AUTO: 0.26 K/UL — SIGNIFICANT CHANGE UP (ref 0–0.5)
EOSINOPHIL NFR BLD AUTO: 1 % — SIGNIFICANT CHANGE UP (ref 0–6)
GLUCOSE SERPL-MCNC: 85 MG/DL — SIGNIFICANT CHANGE UP (ref 70–99)
HCT VFR BLD CALC: 29.2 % — LOW (ref 39–50)
HGB BLD-MCNC: 9.5 G/DL — LOW (ref 13–17)
LYMPHOCYTES # BLD AUTO: 23 K/UL — HIGH (ref 1–3.3)
LYMPHOCYTES # BLD AUTO: 87 % — HIGH (ref 13–44)
MACROCYTES BLD QL: SIGNIFICANT CHANGE UP
MANUAL SMEAR VERIFICATION: SIGNIFICANT CHANGE UP
MCHC RBC-ENTMCNC: 32.5 GM/DL — SIGNIFICANT CHANGE UP (ref 32–36)
MCHC RBC-ENTMCNC: 36.8 PG — HIGH (ref 27–34)
MCV RBC AUTO: 113.2 FL — HIGH (ref 80–100)
MONOCYTES # BLD AUTO: 0 K/UL — SIGNIFICANT CHANGE UP (ref 0–0.9)
MONOCYTES NFR BLD AUTO: 0 % — LOW (ref 2–14)
NEUTROPHILS # BLD AUTO: 3.17 K/UL — SIGNIFICANT CHANGE UP (ref 1.8–7.4)
NEUTROPHILS NFR BLD AUTO: 12 % — LOW (ref 43–77)
NRBC # BLD: 0 /100 WBCS — SIGNIFICANT CHANGE UP (ref 0–0)
NRBC # BLD: SIGNIFICANT CHANGE UP /100 WBCS (ref 0–0)
ORGANISM # SPEC MICROSCOPIC CNT: ABNORMAL
ORGANISM # SPEC MICROSCOPIC CNT: SIGNIFICANT CHANGE UP
PLAT MORPH BLD: NORMAL — SIGNIFICANT CHANGE UP
PLATELET # BLD AUTO: 301 K/UL — SIGNIFICANT CHANGE UP (ref 150–400)
POTASSIUM SERPL-MCNC: 4 MMOL/L — SIGNIFICANT CHANGE UP (ref 3.5–5.3)
POTASSIUM SERPL-SCNC: 4 MMOL/L — SIGNIFICANT CHANGE UP (ref 3.5–5.3)
PROT SERPL-MCNC: 5.5 GM/DL — LOW (ref 6–8.3)
RBC # BLD: 2.58 M/UL — LOW (ref 4.2–5.8)
RBC # FLD: 13.8 % — SIGNIFICANT CHANGE UP (ref 10.3–14.5)
RBC BLD AUTO: ABNORMAL
SMUDGE CELLS # BLD: PRESENT — SIGNIFICANT CHANGE UP
SODIUM SERPL-SCNC: 138 MMOL/L — SIGNIFICANT CHANGE UP (ref 135–145)
SPECIMEN SOURCE: SIGNIFICANT CHANGE UP
SPECIMEN SOURCE: SIGNIFICANT CHANGE UP
VANCOMYCIN TROUGH SERPL-MCNC: 15.2 UG/ML — SIGNIFICANT CHANGE UP (ref 10–20)
WBC # BLD: 26.44 K/UL — HIGH (ref 3.8–10.5)
WBC # FLD AUTO: 26.44 K/UL — HIGH (ref 3.8–10.5)

## 2024-10-18 PROCEDURE — 99232 SBSQ HOSP IP/OBS MODERATE 35: CPT

## 2024-10-18 RX ORDER — SODIUM CHLORIDE 9 MG/ML
1000 INJECTION, SOLUTION INTRAMUSCULAR; INTRAVENOUS; SUBCUTANEOUS
Refills: 0 | Status: DISCONTINUED | OUTPATIENT
Start: 2024-10-18 | End: 2024-10-20

## 2024-10-18 RX ORDER — AMPICILLIN AND SULBACTAM 2; 1 G/1; G/1
3 INJECTION, POWDER, FOR SOLUTION INTRAMUSCULAR; INTRAVENOUS EVERY 6 HOURS
Refills: 0 | Status: DISCONTINUED | OUTPATIENT
Start: 2024-10-18 | End: 2024-10-24

## 2024-10-18 RX ORDER — FENTANYL CITRAT/DEXTROSE 5%/PF 1250MCG/50
50 PATIENT CONTROLLED ANALGESIA SYRINGE INTRAVENOUS
Refills: 0 | Status: DISCONTINUED | OUTPATIENT
Start: 2024-10-18 | End: 2024-10-18

## 2024-10-18 RX ORDER — ONDANSETRON HYDROCHLORIDE 2 MG/ML
4 INJECTION, SOLUTION INTRAMUSCULAR; INTRAVENOUS ONCE
Refills: 0 | Status: DISCONTINUED | OUTPATIENT
Start: 2024-10-18 | End: 2024-10-18

## 2024-10-18 RX ADMIN — VANCOMYCIN HYDROCHLORIDE 250 MILLIGRAM(S): KIT at 23:44

## 2024-10-18 RX ADMIN — SODIUM CHLORIDE 100 MILLILITER(S): 9 INJECTION, SOLUTION INTRAMUSCULAR; INTRAVENOUS; SUBCUTANEOUS at 22:12

## 2024-10-18 RX ADMIN — Medication 1 MILLIGRAM(S): at 13:12

## 2024-10-18 RX ADMIN — TRAZODONE HYDROCHLORIDE 100 MILLIGRAM(S): 100 TABLET ORAL at 22:09

## 2024-10-18 RX ADMIN — SODIUM CHLORIDE 125 MILLILITER(S): 9 INJECTION, SOLUTION INTRAMUSCULAR; INTRAVENOUS; SUBCUTANEOUS at 13:11

## 2024-10-18 RX ADMIN — CHLORHEXIDINE GLUCONATE 1 APPLICATION(S): 40 SOLUTION TOPICAL at 08:00

## 2024-10-18 RX ADMIN — DULOXETINE HYDROCHLORIDE 60 MILLIGRAM(S): 30 CAPSULE, DELAYED RELEASE ORAL at 09:02

## 2024-10-18 RX ADMIN — Medication 75 MILLILITER(S): at 17:22

## 2024-10-18 RX ADMIN — POLYETHYLENE GLYCOL 3350 17 GRAM(S): 17 POWDER, FOR SOLUTION ORAL at 09:01

## 2024-10-18 RX ADMIN — Medication 1 MILLIGRAM(S): at 19:06

## 2024-10-18 RX ADMIN — AMPICILLIN AND SULBACTAM 200 GRAM(S): 2; 1 INJECTION, POWDER, FOR SOLUTION INTRAMUSCULAR; INTRAVENOUS at 12:12

## 2024-10-18 RX ADMIN — SODIUM CHLORIDE 125 MILLILITER(S): 9 INJECTION, SOLUTION INTRAMUSCULAR; INTRAVENOUS; SUBCUTANEOUS at 20:26

## 2024-10-18 RX ADMIN — AMPICILLIN AND SULBACTAM 200 GRAM(S): 2; 1 INJECTION, POWDER, FOR SOLUTION INTRAMUSCULAR; INTRAVENOUS at 17:16

## 2024-10-18 RX ADMIN — TIOTROPIUM BROMIDE AND OLODATEROL 2 PUFF(S): 3.124; 2.736 SPRAY, METERED RESPIRATORY (INHALATION) at 08:54

## 2024-10-18 RX ADMIN — OXYCODONE HYDROCHLORIDE 5 MILLIGRAM(S): 30 TABLET ORAL at 19:17

## 2024-10-18 RX ADMIN — VANCOMYCIN HYDROCHLORIDE 250 MILLIGRAM(S): KIT at 11:23

## 2024-10-18 RX ADMIN — Medication 10 MILLIGRAM(S): at 09:03

## 2024-10-18 RX ADMIN — AMPICILLIN AND SULBACTAM 200 GRAM(S): 2; 1 INJECTION, POWDER, FOR SOLUTION INTRAMUSCULAR; INTRAVENOUS at 23:45

## 2024-10-18 RX ADMIN — SODIUM CHLORIDE 125 MILLILITER(S): 9 INJECTION, SOLUTION INTRAMUSCULAR; INTRAVENOUS; SUBCUTANEOUS at 05:47

## 2024-10-18 RX ADMIN — Medication 1 MILLIGRAM(S): at 12:12

## 2024-10-18 RX ADMIN — VANCOMYCIN HYDROCHLORIDE 250 MILLIGRAM(S): KIT at 00:45

## 2024-10-18 RX ADMIN — Medication 2 TABLET(S): at 22:11

## 2024-10-18 NOTE — PROGRESS NOTE ADULT - SUBJECTIVE AND OBJECTIVE BOX
Hospital  Day#: 5  Procedure:  Incision & drainage of scrotal abscess by Dr. Herring      The patient is doing well with c/o r-inguinal/scrotal pain, having flatus & BM's, and denies nausea & vomiting.     Vital Signs Last 24 Hrs  T(C): 36.6 (18 Oct 2024 08:34), Max: 36.6 (17 Oct 2024 15:51)  T(F): 97.8 (18 Oct 2024 08:34), Max: 97.8 (17 Oct 2024 15:51)  HR: 64 (18 Oct 2024 09:09) (57 - 64)  BP: 92/62 (18 Oct 2024 08:34) (89/66 - 129/92)  BP(mean): --  RR: 17 (18 Oct 2024 08:34) (17 - 18)  SpO2: 83% (18 Oct 2024 08:34) (83% - 97%)    Parameters below as of 18 Oct 2024 08:34  Patient On (Oxygen Delivery Method): nasal cannula        PHYSICAL EXAM:  General: NAD.  HEENT: no JVD, no jaundice.  LUNGS: CTAB.  Heart: S1 S2 RRR  Abd: soft nt/nd                             9.5    26.44 )-----------( 301      ( 18 Oct 2024 08:16 )             29.2       10-18    138  |  104  |  11  ----------------------------<  85  4.0   |  30  |  0.79    Ca    8.6      18 Oct 2024 08:16    TPro  5.5[L]  /  Alb  2.2[L]  /  TBili  0.2  /  DBili  x   /  AST  24  /  ALT  27  /  AlkPhos  54  10-18

## 2024-10-18 NOTE — PROGRESS NOTE ADULT - SUBJECTIVE AND OBJECTIVE BOX
Patient is a 64y old  Male who presents with a chief complaint of scrotal pain (18 Oct 2024 10:11)      INTERVAL HPI/OVERNIGHT EVENTS: Pt seen and examined at bedside. Admits to bowel movement yesterday s/p Dulcolax suppository. Abdominal distention much improved. Plan for OR today with Dr. Caro for scrotal exploration/drainage of scrotal/right inguinal collection.    MEDICATIONS  (STANDING):  ampicillin/sulbactam  IVPB 3 Gram(s) IV Intermittent every 6 hours  chlorhexidine 4% Liquid 1 Application(s) Topical <User Schedule>  DULoxetine 60 milliGRAM(s) Oral daily  influenza   Vaccine 0.5 milliLiter(s) IntraMuscular once  levothyroxine 75 MICROGram(s) Oral daily  polyethylene glycol 3350 17 Gram(s) Oral daily  predniSONE   Tablet 10 milliGRAM(s) Oral daily  senna 2 Tablet(s) Oral at bedtime  sodium chloride 0.9%. 1000 milliLiter(s) (125 mL/Hr) IV Continuous <Continuous>  sodium chloride 0.9%. 750 milliLiter(s) (75 mL/Hr) IV Continuous <Continuous>  tiotropium 2.5 MICROgram(s)/olodaterol 2.5 MICROgram(s) (STIOLTO) Inhaler 2 Puff(s) Inhalation daily  traZODone 100 milliGRAM(s) Oral at bedtime  vancomycin  IVPB 750 milliGRAM(s) IV Intermittent every 12 hours    MEDICATIONS  (PRN):  acetaminophen     Tablet .. 650 milliGRAM(s) Oral every 6 hours PRN Temp greater or equal to 38C (100.4F), Mild Pain (1 - 3)  albuterol    90 MICROgram(s) HFA Inhaler 2 Puff(s) Inhalation every 6 hours PRN Bronchospasm  bisacodyl 5 milliGRAM(s) Oral every 12 hours PRN Constipation  HYDROmorphone  Injectable 1 milliGRAM(s) IV Push every 4 hours PRN Severe Pain (7 - 10)  ondansetron Injectable 4 milliGRAM(s) IV Push every 6 hours PRN Nausea and/or Vomiting  oxyCODONE    IR 5 milliGRAM(s) Oral every 4 hours PRN Moderate Pain (4 - 6)      Allergies    No Known Allergies    Intolerances        REVIEW OF SYSTEMS:  CONSTITUTIONAL: No fever or chills  HEENT:  No headache, no sore throat  RESPIRATORY: No cough, wheezing, or shortness of breath  CARDIOVASCULAR: No chest pain, palpitations  GASTROINTESTINAL: lower abd pain and bloating largely improved, BM yesterday, no nausea/vomiting  GENITOURINARY: +discomfort of scrotum; No dysuria, frequency, or hematuria  NEUROLOGICAL: no focal weakness or dizziness  MUSCULOSKELETAL: no myalgias     Vital Signs Last 24 Hrs  T(C): 36.6 (18 Oct 2024 08:34), Max: 36.6 (17 Oct 2024 15:51)  T(F): 97.8 (18 Oct 2024 08:34), Max: 97.8 (17 Oct 2024 15:51)  HR: 64 (18 Oct 2024 09:09) (57 - 64)  BP: 92/62 (18 Oct 2024 08:34) (89/66 - 129/92)  BP(mean): --  RR: 17 (18 Oct 2024 08:34) (17 - 18)  SpO2: 83% (18 Oct 2024 08:34) (83% - 97%)    Parameters below as of 18 Oct 2024 08:34  Patient On (Oxygen Delivery Method): nasal cannula        PHYSICAL EXAM:  GENERAL: NAD  HEENT: anicteric, moist mucous membranes  CHEST/LUNG:  CTA b/l, no rales, wheezes, or rhonchi  HEART:  RRR, S1, S2  ABDOMEN: soft, nontender, non distended  : scrotal abscess s/p I&D, dressings in place  EXTREMITIES: no edema, cyanosis, or calf tenderness  NERVOUS SYSTEM: answers questions and follows commands appropriately    LABS:                        9.5    26.44 )-----------( 301      ( 18 Oct 2024 08:16 )             29.2     CBC Full  -  ( 18 Oct 2024 08:16 )  WBC Count : 26.44 K/uL  Hemoglobin : 9.5 g/dL  Hematocrit : 29.2 %  Platelet Count - Automated : 301 K/uL  Mean Cell Volume : 113.2 fl  Mean Cell Hemoglobin : 36.8 pg  Mean Cell Hemoglobin Concentration : 32.5 gm/dL  Auto Neutrophil # : 3.17 K/uL  Auto Lymphocyte # : 23.00 K/uL  Auto Monocyte # : 0.00 K/uL  Auto Eosinophil # : 0.26 K/uL  Auto Basophil # : 0.00 K/uL  Auto Neutrophil % : 12.0 %  Auto Lymphocyte % : 87.0 %  Auto Monocyte % : 0.0 %  Auto Eosinophil % : 1.0 %  Auto Basophil % : 0.0 %    18 Oct 2024 08:16    138    |  104    |  11     ----------------------------<  85     4.0     |  30     |  0.79     Ca    8.6        18 Oct 2024 08:16    TPro  5.5    /  Alb  2.2    /  TBili  0.2    /  DBili  x      /  AST  24     /  ALT  27     /  AlkPhos  54     18 Oct 2024 08:16      Urinalysis Basic - ( 18 Oct 2024 08:16 )    Color: x / Appearance: x / SG: x / pH: x  Gluc: 85 mg/dL / Ketone: x  / Bili: x / Urobili: x   Blood: x / Protein: x / Nitrite: x   Leuk Esterase: x / RBC: x / WBC x   Sq Epi: x / Non Sq Epi: x / Bacteria: x      CAPILLARY BLOOD GLUCOSE            Culture - Abscess with Gram Stain (collected 10-13-24 @ 14:35)  Source: .Abscess  Gram Stain (10-14-24 @ 00:09):    No polymorphonuclear cells seen per low power field    Few Gram Positive Cocci in Clusters seen per oil power field  Final Report (10-18-24 @ 12:53):    Numerous Methicillin Resistant Staphylococcus aureus  Organism: Methicillin resistant Staphylococcus aureus (10-18-24 @ 12:53)  Organism: Methicillin resistant Staphylococcus aureus (10-18-24 @ 12:53)      Method Type: HARVINDER      -  Clindamycin: S <=0.25      -  Daptomycin: S 0.5      -  Erythromycin: R >4      -  Gentamicin: S <=1 Should not be used as monotherapy      -  Linezolid: S 2      -  Oxacillin: R >2      -  Penicillin: R >8      -  Rifampin: S <=1 Should not be used as monotherapy      -  Tetracycline: S <=1      -  Trimethoprim/Sulfamethoxazole: S <=0.5/9.5      -  Vancomycin: S 2    Urinalysis with Rflx Culture (collected 10-13-24 @ 05:05)    Culture - Blood (collected 10-13-24 @ 01:49)  Source: .Blood BLOOD  Final Report (10-18-24 @ 11:00):    No growth at 5 days        RADIOLOGY & ADDITIONAL TESTS:    Personally reviewed.     Consultant(s) Notes Reviewed:  [x] YES  [ ] NO

## 2024-10-18 NOTE — PROGRESS NOTE ADULT - ASSESSMENT
A/P: 64y Male with scrotal abscess - probably tracking up to right inguinal canal    Pain Meds PRN  Continue Vancomycin  Keep pt NPO  Since pt still with high WBC and collection appreciated in right inguinal area, Dr Caro planning on taking pt to the OR today for scrotal exploration/drainage of scrotal/right inguinal collection  Above discussed with Dr. Caro

## 2024-10-18 NOTE — PROGRESS NOTE ADULT - SUBJECTIVE AND OBJECTIVE BOX
Date of service: 10-18-24 @ 10:12    pt seen and examined  s/p I & D of scrotal abscess 10/13  packing in place; has R groin discomfort  plan for return to OR for further debridement by urology   afebrile    ROS: no fever or chills; denies dizziness, no HA, no SOB or cough, no abdominal pain, no diarrhea or constipation; no legs pain, no rashes    MEDICATIONS  (STANDING):  ampicillin/sulbactam  IVPB 3 Gram(s) IV Intermittent every 6 hours  chlorhexidine 4% Liquid 1 Application(s) Topical <User Schedule>  DULoxetine 60 milliGRAM(s) Oral daily  influenza   Vaccine 0.5 milliLiter(s) IntraMuscular once  levothyroxine 75 MICROGram(s) Oral daily  polyethylene glycol 3350 17 Gram(s) Oral daily  predniSONE   Tablet 10 milliGRAM(s) Oral daily  senna 2 Tablet(s) Oral at bedtime  sodium chloride 0.9%. 750 milliLiter(s) (75 mL/Hr) IV Continuous <Continuous>  sodium chloride 0.9%. 1000 milliLiter(s) (125 mL/Hr) IV Continuous <Continuous>  tiotropium 2.5 MICROgram(s)/olodaterol 2.5 MICROgram(s) (STIOLTO) Inhaler 2 Puff(s) Inhalation daily  traZODone 100 milliGRAM(s) Oral at bedtime  vancomycin  IVPB 750 milliGRAM(s) IV Intermittent every 12 hours    Vital Signs Last 24 Hrs  T(C): 36.6 (18 Oct 2024 08:34), Max: 36.6 (17 Oct 2024 15:51)  T(F): 97.8 (18 Oct 2024 08:34), Max: 97.8 (17 Oct 2024 15:51)  HR: 57 (18 Oct 2024 08:34) (57 - 63)  BP: 92/62 (18 Oct 2024 08:34) (89/66 - 129/92)  BP(mean): --  RR: 17 (18 Oct 2024 08:34) (17 - 18)  SpO2: 83% (18 Oct 2024 08:34) (83% - 97%)    Parameters below as of 18 Oct 2024 08:34  Patient On (Oxygen Delivery Method): nasal cannula      PE:  Constitutional: NAD  HEENT: NC/AT, EOMI, PERRLA, conjunctivae clear; ears and nose atraumatic; pharynx benign  Neck: supple; thyroid not palpable  Back: no tenderness  Respiratory: respiratory effort normal; clear to auscultation  Cardiovascular: S1S2 regular, no murmurs  Abdomen: soft, not tender, not distended, positive BS; liver and spleen WNL  Genitourinary: no suprapubic tenderness; scrotal edema tenderness warmth erythema   Lymphatic: no LN palpable  Musculoskeletal: no muscle tenderness, no joint swelling or tenderness  Extremities: no pedal edema  Neurological/ Psychiatric: AxOx3, Judgement and insight normal;  moving all extremities  Skin: no rashes; no palpable lesions    Labs: all available labs reviewed                                                         9.5    26.44 )-----------( 301      ( 18 Oct 2024 08:16 )             29.2     10-18    138  |  104  |  11  ----------------------------<  85  4.0   |  30  |  0.79    Ca    8.6      18 Oct 2024 08:16    TPro  5.5[L]  /  Alb  2.2[L]  /  TBili  0.2  /  DBili  x   /  AST  24  /  ALT  27  /  AlkPhos  54  10-18       Vancomycin Level, Trough: 15.2 ug/mL (10-17 @ 23:49)        Urinalysis Basic - ( 13 Oct 2024 07:54 )    Color: x / Appearance: x / SG: x / pH: x  Gluc: 105 mg/dL / Ketone: x  / Bili: x / Urobili: x   Blood: x / Protein: x / Nitrite: x   Leuk Esterase: x / RBC: x / WBC x   Sq Epi: x / Non Sq Epi: x / Bacteria: x    Culture - Abscess with Gram Stain (10.13.24 @ 14:35)   Gram Stain:   No polymorphonuclear cells seen per low power field   MRSA  Urinalysis with Rflx Culture (10.13.24 @ 05:05)   Urine Appearance: Clear  Color: Yellow  Specific Gravity: >1.030  pH Urine: 6.5  Protein, Urine: Negative mg/dL  Glucose Qualitative, Urine: Negative mg/dL  Ketone - Urine: Negative mg/dL  Blood, Urine: Negative  Bilirubin: Negative  Urobilinogen: 0.2 mg/dL  Leukocyte Esterase Concentration: Negative  Nitrite: Negative    Radiology: all available radiological tests reviewed    IMPRESSION:  Diffuse mild distention of small bowel loops, decreased in caliber   compared with recent pelvic CT 10/13/2024 and without transition point to   suggest bowel obstruction, likely representing a mild evolving ileus.    Small bilateral pleural effusions. Complete atelectasis of the visualized   left lower lobe.    Status post incision and drainage of the previously seen right scrotal   abscess.      IMPRESSION:  Partially visualized moderate sized complex collection in the right   scrotal wall, better depicted on the CT of the pelvis from earlier the   same day, compatible with an abscess.        IMPRESSION:  Large multilocular tubular rim-enhancing fluid collection in the right   hemiscrotum measuring up to 3 cm in diameter with pigtail-like extension   extending along the right spermatic cord. Collection extends to the skin   surface at the level of the base of the penis. Scrotal wall edema.   Constellation of findings and clinical history are most compatible with   an abscess.    Nonspecific mildly dilated fluid-filled small bowel loops in the right   lower abdomen.    Advanced directives addressed: full resuscitation

## 2024-10-18 NOTE — PROGRESS NOTE ADULT - ASSESSMENT
63 y/o M w/ PMH of COPD, CLL, lyme disease, hypothyoridism, cardiac arrest, p/w scrotal pain. Patient states that five days ago a dog jumped on him and cause a small puncture wound to his scrotum. Initially, it was only a small red leslie, but then it started to progress over time and increased in swelling and pain. Patient denies any fever or chills. Patient also, complains about intermittent abdominal discomfort, but is vague about the symptoms. Debra, nausea, vomiting, chest pain, shortness of breath, cough, runny nose, sore throat. As per ED physician, patient was seen by the surgery pa covering for urology team in the emergency room    #Scrotal abscess  - S/p I&D of scrotal abscess  - Blood cx negative  - Abscess fluid cx MRSA+   - Continue IV abx  - Scrotal elevation  - ID consulted, recs appreciated  - Plan for OR today with Dr. Caro for scrotal exploration/drainage of scrotal/right inguinal collection    #Lower abd pain  #Abd distention  #Constipation  - CT a/p with contrast reviewed  - Pt had BM yesterday s/p Dulcolax suppository  - Bowel regimen  - Uro following    #Hypotension  - Asymptomatic  - Continue IV NS maintenance @ 125cc/hr    #Leukocytosis  - Hx of CLL, unknown baseline WBC  - Monitor CBC    #Hypothyroid  - Continue Levothyroxine    #Anemia  - Likely anemia of chronic disease  - Hgb low, stable    #COPD  - Continue Spiriva  - Continue Prednisone    #Depression  - Continue Duloxetine    #Insomnia  - Continue Trazodone    #DVT ppx  - SCDs

## 2024-10-18 NOTE — PROGRESS NOTE ADULT - ASSESSMENT
63 y/o M w/ PMH of COPD, CLL, lyme disease, hypothyoridism, cardiac arrest, p/w scrotal pain. Patient states that five days ago a dog jumped on him and cause a small puncture wound to his scrotum. Initially, it was only a small red leslie, but then it started to progress over time and increased in swelling and pain.  Patient also, complains about intermittent abdominal discomfort, but is vague about the symptoms. Denies, nausea, vomiting, chest pain, shortness of breath, cough, runny nose, sore throat. As per ED physician, patient was seen by the surgery p a covering for urology team in the emergency room. Wbc ct 29 UA positive, imaging shows Large multilocular tubular rim-enhancing fluid collection in the right hemiscrotum measuring up to 3 cm in diameter with pigtail-like extension extending along the right spermatic cord. Collection extends to the skin surface at the level of the base of the penis. Scrotal wall edema. Started on IV abx, urology consulted.     1. Severe scrotal abscess/cellulitis. Pyuria. Complicated UTI. CLL. Leukocytosis. s/p Dog bite  - imaging reviewed repeat CT noted  - dc vancomycin/cefepime  - s/p IV zosyn [id number]  #5 - changed to unasyn [id number]   - on vancomycin  [id number] #6 f/u vancomycin trough  - continue with antibiotic coverage   - f/u urine cx blood cx no growth, abscess cx growing MRSA  - urology eval appreciated s/p I & D, plan for OR for scrotal exploration, further debridement today f/u cx  - tolerating abx well so far; no side effects noted  - reason for abx use and side effects reviewed with patient  - supportive care  - fu cbc    Clinical team may change from intravenous to oral antibiotics when the following criteria are met:   1. Patient is clinically improving/stable       a)	Improved signs and symptoms of infection from initial presentation       b)	Afebrile for 24 hours       c)	Leukocytosis trending towards normal range   2. Patient is tolerating oral intake   3. Initial/repeat blood cultures are negative     when above met change to po bactrim 1 DS BID x 14-21 day course

## 2024-10-18 NOTE — PROGRESS NOTE ADULT - SUBJECTIVE AND OBJECTIVE BOX
Pt resting in bed. c/o right inguinal/groin pain.  Taking pain medication with some relief. Voiding . No other complaints    Vital Signs Last 24 Hrs  T(C): 36.6 (18 Oct 2024 05:31), Max: 36.6 (17 Oct 2024 15:51)  T(F): 97.8 (18 Oct 2024 05:31), Max: 97.8 (17 Oct 2024 15:51)  HR: 63 (18 Oct 2024 05:31) (56 - 63)  BP: 89/66 (18 Oct 2024 05:31) (89/66 - 129/92)  BP(mean): --  RR: 18 (17 Oct 2024 20:54) (18 - 18)  SpO2: 97% (18 Oct 2024 05:31) (92% - 97%)    Parameters below as of 18 Oct 2024 05:31  Patient On (Oxygen Delivery Method): nasal cannula        I&O's Summary      Physical Exam  Gen: NAD, A&Ox3  Abd: Softly distended, tender lower abdomen  : Tender in right inguinal/groin area, Erythema noted and abscess/ collection noted in right inguinal area                          9.0    23.38 )-----------( 255      ( 17 Oct 2024 06:49 )             27.2       10-17    138  |  104  |  14  ----------------------------<  87  3.9   |  30  |  0.90    Ca    8.9      17 Oct 2024 06:49    TPro  5.8[L]  /  Alb  2.4[L]  /  TBili  0.2  /  DBili  x   /  AST  20  /  ALT  28  /  AlkPhos  68  10-17    ACC: [id number] EXAM:  CT ABDOMEN AND PELVIS OC IC   ORDERED BY: Sanaz Bermudez     PROCEDURE DATE:  10/17/2024          INTERPRETATION:  CLINICAL INFORMATION: Abdominal pain and distention.    COMPARISON: CT pelvis 10/13/2024, CT abdomen and pelvis 6/28/2022    CONTRAST/COMPLICATIONS:  IV Contrast: Omnipaque 350  90 cc administered   0 cc discarded  Oral Contrast: Omnipaque 300  Complications: None reported at time of study completion    PROCEDURE:  CT of the Abdomen and Pelvis was performed.  Sagittal and coronal reformats were performed.    FINDINGS:  LOWER CHEST: Bilateral pleural effusions. Complete atelectasis of the   visualized left lower lobe.    LIVER: Within normal limits.  BILE DUCTS: Normal caliber.  GALLBLADDER: Within normal limits.  SPLEEN: Within normal limits.  PANCREAS: Within normal limits.  ADRENALS: Within normal limits.  KIDNEYS/URETERS: Within normal limits.    BLADDER: Within normal limits.  REPRODUCTIVE ORGANS: Normal sized prostate gland. Status post incision   and drainage of the right scrotal abscess seen on recent CT. Small right   hydrocele.    BOWEL: Diffuse mild distention of small bowel loops, decrease in caliber   compared with recent pelvic CT 10/13/2024 and without transition point to   suggest bowel obstruction, likely representing a mild evolving ileus. Few   small bowel loops in the left hemipelvis are decompressed. The distal   terminal ileum are mildly distended with fluid. Prominent stool burden in   the colon. Stomach is distended with contrast.  PERITONEUM/RETROPERITONEUM: Within normal limits.  VESSELS: Within normal limits.  LYMPH NODES: No lymphadenopathy.  ABDOMINAL WALL: Postsurgical changes in the right scrotum.  BONES: Degenerative changes at L4-L5 with grade 1 anterolisthesisof L4   on L5.    IMPRESSION:  Diffuse mild distention of small bowel loops, decreased in caliber   compared with recent pelvic CT 10/13/2024 and without transition point to   suggest bowel obstruction, likely representing a mild evolving ileus.    Small bilateral pleural effusions. Complete atelectasis of the visualized   left lower lobe.    Status post incision and drainage of the previously seen right scrotal   abscess.

## 2024-10-18 NOTE — PROGRESS NOTE ADULT - PROBLEM SELECTOR PLAN 1
- Currently having bowel fxn no SBO  - Abdominal exam benign   - Scrotal abscess further treatment as per urology  signing off reconsult prn

## 2024-10-19 LAB
ALBUMIN SERPL ELPH-MCNC: 2.3 G/DL — LOW (ref 3.3–5)
ALP SERPL-CCNC: 44 U/L — SIGNIFICANT CHANGE UP (ref 40–120)
ALT FLD-CCNC: 23 U/L — SIGNIFICANT CHANGE UP (ref 12–78)
ANION GAP SERPL CALC-SCNC: 4 MMOL/L — LOW (ref 5–17)
AST SERPL-CCNC: 18 U/L — SIGNIFICANT CHANGE UP (ref 15–37)
BASOPHILS # BLD AUTO: 0.05 K/UL — SIGNIFICANT CHANGE UP (ref 0–0.2)
BASOPHILS NFR BLD AUTO: 0.2 % — SIGNIFICANT CHANGE UP (ref 0–2)
BILIRUB SERPL-MCNC: 0.2 MG/DL — SIGNIFICANT CHANGE UP (ref 0.2–1.2)
BUN SERPL-MCNC: 9 MG/DL — SIGNIFICANT CHANGE UP (ref 7–23)
CALCIUM SERPL-MCNC: 8.7 MG/DL — SIGNIFICANT CHANGE UP (ref 8.5–10.1)
CHLORIDE SERPL-SCNC: 105 MMOL/L — SIGNIFICANT CHANGE UP (ref 96–108)
CO2 SERPL-SCNC: 31 MMOL/L — SIGNIFICANT CHANGE UP (ref 22–31)
CREAT SERPL-MCNC: 0.85 MG/DL — SIGNIFICANT CHANGE UP (ref 0.5–1.3)
EGFR: 98 ML/MIN/1.73M2 — SIGNIFICANT CHANGE UP
EOSINOPHIL # BLD AUTO: 0.11 K/UL — SIGNIFICANT CHANGE UP (ref 0–0.5)
EOSINOPHIL NFR BLD AUTO: 0.4 % — SIGNIFICANT CHANGE UP (ref 0–6)
GLUCOSE SERPL-MCNC: 100 MG/DL — HIGH (ref 70–99)
GRAM STN FLD: SIGNIFICANT CHANGE UP
HCT VFR BLD CALC: 28.8 % — LOW (ref 39–50)
HGB BLD-MCNC: 9.2 G/DL — LOW (ref 13–17)
IMM GRANULOCYTES NFR BLD AUTO: 0.6 % — SIGNIFICANT CHANGE UP (ref 0–0.9)
LYMPHOCYTES # BLD AUTO: 20.63 K/UL — HIGH (ref 1–3.3)
LYMPHOCYTES # BLD AUTO: 81.8 % — HIGH (ref 13–44)
MCHC RBC-ENTMCNC: 31.9 GM/DL — LOW (ref 32–36)
MCHC RBC-ENTMCNC: 36.8 PG — HIGH (ref 27–34)
MCV RBC AUTO: 115.2 FL — HIGH (ref 80–100)
MONOCYTES # BLD AUTO: 0.16 K/UL — SIGNIFICANT CHANGE UP (ref 0–0.9)
MONOCYTES NFR BLD AUTO: 0.6 % — LOW (ref 2–14)
NEUTROPHILS # BLD AUTO: 4.12 K/UL — SIGNIFICANT CHANGE UP (ref 1.8–7.4)
NEUTROPHILS NFR BLD AUTO: 16.4 % — LOW (ref 43–77)
NIGHT BLUE STAIN TISS: SIGNIFICANT CHANGE UP
PLATELET # BLD AUTO: 302 K/UL — SIGNIFICANT CHANGE UP (ref 150–400)
POTASSIUM SERPL-MCNC: 3.8 MMOL/L — SIGNIFICANT CHANGE UP (ref 3.5–5.3)
POTASSIUM SERPL-SCNC: 3.8 MMOL/L — SIGNIFICANT CHANGE UP (ref 3.5–5.3)
PROT SERPL-MCNC: 5.3 GM/DL — LOW (ref 6–8.3)
RBC # BLD: 2.5 M/UL — LOW (ref 4.2–5.8)
RBC # FLD: 13.6 % — SIGNIFICANT CHANGE UP (ref 10.3–14.5)
SODIUM SERPL-SCNC: 140 MMOL/L — SIGNIFICANT CHANGE UP (ref 135–145)
SPECIMEN SOURCE: SIGNIFICANT CHANGE UP
SPECIMEN SOURCE: SIGNIFICANT CHANGE UP
WBC # BLD: 25.21 K/UL — HIGH (ref 3.8–10.5)
WBC # FLD AUTO: 25.21 K/UL — HIGH (ref 3.8–10.5)

## 2024-10-19 PROCEDURE — 99233 SBSQ HOSP IP/OBS HIGH 50: CPT

## 2024-10-19 RX ADMIN — Medication 1 MILLIGRAM(S): at 17:05

## 2024-10-19 RX ADMIN — POLYETHYLENE GLYCOL 3350 17 GRAM(S): 17 POWDER, FOR SOLUTION ORAL at 09:56

## 2024-10-19 RX ADMIN — Medication 650 MILLIGRAM(S): at 06:15

## 2024-10-19 RX ADMIN — CHLORHEXIDINE GLUCONATE 1 APPLICATION(S): 40 SOLUTION TOPICAL at 05:15

## 2024-10-19 RX ADMIN — AMPICILLIN AND SULBACTAM 200 GRAM(S): 2; 1 INJECTION, POWDER, FOR SOLUTION INTRAMUSCULAR; INTRAVENOUS at 12:36

## 2024-10-19 RX ADMIN — DULOXETINE HYDROCHLORIDE 60 MILLIGRAM(S): 30 CAPSULE, DELAYED RELEASE ORAL at 09:56

## 2024-10-19 RX ADMIN — Medication 650 MILLIGRAM(S): at 05:33

## 2024-10-19 RX ADMIN — AMPICILLIN AND SULBACTAM 200 GRAM(S): 2; 1 INJECTION, POWDER, FOR SOLUTION INTRAMUSCULAR; INTRAVENOUS at 05:28

## 2024-10-19 RX ADMIN — TIOTROPIUM BROMIDE AND OLODATEROL 2 PUFF(S): 3.124; 2.736 SPRAY, METERED RESPIRATORY (INHALATION) at 12:25

## 2024-10-19 RX ADMIN — TRAZODONE HYDROCHLORIDE 100 MILLIGRAM(S): 100 TABLET ORAL at 22:39

## 2024-10-19 RX ADMIN — VANCOMYCIN HYDROCHLORIDE 250 MILLIGRAM(S): KIT at 12:36

## 2024-10-19 RX ADMIN — Medication 10 MILLIGRAM(S): at 09:56

## 2024-10-19 RX ADMIN — SODIUM CHLORIDE 100 MILLILITER(S): 9 INJECTION, SOLUTION INTRAMUSCULAR; INTRAVENOUS; SUBCUTANEOUS at 17:05

## 2024-10-19 RX ADMIN — Medication 1 MILLIGRAM(S): at 22:05

## 2024-10-19 RX ADMIN — VANCOMYCIN HYDROCHLORIDE 250 MILLIGRAM(S): KIT at 23:21

## 2024-10-19 RX ADMIN — AMPICILLIN AND SULBACTAM 200 GRAM(S): 2; 1 INJECTION, POWDER, FOR SOLUTION INTRAMUSCULAR; INTRAVENOUS at 18:30

## 2024-10-19 RX ADMIN — Medication 2 TABLET(S): at 21:38

## 2024-10-19 RX ADMIN — Medication 1 MILLIGRAM(S): at 21:38

## 2024-10-19 RX ADMIN — Medication 75 MICROGRAM(S): at 05:28

## 2024-10-19 RX ADMIN — Medication 1 MILLIGRAM(S): at 11:48

## 2024-10-19 NOTE — PROGRESS NOTE ADULT - ASSESSMENT
63 y/o M w/ PMH of COPD, CLL, lyme disease, hypothyoridism, cardiac arrest, p/w scrotal pain. Patient states that five days ago a dog jumped on him and cause a small puncture wound to his scrotum. Initially, it was only a small red leslie, but then it started to progress over time and increased in swelling and pain. Patient denies any fever or chills. Patient also, complains about intermittent abdominal discomfort, but is vague about the symptoms. Debra, nausea, vomiting, chest pain, shortness of breath, cough, runny nose, sore throat. As per ED physician, patient was seen by the surgery pa covering for urology team in the emergency room    #Scrotal abscess  - S/p I&D of scrotal abscess on 10/13  - Blood cx negative  - Abscess fluid cx MRSA+   - Patient taken back to the OR on 10/18 for I&D  - F/U intra op cultures  - Continue IV abx (Unasyn/Vanco)  - ID consulted, recs appreciated  - Cont to monitor     #Lower abd pain, improved  #Abd distention, improved  CT a/p with contrast reviewed. Symptoms improved with passing of BM.  - Bowel regimen  - Gen Surg input appreciated  - Uro following    #Hypotension  - Asymptomatic  - Continue IV NS maintenance    #Leukocytosis  - Hx of CLL, unknown baseline WBC  - Monitor CBC    #Hypothyroid  - Continue Levothyroxine    #Anemia  - Likely anemia of chronic disease  - Hgb low, stable    #COPD  - Continue Spiriva  - Continue Prednisone    #Depression  - Continue Duloxetine    #Insomnia  - Continue Trazodone    #DVT ppx  - SCDs    Dispo: Anticipate D/C in 2-4 days pending cultures and surgical clearance

## 2024-10-19 NOTE — ANESTHESIA FOLLOW-UP NOTE - NSEVALATIONFT_GEN_ALL_CORE
Abdominal bloating with mild pain endorsed    Vital Signs Last 24 Hrs  T(C): 36.4 (19 Oct 2024 21:32), Max: 36.6 (19 Oct 2024 17:47)  T(F): 97.5 (19 Oct 2024 21:32), Max: 97.8 (19 Oct 2024 17:47)  HR: 82 (19 Oct 2024 21:32) (53 - 82)  BP: 104/68 (19 Oct 2024 21:32) (81/55 - 104/75)  BP(mean): --  RR: 18 (19 Oct 2024 21:32) (17 - 18)  SpO2: 97% (19 Oct 2024 21:32) (96% - 99%)    Parameters below as of 19 Oct 2024 21:32  Patient On (Oxygen Delivery Method): nasal cannula  O2 Flow (L/min): 3

## 2024-10-19 NOTE — PROGRESS NOTE ADULT - ASSESSMENT
A/P: 64y Male with scrotal abscess, tracking up to right inguinal canal now POD#1 for takeback I+D. Had hydrocele, probably reactive, but tunica vaginalis opened, drained and fluid sent for culture, loosely closed with widely spaced interrupted suture to allow drainage. Tolerating clears, abd exam improving. Packing removed, wet to dry dressing placed.     Pain Meds PRN  ABx per ID  Advance diet to full liquids  Wet to dry dressings, will likely need VNS for wound care at dispo

## 2024-10-19 NOTE — PROGRESS NOTE ADULT - SUBJECTIVE AND OBJECTIVE BOX
Pt seen and examined at bedside. POD#1 from takeback I+D. No acute events post op or overnight. Tolerating clears. Flatus, no BM.    Vital Signs Last 24 Hrs  T(C): 36.3 (19 Oct 2024 05:29), Max: 36.5 (18 Oct 2024 15:09)  T(F): 97.4 (19 Oct 2024 05:29), Max: 97.7 (18 Oct 2024 15:09)  HR: 58 (19 Oct 2024 05:29) (56 - 76)  BP: 87/62 (19 Oct 2024 05:29) (84/60 - 128/97)  BP(mean): --  RR: 17 (18 Oct 2024 22:00) (10 - 17)  SpO2: 99% (19 Oct 2024 05:29) (96% - 100%)    Parameters below as of 19 Oct 2024 05:29  Patient On (Oxygen Delivery Method): nasal cannula    NAD, A+Ox3  RRR  no increased WOB  ABD softly distended, nontender  scrotal wound CDI, no purulence, no drainage. tunica vaginalis exposed but pink and clean                          9.2    25.21 )-----------( 302      ( 19 Oct 2024 06:23 )             28.8   10-19    140  |  105  |  9   ----------------------------<  100[H]  3.8   |  31  |  0.85    Ca    8.7      19 Oct 2024 06:23    TPro  5.3[L]  /  Alb  2.3[L]  /  TBili  0.2  /  DBili  x   /  AST  18  /  ALT  23  /  AlkPhos  44  10-19

## 2024-10-19 NOTE — PROGRESS NOTE ADULT - SUBJECTIVE AND OBJECTIVE BOX
Patient is a 64y old  Male who presents with a chief complaint of scrotal pain (18 Oct 2024 10:11)      INTERVAL HPI/OVERNIGHT EVENTS: Pt seen and examined at bedside. States that his pain is well controlled today following surgery yesterday. Passing flatus and tolerating diet. Last BM was two days ago.    MEDICATIONS  (STANDING):  ampicillin/sulbactam  IVPB 3 Gram(s) IV Intermittent every 6 hours  chlorhexidine 4% Liquid 1 Application(s) Topical <User Schedule>  DULoxetine 60 milliGRAM(s) Oral daily  influenza   Vaccine 0.5 milliLiter(s) IntraMuscular once  levothyroxine 75 MICROGram(s) Oral daily  polyethylene glycol 3350 17 Gram(s) Oral daily  predniSONE   Tablet 10 milliGRAM(s) Oral daily  senna 2 Tablet(s) Oral at bedtime  sodium chloride 0.9%. 1000 milliLiter(s) (125 mL/Hr) IV Continuous <Continuous>  sodium chloride 0.9%. 750 milliLiter(s) (75 mL/Hr) IV Continuous <Continuous>  tiotropium 2.5 MICROgram(s)/olodaterol 2.5 MICROgram(s) (STIOLTO) Inhaler 2 Puff(s) Inhalation daily  traZODone 100 milliGRAM(s) Oral at bedtime  vancomycin  IVPB 750 milliGRAM(s) IV Intermittent every 12 hours    MEDICATIONS  (PRN):  acetaminophen     Tablet .. 650 milliGRAM(s) Oral every 6 hours PRN Temp greater or equal to 38C (100.4F), Mild Pain (1 - 3)  albuterol    90 MICROgram(s) HFA Inhaler 2 Puff(s) Inhalation every 6 hours PRN Bronchospasm  bisacodyl 5 milliGRAM(s) Oral every 12 hours PRN Constipation  HYDROmorphone  Injectable 1 milliGRAM(s) IV Push every 4 hours PRN Severe Pain (7 - 10)  ondansetron Injectable 4 milliGRAM(s) IV Push every 6 hours PRN Nausea and/or Vomiting  oxyCODONE    IR 5 milliGRAM(s) Oral every 4 hours PRN Moderate Pain (4 - 6)      Allergies    No Known Allergies    Intolerances      Vital Signs Last 24 Hrs  T(C): 36.6 (18 Oct 2024 08:34), Max: 36.6 (17 Oct 2024 15:51)  T(F): 97.8 (18 Oct 2024 08:34), Max: 97.8 (17 Oct 2024 15:51)  HR: 64 (18 Oct 2024 09:09) (57 - 64)  BP: 92/62 (18 Oct 2024 08:34) (89/66 - 129/92)  BP(mean): --  RR: 17 (18 Oct 2024 08:34) (17 - 18)  SpO2: 83% (18 Oct 2024 08:34) (83% - 97%)    Parameters below as of 18 Oct 2024 08:34  Patient On (Oxygen Delivery Method): nasal cannula        PHYSICAL EXAM:  GENERAL: NAD  HEENT: anicteric, moist mucous membranes  CHEST/LUNG:  CTA b/l, no rales, wheezes, or rhonchi  HEART:  RRR, S1, S2  ABDOMEN: soft, nontender, non distended  : Scrotal wound covered in surgical dressing.  EXTREMITIES: no edema, cyanosis, or calf tenderness  NERVOUS SYSTEM: answers questions and follows commands appropriately    LABS:                        9.5    26.44 )-----------( 301      ( 18 Oct 2024 08:16 )             29.2     CBC Full  -  ( 18 Oct 2024 08:16 )  WBC Count : 26.44 K/uL  Hemoglobin : 9.5 g/dL  Hematocrit : 29.2 %  Platelet Count - Automated : 301 K/uL  Mean Cell Volume : 113.2 fl  Mean Cell Hemoglobin : 36.8 pg  Mean Cell Hemoglobin Concentration : 32.5 gm/dL  Auto Neutrophil # : 3.17 K/uL  Auto Lymphocyte # : 23.00 K/uL  Auto Monocyte # : 0.00 K/uL  Auto Eosinophil # : 0.26 K/uL  Auto Basophil # : 0.00 K/uL  Auto Neutrophil % : 12.0 %  Auto Lymphocyte % : 87.0 %  Auto Monocyte % : 0.0 %  Auto Eosinophil % : 1.0 %  Auto Basophil % : 0.0 %    18 Oct 2024 08:16    138    |  104    |  11     ----------------------------<  85     4.0     |  30     |  0.79     Ca    8.6        18 Oct 2024 08:16    TPro  5.5    /  Alb  2.2    /  TBili  0.2    /  DBili  x      /  AST  24     /  ALT  27     /  AlkPhos  54     18 Oct 2024 08:16      Urinalysis Basic - ( 18 Oct 2024 08:16 )    Color: x / Appearance: x / SG: x / pH: x  Gluc: 85 mg/dL / Ketone: x  / Bili: x / Urobili: x   Blood: x / Protein: x / Nitrite: x   Leuk Esterase: x / RBC: x / WBC x   Sq Epi: x / Non Sq Epi: x / Bacteria: x      CAPILLARY BLOOD GLUCOSE            Culture - Abscess with Gram Stain (collected 10-13-24 @ 14:35)  Source: .Abscess  Gram Stain (10-14-24 @ 00:09):    No polymorphonuclear cells seen per low power field    Few Gram Positive Cocci in Clusters seen per oil power field  Final Report (10-18-24 @ 12:53):    Numerous Methicillin Resistant Staphylococcus aureus  Organism: Methicillin resistant Staphylococcus aureus (10-18-24 @ 12:53)  Organism: Methicillin resistant Staphylococcus aureus (10-18-24 @ 12:53)      Method Type: HARVINDER      -  Clindamycin: S <=0.25      -  Daptomycin: S 0.5      -  Erythromycin: R >4      -  Gentamicin: S <=1 Should not be used as monotherapy      -  Linezolid: S 2      -  Oxacillin: R >2      -  Penicillin: R >8      -  Rifampin: S <=1 Should not be used as monotherapy      -  Tetracycline: S <=1      -  Trimethoprim/Sulfamethoxazole: S <=0.5/9.5      -  Vancomycin: S 2    Urinalysis with Rflx Culture (collected 10-13-24 @ 05:05)    Culture - Blood (collected 10-13-24 @ 01:49)  Source: .Blood BLOOD  Final Report (10-18-24 @ 11:00):    No growth at 5 days        RADIOLOGY & ADDITIONAL TESTS:    Personally reviewed.     Consultant(s) Notes Reviewed:  [x] YES  [ ] NO

## 2024-10-20 LAB
HCT VFR BLD CALC: 27.9 % — LOW (ref 39–50)
HGB BLD-MCNC: 8.9 G/DL — LOW (ref 13–17)
MCHC RBC-ENTMCNC: 31.9 GM/DL — LOW (ref 32–36)
MCHC RBC-ENTMCNC: 36.5 PG — HIGH (ref 27–34)
MCV RBC AUTO: 114.3 FL — HIGH (ref 80–100)
PLATELET # BLD AUTO: 319 K/UL — SIGNIFICANT CHANGE UP (ref 150–400)
RBC # BLD: 2.44 M/UL — LOW (ref 4.2–5.8)
RBC # FLD: 13.7 % — SIGNIFICANT CHANGE UP (ref 10.3–14.5)
WBC # BLD: 27.52 K/UL — HIGH (ref 3.8–10.5)
WBC # FLD AUTO: 27.52 K/UL — HIGH (ref 3.8–10.5)

## 2024-10-20 PROCEDURE — 99231 SBSQ HOSP IP/OBS SF/LOW 25: CPT

## 2024-10-20 PROCEDURE — 99233 SBSQ HOSP IP/OBS HIGH 50: CPT

## 2024-10-20 RX ORDER — MIDODRINE HYDROCHLORIDE 2.5 MG/1
5 TABLET ORAL THREE TIMES A DAY
Refills: 0 | Status: DISCONTINUED | OUTPATIENT
Start: 2024-10-20 | End: 2024-10-24

## 2024-10-20 RX ADMIN — MIDODRINE HYDROCHLORIDE 5 MILLIGRAM(S): 2.5 TABLET ORAL at 16:35

## 2024-10-20 RX ADMIN — Medication 10 MILLIGRAM(S): at 10:26

## 2024-10-20 RX ADMIN — DULOXETINE HYDROCHLORIDE 60 MILLIGRAM(S): 30 CAPSULE, DELAYED RELEASE ORAL at 10:26

## 2024-10-20 RX ADMIN — Medication 1 MILLIGRAM(S): at 11:00

## 2024-10-20 RX ADMIN — Medication 1 MILLIGRAM(S): at 16:33

## 2024-10-20 RX ADMIN — Medication 1 MILLIGRAM(S): at 10:23

## 2024-10-20 RX ADMIN — CHLORHEXIDINE GLUCONATE 1 APPLICATION(S): 40 SOLUTION TOPICAL at 10:27

## 2024-10-20 RX ADMIN — AMPICILLIN AND SULBACTAM 200 GRAM(S): 2; 1 INJECTION, POWDER, FOR SOLUTION INTRAMUSCULAR; INTRAVENOUS at 17:54

## 2024-10-20 RX ADMIN — POLYETHYLENE GLYCOL 3350 17 GRAM(S): 17 POWDER, FOR SOLUTION ORAL at 10:26

## 2024-10-20 RX ADMIN — Medication 1 MILLIGRAM(S): at 22:04

## 2024-10-20 RX ADMIN — SODIUM CHLORIDE 100 MILLILITER(S): 9 INJECTION, SOLUTION INTRAMUSCULAR; INTRAVENOUS; SUBCUTANEOUS at 11:28

## 2024-10-20 RX ADMIN — Medication 1 MILLIGRAM(S): at 17:00

## 2024-10-20 RX ADMIN — Medication 2 TABLET(S): at 21:34

## 2024-10-20 RX ADMIN — VANCOMYCIN HYDROCHLORIDE 250 MILLIGRAM(S): KIT at 12:38

## 2024-10-20 RX ADMIN — Medication 1 MILLIGRAM(S): at 21:34

## 2024-10-20 RX ADMIN — SODIUM CHLORIDE 100 MILLILITER(S): 9 INJECTION, SOLUTION INTRAMUSCULAR; INTRAVENOUS; SUBCUTANEOUS at 02:10

## 2024-10-20 RX ADMIN — AMPICILLIN AND SULBACTAM 200 GRAM(S): 2; 1 INJECTION, POWDER, FOR SOLUTION INTRAMUSCULAR; INTRAVENOUS at 11:27

## 2024-10-20 RX ADMIN — Medication 75 MICROGRAM(S): at 06:46

## 2024-10-20 RX ADMIN — AMPICILLIN AND SULBACTAM 200 GRAM(S): 2; 1 INJECTION, POWDER, FOR SOLUTION INTRAMUSCULAR; INTRAVENOUS at 01:27

## 2024-10-20 RX ADMIN — TIOTROPIUM BROMIDE AND OLODATEROL 2 PUFF(S): 3.124; 2.736 SPRAY, METERED RESPIRATORY (INHALATION) at 09:31

## 2024-10-20 RX ADMIN — AMPICILLIN AND SULBACTAM 200 GRAM(S): 2; 1 INJECTION, POWDER, FOR SOLUTION INTRAMUSCULAR; INTRAVENOUS at 06:46

## 2024-10-20 RX ADMIN — Medication 650 MILLIGRAM(S): at 20:19

## 2024-10-20 RX ADMIN — TRAZODONE HYDROCHLORIDE 100 MILLIGRAM(S): 100 TABLET ORAL at 21:34

## 2024-10-20 NOTE — PROGRESS NOTE ADULT - SUBJECTIVE AND OBJECTIVE BOX
64y Male s/p I&D of scrotal abscess 10/15 and then brought back to OR on 10/18 for further drainage. No issues overnight . Dressing changed at bedside with Dr Caro .      Denies CP, palpitation, SOB, N/V/abdominal pain    acetaminophen     Tablet .. 650 milliGRAM(s) Oral every 6 hours PRN  albuterol    90 MICROgram(s) HFA Inhaler 2 Puff(s) Inhalation every 6 hours PRN  ampicillin/sulbactam  IVPB 3 Gram(s) IV Intermittent every 6 hours  bisacodyl 5 milliGRAM(s) Oral every 12 hours PRN  chlorhexidine 4% Liquid 1 Application(s) Topical <User Schedule>  DULoxetine 60 milliGRAM(s) Oral daily  HYDROmorphone  Injectable 1 milliGRAM(s) IV Push every 4 hours PRN  influenza   Vaccine 0.5 milliLiter(s) IntraMuscular once  levothyroxine 75 MICROGram(s) Oral daily  ondansetron Injectable 4 milliGRAM(s) IV Push every 6 hours PRN  oxyCODONE    IR 5 milliGRAM(s) Oral every 4 hours PRN  polyethylene glycol 3350 17 Gram(s) Oral daily  predniSONE   Tablet 10 milliGRAM(s) Oral daily  senna 2 Tablet(s) Oral at bedtime  sodium chloride 0.9%. 1000 milliLiter(s) IV Continuous <Continuous>  tiotropium 2.5 MICROgram(s)/olodaterol 2.5 MICROgram(s) (STIOLTO) Inhaler 2 Puff(s) Inhalation daily  traZODone 100 milliGRAM(s) Oral at bedtime  vancomycin  IVPB 750 milliGRAM(s) IV Intermittent every 12 hours      Vital Signs Last 24 Hrs  T(C): 36.4 (20 Oct 2024 09:32), Max: 36.6 (19 Oct 2024 17:47)  T(F): 97.6 (20 Oct 2024 09:32), Max: 97.8 (19 Oct 2024 17:47)  HR: 63 (20 Oct 2024 09:32) (60 - 82)  BP: 87/63 (20 Oct 2024 09:32) (87/63 - 104/68)  BP(mean): --  RR: 18 (20 Oct 2024 09:32) (18 - 18)  SpO2: 96% (20 Oct 2024 09:32) (95% - 97%)    Parameters below as of 20 Oct 2024 09:32  Patient On (Oxygen Delivery Method): nasal cannula        I&O's Summary    19 Oct 2024 07:01  -  20 Oct 2024 07:00  --------------------------------------------------------  IN: 0 mL / OUT: 1000 mL / NET: -1000 mL      I&O's Detail    19 Oct 2024 07:01  -  20 Oct 2024 07:00  --------------------------------------------------------  IN:  Total IN: 0 mL    OUT:    Voided (mL): 1000 mL  Total OUT: 1000 mL    Total NET: -1000 mL          Physical Exam  Gen: NAD, comfortable in bed  Neuro: A&Ox3  Lungs: CTAB  CV: S1/S2, RRR  Abd: Soft, ND    : Open scrotal incision no erythema , no edema + tenderness no oozing  +  wet to dry dressing and ABD    Extremities: Venodynes in place. No LE edema bl                          9.2    25.21 )-----------( 302      ( 19 Oct 2024 06:23 )             28.8       10-19    140  |  105  |  9   ----------------------------<  100[H]  3.8   |  31  |  0.85    Ca    8.7      19 Oct 2024 06:23    TPro  5.3[L]  /  Alb  2.3[L]  /  TBili  0.2  /  DBili  x   /  AST  18  /  ALT  23  /  AlkPhos  44  10-19

## 2024-10-20 NOTE — PROGRESS NOTE ADULT - ASSESSMENT
64y Male PMH of COPD, CLL, lyme disease, hypothyoridism, cardiac arrest, p/w scrotal and abscess secondary to injury by a dog now  s/p I&D of scrotal abscess 10/15 and then brought back to OR on 10/18 for further drainage.     Plan   dressing changed today with Dr Caro wet to dry   cont pain medication   IS  regular diet   Cont care as per primary team   DW Dr Caro

## 2024-10-20 NOTE — PROGRESS NOTE ADULT - ASSESSMENT
63 y/o M w/ PMH of COPD, CLL, lyme disease, hypothyoridism, cardiac arrest, p/w scrotal pain. Patient states that five days ago a dog jumped on him and cause a small puncture wound to his scrotum. Initially, it was only a small red leslie, but then it started to progress over time and increased in swelling and pain. Patient denies any fever or chills. Patient also, complains about intermittent abdominal discomfort, but is vague about the symptoms. Debra, nausea, vomiting, chest pain, shortness of breath, cough, runny nose, sore throat. As per ED physician, patient was seen by the surgery pa covering for urology team in the emergency room    #Scrotal abscess  - S/p I&D of scrotal abscess on 10/13  - Blood cx negative  - Abscess fluid cx MRSA+   - Patient taken back to the OR on 10/18 for I&D  - F/U intra op cultures from 10/18 with NGTD  - Continue IV abx (Unasyn/Vanco)  - ID consulted, recs appreciated  - Cont to monitor     #Lower abd pain, improved  #Abd distention, improved  CT a/p with contrast on 10/17 with no e/o obstruction. Symptoms improved with passing of BM.  - Bowel regimen  - Gen Surg input appreciated  - Uro following    #Hypotension  - Asymptomatic  - S/p IVF Hydration  - Midodrine 5 mg TID started    #Leukocytosis in patient with Hx of CLL  - Hx of CLL, unknown baseline WBC--has not followed up in years  - Monitor CBC    #Hypothyroid  - Continue Levothyroxine    #Anemia  - Likely anemia of chronic disease  - Hgb low, stable    #COPD  - Continue Spiriva  - Continue Prednisone    #Depression  - Continue Duloxetine    #Insomnia  - Continue Trazodone    #DVT ppx  - SCDs    Dispo: Anticipate D/C in 2-4 days pending cultures and surgical clearance

## 2024-10-20 NOTE — PROGRESS NOTE ADULT - SUBJECTIVE AND OBJECTIVE BOX
Patient is a 64y old  Male who presents with a chief complaint of scrotal pain (18 Oct 2024 10:11)      INTERVAL HPI/OVERNIGHT EVENTS: Pt seen and examined at bedside. States that he is feeling better, is hoping to have his diet advanced after being seen by surgery today.    MEDICATIONS  (STANDING):  ampicillin/sulbactam  IVPB 3 Gram(s) IV Intermittent every 6 hours  chlorhexidine 4% Liquid 1 Application(s) Topical <User Schedule>  DULoxetine 60 milliGRAM(s) Oral daily  influenza   Vaccine 0.5 milliLiter(s) IntraMuscular once  levothyroxine 75 MICROGram(s) Oral daily  polyethylene glycol 3350 17 Gram(s) Oral daily  predniSONE   Tablet 10 milliGRAM(s) Oral daily  senna 2 Tablet(s) Oral at bedtime  sodium chloride 0.9%. 1000 milliLiter(s) (125 mL/Hr) IV Continuous <Continuous>  sodium chloride 0.9%. 750 milliLiter(s) (75 mL/Hr) IV Continuous <Continuous>  tiotropium 2.5 MICROgram(s)/olodaterol 2.5 MICROgram(s) (STIOLTO) Inhaler 2 Puff(s) Inhalation daily  traZODone 100 milliGRAM(s) Oral at bedtime  vancomycin  IVPB 750 milliGRAM(s) IV Intermittent every 12 hours    MEDICATIONS  (PRN):  acetaminophen     Tablet .. 650 milliGRAM(s) Oral every 6 hours PRN Temp greater or equal to 38C (100.4F), Mild Pain (1 - 3)  albuterol    90 MICROgram(s) HFA Inhaler 2 Puff(s) Inhalation every 6 hours PRN Bronchospasm  bisacodyl 5 milliGRAM(s) Oral every 12 hours PRN Constipation  HYDROmorphone  Injectable 1 milliGRAM(s) IV Push every 4 hours PRN Severe Pain (7 - 10)  ondansetron Injectable 4 milliGRAM(s) IV Push every 6 hours PRN Nausea and/or Vomiting  oxyCODONE    IR 5 milliGRAM(s) Oral every 4 hours PRN Moderate Pain (4 - 6)      Allergies    No Known Allergies    Intolerances      Vital Signs Last 24 Hrs  T(C): 36.6 (18 Oct 2024 08:34), Max: 36.6 (17 Oct 2024 15:51)  T(F): 97.8 (18 Oct 2024 08:34), Max: 97.8 (17 Oct 2024 15:51)  HR: 64 (18 Oct 2024 09:09) (57 - 64)  BP: 92/62 (18 Oct 2024 08:34) (89/66 - 129/92)  BP(mean): --  RR: 17 (18 Oct 2024 08:34) (17 - 18)  SpO2: 83% (18 Oct 2024 08:34) (83% - 97%)    Parameters below as of 18 Oct 2024 08:34  Patient On (Oxygen Delivery Method): nasal cannula        PHYSICAL EXAM:  GENERAL: NAD  HEENT: anicteric, moist mucous membranes  CHEST/LUNG:  CTA b/l, no rales, wheezes, or rhonchi  HEART:  RRR, S1, S2  ABDOMEN: soft, nontender, non distended  : Scrotal wound covered in surgical dressing.  EXTREMITIES: no edema, cyanosis, or calf tenderness  NERVOUS SYSTEM: answers questions and follows commands appropriately    LABS:                        9.5    26.44 )-----------( 301      ( 18 Oct 2024 08:16 )             29.2     CBC Full  -  ( 18 Oct 2024 08:16 )  WBC Count : 26.44 K/uL  Hemoglobin : 9.5 g/dL  Hematocrit : 29.2 %  Platelet Count - Automated : 301 K/uL  Mean Cell Volume : 113.2 fl  Mean Cell Hemoglobin : 36.8 pg  Mean Cell Hemoglobin Concentration : 32.5 gm/dL  Auto Neutrophil # : 3.17 K/uL  Auto Lymphocyte # : 23.00 K/uL  Auto Monocyte # : 0.00 K/uL  Auto Eosinophil # : 0.26 K/uL  Auto Basophil # : 0.00 K/uL  Auto Neutrophil % : 12.0 %  Auto Lymphocyte % : 87.0 %  Auto Monocyte % : 0.0 %  Auto Eosinophil % : 1.0 %  Auto Basophil % : 0.0 %    18 Oct 2024 08:16    138    |  104    |  11     ----------------------------<  85     4.0     |  30     |  0.79     Ca    8.6        18 Oct 2024 08:16    TPro  5.5    /  Alb  2.2    /  TBili  0.2    /  DBili  x      /  AST  24     /  ALT  27     /  AlkPhos  54     18 Oct 2024 08:16      Urinalysis Basic - ( 18 Oct 2024 08:16 )    Color: x / Appearance: x / SG: x / pH: x  Gluc: 85 mg/dL / Ketone: x  / Bili: x / Urobili: x   Blood: x / Protein: x / Nitrite: x   Leuk Esterase: x / RBC: x / WBC x   Sq Epi: x / Non Sq Epi: x / Bacteria: x      CAPILLARY BLOOD GLUCOSE            Culture - Abscess with Gram Stain (collected 10-13-24 @ 14:35)  Source: .Abscess  Gram Stain (10-14-24 @ 00:09):    No polymorphonuclear cells seen per low power field    Few Gram Positive Cocci in Clusters seen per oil power field  Final Report (10-18-24 @ 12:53):    Numerous Methicillin Resistant Staphylococcus aureus  Organism: Methicillin resistant Staphylococcus aureus (10-18-24 @ 12:53)  Organism: Methicillin resistant Staphylococcus aureus (10-18-24 @ 12:53)      Method Type: HARVINDER      -  Clindamycin: S <=0.25      -  Daptomycin: S 0.5      -  Erythromycin: R >4      -  Gentamicin: S <=1 Should not be used as monotherapy      -  Linezolid: S 2      -  Oxacillin: R >2      -  Penicillin: R >8      -  Rifampin: S <=1 Should not be used as monotherapy      -  Tetracycline: S <=1      -  Trimethoprim/Sulfamethoxazole: S <=0.5/9.5      -  Vancomycin: S 2    Urinalysis with Rflx Culture (collected 10-13-24 @ 05:05)    Culture - Blood (collected 10-13-24 @ 01:49)  Source: .Blood BLOOD  Final Report (10-18-24 @ 11:00):    No growth at 5 days        RADIOLOGY & ADDITIONAL TESTS:    Personally reviewed.     Consultant(s) Notes Reviewed:  [x] YES  [ ] NO

## 2024-10-21 LAB
ANION GAP SERPL CALC-SCNC: 3 MMOL/L — LOW (ref 5–17)
BUN SERPL-MCNC: 13 MG/DL — SIGNIFICANT CHANGE UP (ref 7–23)
CALCIUM SERPL-MCNC: 8.7 MG/DL — SIGNIFICANT CHANGE UP (ref 8.5–10.1)
CHLORIDE SERPL-SCNC: 106 MMOL/L — SIGNIFICANT CHANGE UP (ref 96–108)
CO2 SERPL-SCNC: 31 MMOL/L — SIGNIFICANT CHANGE UP (ref 22–31)
CREAT SERPL-MCNC: 0.89 MG/DL — SIGNIFICANT CHANGE UP (ref 0.5–1.3)
EGFR: 96 ML/MIN/1.73M2 — SIGNIFICANT CHANGE UP
GLUCOSE SERPL-MCNC: 89 MG/DL — SIGNIFICANT CHANGE UP (ref 70–99)
HCT VFR BLD CALC: 29.3 % — LOW (ref 39–50)
HGB BLD-MCNC: 9.5 G/DL — LOW (ref 13–17)
MCHC RBC-ENTMCNC: 32.4 GM/DL — SIGNIFICANT CHANGE UP (ref 32–36)
MCHC RBC-ENTMCNC: 37.4 PG — HIGH (ref 27–34)
MCV RBC AUTO: 115.4 FL — HIGH (ref 80–100)
PLATELET # BLD AUTO: 349 K/UL — SIGNIFICANT CHANGE UP (ref 150–400)
POTASSIUM SERPL-MCNC: 4.3 MMOL/L — SIGNIFICANT CHANGE UP (ref 3.5–5.3)
POTASSIUM SERPL-SCNC: 4.3 MMOL/L — SIGNIFICANT CHANGE UP (ref 3.5–5.3)
RBC # BLD: 2.54 M/UL — LOW (ref 4.2–5.8)
RBC # FLD: 13.7 % — SIGNIFICANT CHANGE UP (ref 10.3–14.5)
SODIUM SERPL-SCNC: 140 MMOL/L — SIGNIFICANT CHANGE UP (ref 135–145)
WBC # BLD: 29.31 K/UL — HIGH (ref 3.8–10.5)
WBC # FLD AUTO: 29.31 K/UL — HIGH (ref 3.8–10.5)

## 2024-10-21 PROCEDURE — 99233 SBSQ HOSP IP/OBS HIGH 50: CPT

## 2024-10-21 RX ORDER — ACETAMINOPHEN 500 MG
1000 TABLET ORAL ONCE
Refills: 0 | Status: DISCONTINUED | OUTPATIENT
Start: 2024-10-21 | End: 2024-10-21

## 2024-10-21 RX ORDER — ONDANSETRON HYDROCHLORIDE 2 MG/ML
4 INJECTION, SOLUTION INTRAMUSCULAR; INTRAVENOUS ONCE
Refills: 0 | Status: DISCONTINUED | OUTPATIENT
Start: 2024-10-21 | End: 2024-10-21

## 2024-10-21 RX ORDER — OXYCODONE HYDROCHLORIDE 30 MG/1
5 TABLET ORAL ONCE
Refills: 0 | Status: DISCONTINUED | OUTPATIENT
Start: 2024-10-21 | End: 2024-10-21

## 2024-10-21 RX ORDER — OXYCODONE HYDROCHLORIDE 30 MG/1
5 TABLET ORAL EVERY 4 HOURS
Refills: 0 | Status: DISCONTINUED | OUTPATIENT
Start: 2024-10-21 | End: 2024-10-24

## 2024-10-21 RX ORDER — SODIUM CHLORIDE 9 MG/ML
1000 INJECTION, SOLUTION INTRAMUSCULAR; INTRAVENOUS; SUBCUTANEOUS
Refills: 0 | Status: DISCONTINUED | OUTPATIENT
Start: 2024-10-21 | End: 2024-10-24

## 2024-10-21 RX ORDER — FENTANYL CITRAT/DEXTROSE 5%/PF 1250MCG/50
50 PATIENT CONTROLLED ANALGESIA SYRINGE INTRAVENOUS
Refills: 0 | Status: DISCONTINUED | OUTPATIENT
Start: 2024-10-21 | End: 2024-10-21

## 2024-10-21 RX ADMIN — TIOTROPIUM BROMIDE AND OLODATEROL 2 PUFF(S): 3.124; 2.736 SPRAY, METERED RESPIRATORY (INHALATION) at 08:18

## 2024-10-21 RX ADMIN — VANCOMYCIN HYDROCHLORIDE 250 MILLIGRAM(S): KIT at 23:07

## 2024-10-21 RX ADMIN — OXYCODONE HYDROCHLORIDE 5 MILLIGRAM(S): 30 TABLET ORAL at 12:25

## 2024-10-21 RX ADMIN — Medication 75 MICROGRAM(S): at 05:33

## 2024-10-21 RX ADMIN — AMPICILLIN AND SULBACTAM 200 GRAM(S): 2; 1 INJECTION, POWDER, FOR SOLUTION INTRAMUSCULAR; INTRAVENOUS at 00:00

## 2024-10-21 RX ADMIN — MIDODRINE HYDROCHLORIDE 5 MILLIGRAM(S): 2.5 TABLET ORAL at 05:33

## 2024-10-21 RX ADMIN — MIDODRINE HYDROCHLORIDE 5 MILLIGRAM(S): 2.5 TABLET ORAL at 18:10

## 2024-10-21 RX ADMIN — MIDODRINE HYDROCHLORIDE 5 MILLIGRAM(S): 2.5 TABLET ORAL at 12:25

## 2024-10-21 RX ADMIN — POLYETHYLENE GLYCOL 3350 17 GRAM(S): 17 POWDER, FOR SOLUTION ORAL at 10:18

## 2024-10-21 RX ADMIN — OXYCODONE HYDROCHLORIDE 5 MILLIGRAM(S): 30 TABLET ORAL at 21:56

## 2024-10-21 RX ADMIN — SODIUM CHLORIDE 100 MILLILITER(S): 9 INJECTION, SOLUTION INTRAMUSCULAR; INTRAVENOUS; SUBCUTANEOUS at 20:32

## 2024-10-21 RX ADMIN — AMPICILLIN AND SULBACTAM 200 GRAM(S): 2; 1 INJECTION, POWDER, FOR SOLUTION INTRAMUSCULAR; INTRAVENOUS at 05:33

## 2024-10-21 RX ADMIN — Medication 2 TABLET(S): at 21:56

## 2024-10-21 RX ADMIN — TRAZODONE HYDROCHLORIDE 100 MILLIGRAM(S): 100 TABLET ORAL at 21:56

## 2024-10-21 RX ADMIN — AMPICILLIN AND SULBACTAM 200 GRAM(S): 2; 1 INJECTION, POWDER, FOR SOLUTION INTRAMUSCULAR; INTRAVENOUS at 20:32

## 2024-10-21 RX ADMIN — AMPICILLIN AND SULBACTAM 200 GRAM(S): 2; 1 INJECTION, POWDER, FOR SOLUTION INTRAMUSCULAR; INTRAVENOUS at 12:24

## 2024-10-21 RX ADMIN — CHLORHEXIDINE GLUCONATE 1 APPLICATION(S): 40 SOLUTION TOPICAL at 10:14

## 2024-10-21 RX ADMIN — VANCOMYCIN HYDROCHLORIDE 250 MILLIGRAM(S): KIT at 13:37

## 2024-10-21 RX ADMIN — VANCOMYCIN HYDROCHLORIDE 250 MILLIGRAM(S): KIT at 01:07

## 2024-10-21 RX ADMIN — DULOXETINE HYDROCHLORIDE 60 MILLIGRAM(S): 30 CAPSULE, DELAYED RELEASE ORAL at 10:18

## 2024-10-21 RX ADMIN — OXYCODONE HYDROCHLORIDE 5 MILLIGRAM(S): 30 TABLET ORAL at 22:20

## 2024-10-21 RX ADMIN — Medication 2 PUFF(S): at 08:19

## 2024-10-21 RX ADMIN — Medication 10 MILLIGRAM(S): at 10:18

## 2024-10-21 NOTE — PROGRESS NOTE ADULT - ASSESSMENT
65 y/o M w/ PMH of COPD, CLL, lyme disease, hypothyoridism, cardiac arrest, p/w scrotal pain. Patient states that five days ago a dog jumped on him and cause a small puncture wound to his scrotum. Initially, it was only a small red leslie, but then it started to progress over time and increased in swelling and pain. Patient denies any fever or chills. Patient also, complains about intermittent abdominal discomfort, but is vague about the symptoms. Debra, nausea, vomiting, chest pain, shortness of breath, cough, runny nose, sore throat. As per ED physician, patient was seen by the surgery pa covering for urology team in the emergency room    #Scrotal abscess.   S/p I&D of scrotal abscess on 10/13. Abscess fluid cx MRSA+. Patient taken back to the OR on 10/18 for I&D. F/U intra op cultures from 10/18 with NGTD. On 10/21 patient noted to have induration and edema tracking up the right inguinal area.  - Scheduled for take back drainage and packing vs I&D with Urology  - ID consulted; Continue IV abx (Unasyn/Vanco)  - Cont to monitor     #Lower abd pain, improved  #Abd distention, improved  CT a/p with contrast on 10/17 with no e/o obstruction. Symptoms improved with passing of BM.  - Bowel regimen  - Gen Surg input appreciated  - Uro following    #Hypotension  - Asymptomatic  - S/p IVF Hydration  - Midodrine 5 mg TID     #Leukocytosis in patient with Hx of CLL  - Hx of CLL, unknown baseline WBC--has not followed up in years  - Monitor CBC    #Hypothyroid  - Continue Levothyroxine    #Anemia  - Likely anemia of chronic disease  - Hgb low, stable    #COPD  - Continue Spiriva  - Continue Prednisone    #Depression  - Continue Duloxetine    #Insomnia  - Continue Trazodone    #DVT ppx  - SCDs    Dispo: Anticipate D/C in 2-4 days pending surgical intervention

## 2024-10-21 NOTE — PROGRESS NOTE ADULT - SUBJECTIVE AND OBJECTIVE BOX
Date of service: 10-21-24 @ 10:07    pt seen and examined  s/p I & D of scrotal abscess 10/13  packing in place; has R groin discomfort  s/p further debridement 10/18   has pain and edema along R groin  afebrile    ROS: no fever or chills; denies dizziness, no HA, no SOB or cough, no abdominal pain, no diarrhea or constipation; no legs pain, no rashes      MEDICATIONS  (STANDING):  ampicillin/sulbactam  IVPB 3 Gram(s) IV Intermittent every 6 hours  chlorhexidine 4% Liquid 1 Application(s) Topical <User Schedule>  DULoxetine 60 milliGRAM(s) Oral daily  influenza   Vaccine 0.5 milliLiter(s) IntraMuscular once  levothyroxine 75 MICROGram(s) Oral daily  midodrine. 5 milliGRAM(s) Oral three times a day  polyethylene glycol 3350 17 Gram(s) Oral daily  predniSONE   Tablet 10 milliGRAM(s) Oral daily  senna 2 Tablet(s) Oral at bedtime  tiotropium 2.5 MICROgram(s)/olodaterol 2.5 MICROgram(s) (STIOLTO) Inhaler 2 Puff(s) Inhalation daily  traZODone 100 milliGRAM(s) Oral at bedtime  vancomycin  IVPB 750 milliGRAM(s) IV Intermittent every 12 hours    Vital Signs Last 24 Hrs  T(C): 36.5 (21 Oct 2024 09:19), Max: 37.4 (20 Oct 2024 16:35)  T(F): 97.7 (21 Oct 2024 09:19), Max: 99.3 (20 Oct 2024 16:35)  HR: 66 (21 Oct 2024 08:25) (63 - 70)  BP: 95/67 (21 Oct 2024 09:19) (90/62 - 100/74)  BP(mean): 67 (21 Oct 2024 05:00) (67 - 67)  RR: 17 (21 Oct 2024 09:19) (17 - 18)  SpO2: 93% (21 Oct 2024 09:19) (93% - 97%)    Parameters below as of 21 Oct 2024 09:19  Patient On (Oxygen Delivery Method): nasal cannula  O2 Flow (L/min): 4      PE:  Constitutional: NAD  HEENT: NC/AT, EOMI, PERRLA, conjunctivae clear; ears and nose atraumatic; pharynx benign  Neck: supple; thyroid not palpable  Back: no tenderness  Respiratory: respiratory effort normal; clear to auscultation  Cardiovascular: S1S2 regular, no murmurs  Abdomen: soft, not tender, not distended, positive BS; liver and spleen WNL  Genitourinary: no suprapubic tenderness; scrotal edema tenderness warmth erythema   Lymphatic: no LN palpable  Musculoskeletal: no muscle tenderness, no joint swelling or tenderness  Extremities: no pedal edema  Neurological/ Psychiatric: AxOx3, Judgement and insight normal;  moving all extremities  Skin: no rashes; no palpable lesions    Labs: all available labs reviewed                                   9.5    29.31 )-----------( 349      ( 21 Oct 2024 08:33 )             29.3     10-21    140  |  106  |  13  ----------------------------<  89  4.3   |  31  |  0.89    Ca    8.7      21 Oct 2024 08:33        Vancomycin Level, Trough: 15.2 ug/mL (10-17 @ 23:49)    Culture - Abscess with Gram Stain (10.13.24 @ 14:35)   Gram Stain:   No polymorphonuclear cells seen per low power field   MRSA  Urinalysis with Rflx Culture (10.13.24 @ 05:05)   Urine Appearance: Clear  Color: Yellow  Specific Gravity: >1.030  pH Urine: 6.5  Protein, Urine: Negative mg/dL  Glucose Qualitative, Urine: Negative mg/dL  Ketone - Urine: Negative mg/dL  Blood, Urine: Negative  Bilirubin: Negative  Urobilinogen: 0.2 mg/dL  Leukocyte Esterase Concentration: Negative  Nitrite: Negative  Culture - Body Fluid with Gram Stain (10.18.24 @ 18:08)   Gram Stain:   No polymorphonuclear cells seen seen per low power field   No organisms seen per oil power field  Specimen Source: Body Fluid  Culture Results:   No growth  Radiology: all available radiological tests reviewed    IMPRESSION:  Diffuse mild distention of small bowel loops, decreased in caliber   compared with recent pelvic CT 10/13/2024 and without transition point to   suggest bowel obstruction, likely representing a mild evolving ileus.    Small bilateral pleural effusions. Complete atelectasis of the visualized   left lower lobe.    Status post incision and drainage of the previously seen right scrotal   abscess.      IMPRESSION:  Partially visualized moderate sized complex collection in the right   scrotal wall, better depicted on the CT of the pelvis from earlier the   same day, compatible with an abscess.        IMPRESSION:  Large multilocular tubular rim-enhancing fluid collection in the right   hemiscrotum measuring up to 3 cm in diameter with pigtail-like extension   extending along the right spermatic cord. Collection extends to the skin   surface at the level of the base of the penis. Scrotal wall edema.   Constellation of findings and clinical history are most compatible with   an abscess.    Nonspecific mildly dilated fluid-filled small bowel loops in the right   lower abdomen.    Advanced directives addressed: full resuscitation

## 2024-10-21 NOTE — BRIEF OPERATIVE NOTE - NSICDXBRIEFPROCEDURE_GEN_ALL_CORE_FT
PROCEDURES:  Incision and drainage of abscess of scrotal wall 13-Oct-2024 14:15:13  Angelo Herring  

## 2024-10-21 NOTE — PROGRESS NOTE ADULT - SUBJECTIVE AND OBJECTIVE BOX
Patient is a 64y old  Male who presents with a chief complaint of scrotal pain (18 Oct 2024 10:11)      INTERVAL HPI/OVERNIGHT EVENTS: Pt seen and examined at bedside. States that he is noticing more swelling and tenderness in his right groin area.    MEDICATIONS  (STANDING):  ampicillin/sulbactam  IVPB 3 Gram(s) IV Intermittent every 6 hours  chlorhexidine 4% Liquid 1 Application(s) Topical <User Schedule>  DULoxetine 60 milliGRAM(s) Oral daily  influenza   Vaccine 0.5 milliLiter(s) IntraMuscular once  levothyroxine 75 MICROGram(s) Oral daily  polyethylene glycol 3350 17 Gram(s) Oral daily  predniSONE   Tablet 10 milliGRAM(s) Oral daily  senna 2 Tablet(s) Oral at bedtime  sodium chloride 0.9%. 1000 milliLiter(s) (125 mL/Hr) IV Continuous <Continuous>  sodium chloride 0.9%. 750 milliLiter(s) (75 mL/Hr) IV Continuous <Continuous>  tiotropium 2.5 MICROgram(s)/olodaterol 2.5 MICROgram(s) (STIOLTO) Inhaler 2 Puff(s) Inhalation daily  traZODone 100 milliGRAM(s) Oral at bedtime  vancomycin  IVPB 750 milliGRAM(s) IV Intermittent every 12 hours    MEDICATIONS  (PRN):  acetaminophen     Tablet .. 650 milliGRAM(s) Oral every 6 hours PRN Temp greater or equal to 38C (100.4F), Mild Pain (1 - 3)  albuterol    90 MICROgram(s) HFA Inhaler 2 Puff(s) Inhalation every 6 hours PRN Bronchospasm  bisacodyl 5 milliGRAM(s) Oral every 12 hours PRN Constipation  HYDROmorphone  Injectable 1 milliGRAM(s) IV Push every 4 hours PRN Severe Pain (7 - 10)  ondansetron Injectable 4 milliGRAM(s) IV Push every 6 hours PRN Nausea and/or Vomiting  oxyCODONE    IR 5 milliGRAM(s) Oral every 4 hours PRN Moderate Pain (4 - 6)      Allergies    No Known Allergies    Intolerances      Vital Signs Last 24 Hrs  T(C): 36.6 (18 Oct 2024 08:34), Max: 36.6 (17 Oct 2024 15:51)  T(F): 97.8 (18 Oct 2024 08:34), Max: 97.8 (17 Oct 2024 15:51)  HR: 64 (18 Oct 2024 09:09) (57 - 64)  BP: 92/62 (18 Oct 2024 08:34) (89/66 - 129/92)  BP(mean): --  RR: 17 (18 Oct 2024 08:34) (17 - 18)  SpO2: 83% (18 Oct 2024 08:34) (83% - 97%)    Parameters below as of 18 Oct 2024 08:34  Patient On (Oxygen Delivery Method): nasal cannula        PHYSICAL EXAM:  GENERAL: NAD  HEENT: anicteric, moist mucous membranes  CHEST/LUNG:  CTA b/l, no rales, wheezes, or rhonchi  HEART:  RRR, S1, S2  ABDOMEN: soft, nontender, non distended  : Scrotal wound covered in surgical dressing. +Right inguinal swelling, tenderness to palpation  EXTREMITIES: no edema, cyanosis, or calf tenderness  NERVOUS SYSTEM: answers questions and follows commands appropriately    LABS:                        9.5    26.44 )-----------( 301      ( 18 Oct 2024 08:16 )             29.2     CBC Full  -  ( 18 Oct 2024 08:16 )  WBC Count : 26.44 K/uL  Hemoglobin : 9.5 g/dL  Hematocrit : 29.2 %  Platelet Count - Automated : 301 K/uL  Mean Cell Volume : 113.2 fl  Mean Cell Hemoglobin : 36.8 pg  Mean Cell Hemoglobin Concentration : 32.5 gm/dL  Auto Neutrophil # : 3.17 K/uL  Auto Lymphocyte # : 23.00 K/uL  Auto Monocyte # : 0.00 K/uL  Auto Eosinophil # : 0.26 K/uL  Auto Basophil # : 0.00 K/uL  Auto Neutrophil % : 12.0 %  Auto Lymphocyte % : 87.0 %  Auto Monocyte % : 0.0 %  Auto Eosinophil % : 1.0 %  Auto Basophil % : 0.0 %    18 Oct 2024 08:16    138    |  104    |  11     ----------------------------<  85     4.0     |  30     |  0.79     Ca    8.6        18 Oct 2024 08:16    TPro  5.5    /  Alb  2.2    /  TBili  0.2    /  DBili  x      /  AST  24     /  ALT  27     /  AlkPhos  54     18 Oct 2024 08:16      Urinalysis Basic - ( 18 Oct 2024 08:16 )    Color: x / Appearance: x / SG: x / pH: x  Gluc: 85 mg/dL / Ketone: x  / Bili: x / Urobili: x   Blood: x / Protein: x / Nitrite: x   Leuk Esterase: x / RBC: x / WBC x   Sq Epi: x / Non Sq Epi: x / Bacteria: x      CAPILLARY BLOOD GLUCOSE            Culture - Abscess with Gram Stain (collected 10-13-24 @ 14:35)  Source: .Abscess  Gram Stain (10-14-24 @ 00:09):    No polymorphonuclear cells seen per low power field    Few Gram Positive Cocci in Clusters seen per oil power field  Final Report (10-18-24 @ 12:53):    Numerous Methicillin Resistant Staphylococcus aureus  Organism: Methicillin resistant Staphylococcus aureus (10-18-24 @ 12:53)  Organism: Methicillin resistant Staphylococcus aureus (10-18-24 @ 12:53)      Method Type: HARVINDER      -  Clindamycin: S <=0.25      -  Daptomycin: S 0.5      -  Erythromycin: R >4      -  Gentamicin: S <=1 Should not be used as monotherapy      -  Linezolid: S 2      -  Oxacillin: R >2      -  Penicillin: R >8      -  Rifampin: S <=1 Should not be used as monotherapy      -  Tetracycline: S <=1      -  Trimethoprim/Sulfamethoxazole: S <=0.5/9.5      -  Vancomycin: S 2    Urinalysis with Rflx Culture (collected 10-13-24 @ 05:05)    Culture - Blood (collected 10-13-24 @ 01:49)  Source: .Blood BLOOD  Final Report (10-18-24 @ 11:00):    No growth at 5 days        RADIOLOGY & ADDITIONAL TESTS:    Personally reviewed.     Consultant(s) Notes Reviewed:  [x] YES  [ ] NO

## 2024-10-21 NOTE — BRIEF OPERATIVE NOTE - NSICDXBRIEFPOSTOP_GEN_ALL_CORE_FT
POST-OP DIAGNOSIS:  Scrotal abscess 13-Oct-2024 14:14:27  Angelo Herring L  

## 2024-10-21 NOTE — BRIEF OPERATIVE NOTE - NSICDXBRIEFPREOP_GEN_ALL_CORE_FT
PRE-OP DIAGNOSIS:  Scrotal abscess 13-Oct-2024 14:13:50  Angelo Herring  

## 2024-10-21 NOTE — PROGRESS NOTE ADULT - SUBJECTIVE AND OBJECTIVE BOX
Pt seen and examined at bedside. s/p takeback I+D on 10/18/24. Reports right inguinal pain and discomfort. Has wet to dry dressing in place over wound. Denies any fevers, chills.    PE    General: No distress, No anxiety  VITALS  T(C): 36.5 (10-21-24 @ 09:19), Max: 37.4 (10-20-24 @ 16:35)  HR: 66 (10-21-24 @ 08:25) (63 - 70)  BP: 95/67 (10-21-24 @ 09:19) (90/62 - 100/74)  RR: 17 (10-21-24 @ 09:19) (17 - 18)  SpO2: 93% (10-21-24 @ 09:19) (93% - 97%)              HEENT: Normocephalic, no icterus , EOM full , No epistaxis  Lung    : No resp distress  Abdo:   : Soft, Non tender, No guarding, mild  distension   Back    : No CVAT b/l  Genitalia Male: No Tavarez, scrotal wound CDI, no purulence, no drainage. tunica vaginalis exposed but pink and clean. + right inguinal induration and edema, tenderness to palpation over right inguinal area.  Neuro   : A&Ox3        LABS                        9.5    29.31 )-----------( 349      ( 21 Oct 2024 08:33 )             29.3   10-21    140  |  106  |  13  ----------------------------<  89  4.3   |  31  |  0.89    Ca    8.7      21 Oct 2024 08:33

## 2024-10-21 NOTE — BRIEF OPERATIVE NOTE - OPERATION/FINDINGS
loculated collection in the inguinal region opened and drained. hydrocele opened and drained. all irrigated
Right scrotal abscess
reaccumulation of fluid in groin, with hematoma. evacuated, irrigated, and packed. inguinal incision placed to increase drainage

## 2024-10-21 NOTE — PROGRESS NOTE ADULT - ASSESSMENT
65 y/o M w/ PMH of COPD, CLL, lyme disease, hypothyoridism, cardiac arrest, p/w scrotal pain. Patient states that five days ago a dog jumped on him and cause a small puncture wound to his scrotum. Initially, it was only a small red leslie, but then it started to progress over time and increased in swelling and pain.  Patient also, complains about intermittent abdominal discomfort, but is vague about the symptoms. Denies, nausea, vomiting, chest pain, shortness of breath, cough, runny nose, sore throat. As per ED physician, patient was seen by the surgery p a covering for urology team in the emergency room. Wbc ct 29 UA positive, imaging shows Large multilocular tubular rim-enhancing fluid collection in the right hemiscrotum measuring up to 3 cm in diameter with pigtail-like extension extending along the right spermatic cord. Collection extends to the skin surface at the level of the base of the penis. Scrotal wall edema. Started on IV abx, urology consulted.     1. Severe scrotal abscess/cellulitis s/p debridement. Pyuria. Complicated UTI. CLL. Leukocytosis. s/p Dog bite  - imaging reviewed repeat CT noted  - s/p I & D and repeat debridement 10/18   - urology f/u noted  - s/p IV zosyn [id number]  #5, on unasyn 3gmq6h #3-4  - on vancomycin  [id number] #9 trough therapeutic  - continue with antibiotic coverage   - f/u urine cx blood cx no growth, abscess cx growing MRSA, body fluid cx 10/18 no growth so far   - tolerating abx well so far; no side effects noted  - reason for abx use and side effects reviewed with patient  - supportive care  - fu cbc    Clinical team may change from intravenous to oral antibiotics when the following criteria are met:   1. Patient is clinically improving/stable       a)	Improved signs and symptoms of infection from initial presentation       b)	Afebrile for 24 hours       c)	Leukocytosis trending towards normal range   2. Patient is tolerating oral intake   3. Initial/repeat blood cultures are negative     when above met change to po bactrim 1 DS BID x 14-21 day course

## 2024-10-21 NOTE — PROGRESS NOTE ADULT - ASSESSMENT
65 yo Male with scrotal abscess, tracking up to right inguinal canal s/p takeback I+D 10/18/24. Had hydrocele, probably reactive, but tunica vaginalis opened, drained and fluid sent for culture, loosely closed with widely spaced interrupted suture to allow drainage. Pt with wet to dry dressing in place. Now with induration and edema tracking up right inguinal area.    Recommend  ABX per ID  NPO for takeback drainage and packing vs. incision and drainage.   Wet to dry dressings, will likely need VNS for wound care at dispo    Case discussed with Dr. Caro

## 2024-10-22 LAB
CHROM ANALY OVERALL INTERP SPEC-IMP: SIGNIFICANT CHANGE UP
HCT VFR BLD CALC: 29.2 % — LOW (ref 39–50)
HGB BLD-MCNC: 9.4 G/DL — LOW (ref 13–17)
MCHC RBC-ENTMCNC: 32.2 GM/DL — SIGNIFICANT CHANGE UP (ref 32–36)
MCHC RBC-ENTMCNC: 36.6 PG — HIGH (ref 27–34)
MCV RBC AUTO: 113.6 FL — HIGH (ref 80–100)
PLATELET # BLD AUTO: 352 K/UL — SIGNIFICANT CHANGE UP (ref 150–400)
RBC # BLD: 2.57 M/UL — LOW (ref 4.2–5.8)
RBC # FLD: 13.6 % — SIGNIFICANT CHANGE UP (ref 10.3–14.5)
WBC # BLD: 32.02 K/UL — HIGH (ref 3.8–10.5)
WBC # FLD AUTO: 32.02 K/UL — HIGH (ref 3.8–10.5)

## 2024-10-22 PROCEDURE — 99233 SBSQ HOSP IP/OBS HIGH 50: CPT

## 2024-10-22 RX ORDER — HYDROMORPHONE HCL/0.9% NACL/PF 6 MG/30 ML
0.5 PATIENT CONTROLLED ANALGESIA SYRINGE INTRAVENOUS ONCE
Refills: 0 | Status: DISCONTINUED | OUTPATIENT
Start: 2024-10-22 | End: 2024-10-22

## 2024-10-22 RX ORDER — HYDROMORPHONE HCL/0.9% NACL/PF 6 MG/30 ML
1 PATIENT CONTROLLED ANALGESIA SYRINGE INTRAVENOUS ONCE
Refills: 0 | Status: DISCONTINUED | OUTPATIENT
Start: 2024-10-22 | End: 2024-10-22

## 2024-10-22 RX ADMIN — Medication 75 MICROGRAM(S): at 05:15

## 2024-10-22 RX ADMIN — Medication 0.5 MILLIGRAM(S): at 11:54

## 2024-10-22 RX ADMIN — Medication 1 MILLIGRAM(S): at 09:56

## 2024-10-22 RX ADMIN — OXYCODONE HYDROCHLORIDE 5 MILLIGRAM(S): 30 TABLET ORAL at 08:07

## 2024-10-22 RX ADMIN — SODIUM CHLORIDE 100 MILLILITER(S): 9 INJECTION, SOLUTION INTRAMUSCULAR; INTRAVENOUS; SUBCUTANEOUS at 09:23

## 2024-10-22 RX ADMIN — TRAZODONE HYDROCHLORIDE 100 MILLIGRAM(S): 100 TABLET ORAL at 20:36

## 2024-10-22 RX ADMIN — SODIUM CHLORIDE 100 MILLILITER(S): 9 INJECTION, SOLUTION INTRAMUSCULAR; INTRAVENOUS; SUBCUTANEOUS at 20:51

## 2024-10-22 RX ADMIN — AMPICILLIN AND SULBACTAM 200 GRAM(S): 2; 1 INJECTION, POWDER, FOR SOLUTION INTRAMUSCULAR; INTRAVENOUS at 09:23

## 2024-10-22 RX ADMIN — AMPICILLIN AND SULBACTAM 200 GRAM(S): 2; 1 INJECTION, POWDER, FOR SOLUTION INTRAMUSCULAR; INTRAVENOUS at 20:36

## 2024-10-22 RX ADMIN — TIOTROPIUM BROMIDE AND OLODATEROL 2 PUFF(S): 3.124; 2.736 SPRAY, METERED RESPIRATORY (INHALATION) at 08:59

## 2024-10-22 RX ADMIN — AMPICILLIN AND SULBACTAM 200 GRAM(S): 2; 1 INJECTION, POWDER, FOR SOLUTION INTRAMUSCULAR; INTRAVENOUS at 02:00

## 2024-10-22 RX ADMIN — MIDODRINE HYDROCHLORIDE 5 MILLIGRAM(S): 2.5 TABLET ORAL at 11:54

## 2024-10-22 RX ADMIN — VANCOMYCIN HYDROCHLORIDE 250 MILLIGRAM(S): KIT at 12:51

## 2024-10-22 RX ADMIN — OXYCODONE HYDROCHLORIDE 5 MILLIGRAM(S): 30 TABLET ORAL at 18:57

## 2024-10-22 RX ADMIN — CHLORHEXIDINE GLUCONATE 1 APPLICATION(S): 40 SOLUTION TOPICAL at 09:23

## 2024-10-22 RX ADMIN — MIDODRINE HYDROCHLORIDE 5 MILLIGRAM(S): 2.5 TABLET ORAL at 17:18

## 2024-10-22 RX ADMIN — DULOXETINE HYDROCHLORIDE 60 MILLIGRAM(S): 30 CAPSULE, DELAYED RELEASE ORAL at 09:22

## 2024-10-22 RX ADMIN — AMPICILLIN AND SULBACTAM 200 GRAM(S): 2; 1 INJECTION, POWDER, FOR SOLUTION INTRAMUSCULAR; INTRAVENOUS at 14:01

## 2024-10-22 RX ADMIN — MIDODRINE HYDROCHLORIDE 5 MILLIGRAM(S): 2.5 TABLET ORAL at 05:15

## 2024-10-22 RX ADMIN — Medication 10 MILLIGRAM(S): at 09:23

## 2024-10-22 RX ADMIN — POLYETHYLENE GLYCOL 3350 17 GRAM(S): 17 POWDER, FOR SOLUTION ORAL at 09:56

## 2024-10-22 RX ADMIN — Medication 2 TABLET(S): at 20:36

## 2024-10-22 NOTE — PROGRESS NOTE ADULT - SUBJECTIVE AND OBJECTIVE BOX
HOSPITALIST ATTENDING PROGRESS NOTE    Chart and meds reviewed.  Patient seen and examined.    CC: Scrotal pain    Subjective: SP repeat I&D, in pain. No fever.    All other systems reviewed and found to be negative with the exception of what has been described above.    MEDICATIONS  (STANDING):  ampicillin/sulbactam  IVPB 3 Gram(s) IV Intermittent every 6 hours  chlorhexidine 4% Liquid 1 Application(s) Topical <User Schedule>  DULoxetine 60 milliGRAM(s) Oral daily  HYDROmorphone  Injectable 0.5 milliGRAM(s) IV Push once  influenza   Vaccine 0.5 milliLiter(s) IntraMuscular once  levothyroxine 75 MICROGram(s) Oral daily  midodrine. 5 milliGRAM(s) Oral three times a day  polyethylene glycol 3350 17 Gram(s) Oral daily  predniSONE   Tablet 10 milliGRAM(s) Oral daily  senna 2 Tablet(s) Oral at bedtime  sodium chloride 0.9%. 1000 milliLiter(s) (100 mL/Hr) IV Continuous <Continuous>  tiotropium 2.5 MICROgram(s)/olodaterol 2.5 MICROgram(s) (STIOLTO) Inhaler 2 Puff(s) Inhalation daily  traZODone 100 milliGRAM(s) Oral at bedtime  vancomycin  IVPB 750 milliGRAM(s) IV Intermittent every 12 hours    MEDICATIONS  (PRN):  acetaminophen     Tablet .. 650 milliGRAM(s) Oral every 6 hours PRN Temp greater or equal to 38C (100.4F), Mild Pain (1 - 3)  albuterol    90 MICROgram(s) HFA Inhaler 2 Puff(s) Inhalation every 6 hours PRN Bronchospasm  bisacodyl 5 milliGRAM(s) Oral every 12 hours PRN Constipation  ondansetron Injectable 4 milliGRAM(s) IV Push every 6 hours PRN Nausea and/or Vomiting  oxyCODONE    IR 5 milliGRAM(s) Oral every 4 hours PRN Moderate Pain (4 - 6)    Vital Signs Last 24 Hrs  T(C): 36.3 (22 Oct 2024 08:57), Max: 36.6 (21 Oct 2024 19:30)  T(F): 97.4 (22 Oct 2024 08:57), Max: 97.8 (21 Oct 2024 19:30)  HR: 57 (22 Oct 2024 08:57) (55 - 72)  BP: 84/56 (22 Oct 2024 08:57) (84/56 - 121/86)  RR: 16 (22 Oct 2024 08:57) (14 - 22)  SpO2: 94% (22 Oct 2024 08:57) (94% - 100%)    Parameters below as of 22 Oct 2024 08:57  Patient On (Oxygen Delivery Method): nasal cannula    GEN: NAD  HEENT:  pupils equal and reactive, EOMI, no oropharyngeal lesions, erythema, exudates, oral thrush  NECK:   supple, no carotid bruits  CV:  +S1, +S2, regular, no murmurs  RESP:   lungs clear to auscultation bilaterally, no wheezing, rales, rhonchi, good air entry bilaterally  GI:  abdomen soft, non-tender, non-distended, normal BS  EXT:  no clubbing, no cyanosis, no edema, no calf pain, swelling or erythema  NEURO:  AAOX3, no focal neurological deficits, follows all commands, able to move extremities spontaneously  SKIN:  no ulcers, lesions or rashes  : Packing in place    LABS:                          9.4    32.02 )-----------( 352      ( 22 Oct 2024 07:51 )             29.2     10-21    140  |  106  |  13  ----------------------------<  89  4.3   |  31  |  0.89    Ca    8.7      21 Oct 2024 08:33      Urinalysis Basic - ( 21 Oct 2024 08:33 )  Color: x / Appearance: x / SG: x / pH: x  Gluc: 89 mg/dL / Ketone: x  / Bili: x / Urobili: x   Blood: x / Protein: x / Nitrite: x   Leuk Esterase: x / RBC: x / WBC x   Sq Epi: x / Non Sq Epi: x / Bacteria: x

## 2024-10-22 NOTE — PROGRESS NOTE ADULT - ASSESSMENT
65 y/o M w/ PMH of COPD, CLL, lyme disease, hypothyoridism, cardiac arrest, p/w scrotal pain. Patient states that five days ago a dog jumped on him and cause a small puncture wound to his scrotum. Initially, it was only a small red leslie, but then it started to progress over time and increased in swelling and pain. Patient denies any fever or chills. Patient also, complains about intermittent abdominal discomfort, but is vague about the symptoms. Edbra, nausea, vomiting, chest pain, shortness of breath, cough, runny nose, sore throat. As per ED physician, patient was seen by the surgery pa covering for urology team in the emergency room    #Scrotal abscess.   S/p I&D of scrotal abscess on 10/13. Abscess fluid cx MRSA+. Patient taken back to the OR on 10/18 for I&D. F/U intra op cultures from 10/18 with NGTD. On 10/21 patient noted to have induration and edema tracking up the right inguinal area.  - Scheduled for take back drainage and packing vs I&D with Urology  - ID consulted; Continue IV abx (Unasyn/Vanco)  - Cont to monitor     #Lower abd pain, improved  #Abd distention, improved  CT a/p with contrast on 10/17 with no e/o obstruction. Symptoms improved with passing of BM.  - Bowel regimen  - Gen Surg input appreciated  - Uro following    #Hypotension  - Asymptomatic  - S/p IVF Hydration  - Midodrine 5 mg TID     #Leukocytosis in patient with Hx of CLL  - Hx of CLL, unknown baseline WBC--has not followed up in years  - Monitor CBC    #Hypothyroid  - Continue Levothyroxine    #Anemia  - Likely anemia of chronic disease  - Hgb low, stable    #COPD  - Continue Spiriva  - Continue Prednisone    #Depression  - Continue Duloxetine    #Insomnia  - Continue Trazodone    #DVT ppx  - SCDs 65 y/o M w/ PMH of COPD, CLL, lyme disease, hypothyoridism, cardiac arrest, p/w scrotal pain. Patient states that five days ago a dog jumped on him and cause a small puncture wound to his scrotum. Initially, it was only a small red leslie, but then it started to progress over time and increased in swelling and pain. Patient denies any fever or chills. Patient also, complains about intermittent abdominal discomfort, but is vague about the symptoms. Debra, nausea, vomiting, chest pain, shortness of breath, cough, runny nose, sore throat. As per ED physician, patient was seen by the surgery pa covering for urology team in the emergency room    #Scrotal abscess.   - S/p I&D of scrotal abscess on 10/13. Abscess fluid cx MRSA+. Patient taken back to the OR on 10/18 for I&D. F/U intra op cultures from 10/18 with NGTD. On 10/21 patient noted to have induration and edema tracking up the right inguinal area, repeat I&D on 10/21  - Continue IV abx (Unasyn/Vanco)  - ID on board  - Cont to monitor     #Lower abd pain, improved  #Abd distention, improved  - CT a/p with contrast on 10/17 with no e/o obstruction. Symptoms improved with passing of BM.  - Bowel regimen  - Gen Surg input appreciated  - Uro following    #Hypotension  - Asymptomatic  - S/p IVF Hydration  - Midodrine 5 mg TID     #Leukocytosis in patient with Hx of CLL  - Hx of CLL, unknown baseline WBC--has not followed up in years  - Monitor CBC    #Hypothyroid  - Continue Levothyroxine    #Anemia  - Likely anemia of chronic disease  - Hgb low, stable    #COPD  - Continue Spiriva  - Continue Prednisone    #Depression  - Continue Duloxetine    #Insomnia  - Continue Trazodone    #DVT ppx  - SCDs

## 2024-10-22 NOTE — PROGRESS NOTE ADULT - ASSESSMENT
63 y/o M w/ PMH of COPD, CLL, lyme disease, hypothyoridism, cardiac arrest, p/w scrotal pain. Patient states that five days ago a dog jumped on him and cause a small puncture wound to his scrotum. Initially, it was only a small red leslie, but then it started to progress over time and increased in swelling and pain.  Patient also, complains about intermittent abdominal discomfort, but is vague about the symptoms. Denies, nausea, vomiting, chest pain, shortness of breath, cough, runny nose, sore throat. As per ED physician, patient was seen by the surgery p a covering for urology team in the emergency room. Wbc ct 29 UA positive, imaging shows Large multilocular tubular rim-enhancing fluid collection in the right hemiscrotum measuring up to 3 cm in diameter with pigtail-like extension extending along the right spermatic cord. Collection extends to the skin surface at the level of the base of the penis. Scrotal wall edema. Started on IV abx, urology consulted.     1. Severe scrotal abscess/cellulitis s/p debridement. Pyuria. Complicated UTI. CLL. Leukocytosis. s/p Dog bite  - imaging reviewed repeat CT noted  - s/p I & D and repeat debridement 10/18  - s/p debridement 10/22 noted with hematoma, evacuated   - urology f/u noted  - s/p IV zosyn [id number]  #5, on unasyn 3gmq6h #4-5  - on vancomycin  [id number] #10 trough therapeutic  - continue with antibiotic coverage   - f/u urine cx blood cx no growth, abscess cx growing MRSA, body fluid cx 10/18 no growth so far   - tolerating abx well so far; no side effects noted  - reason for abx use and side effects reviewed with patient  - supportive care  - fu cbc    Clinical team may change from intravenous to oral antibiotics when the following criteria are met:   1. Patient is clinically improving/stable       a)	Improved signs and symptoms of infection from initial presentation       b)	Afebrile for 24 hours       c)	Leukocytosis trending towards normal range   2. Patient is tolerating oral intake   3. Initial/repeat blood cultures are negative     when above met change to po bactrim 1 DS BID x 14-21 day course

## 2024-10-22 NOTE — PROGRESS NOTE ADULT - NS ATTEND AMEND GEN_ALL_CORE FT
This is a 64 year old male with a history of CLL stage 0, never required treatment.  Admitted with scrotal abscess, required multiple I&D, initially on 10/13, 10/18, then 10/21.  Cultures + MRSA, on IV vanco/unasyn.   Still with stage 0 disease, no LAD or SM on CT scan.   Anemia may be due to suppression from infection.    Recommend to check his quantitative immunoglobulins to see if he can benefit from IVIG.    Cont outpatient follow up with Dr. Castaneda after discharge.

## 2024-10-22 NOTE — PROGRESS NOTE ADULT - SUBJECTIVE AND OBJECTIVE BOX
Patient seen at bedside. Patient is POD 1 s/p Incision and drainage of abscess of scrotal wall with reaccumulation of fluid in groin, with hematoma. evacuated, irrigated, and packed. inguinal incision placed to increase drainage. Patient reports he feels much better this morning and no longer has lower abdominal or right inguinal pain. Denies fevers, chills.    PE  VITALS  T(C): 36.3 (10-22-24 @ 08:57), Max: 36.6 (10-21-24 @ 19:30)  HR: 57 (10-22-24 @ 08:57) (55 - 72)  BP: 84/56 (10-22-24 @ 08:57) (84/56 - 121/86)  RR: 16 (10-22-24 @ 08:57) (14 - 22)  SpO2: 94% (10-22-24 @ 08:57) (94% - 100%)               Lung    : No resp distress  Abdo:   : Soft, Non tender, No guarding, No distension   Back    : No CVAT b/l  Extremity: No calf tenderness   Genitalia Male: No Tavarez, Scrotal wound clean/dry with wet to dry dressing, no purulence, no drainage. tunica vaginalis exposed but pink and clean. + right inguinal wound with packing and gauze dressing with some blood no discharge or active bleeding.  Neuro   : A&Ox3        LABS                        9.4    32.02 )-----------( 352      ( 22 Oct 2024 07:51 )             29.2   10-21    140  |  106  |  13  ----------------------------<  89  4.3   |  31  |  0.89    Ca    8.7      21 Oct 2024 08:33     For information on Fall & Injury Prevention, visit: [email or web address] OR  [email or web address] OR  https://www.cdc.gov/steadi/patient.html

## 2024-10-22 NOTE — PROGRESS NOTE ADULT - SUBJECTIVE AND OBJECTIVE BOX
Date of service: 10-22-24 @ 11:35    pt seen and examined  s/p I & D of scrotal abscess 10/13  packing in place; has R groin discomfort  s/p further debridement 10/18 and 10/22  noted with hematoma in OR, evacuated   afebrile    ROS: no fever or chills; denies dizziness, no HA, no SOB or cough, no abdominal pain, no diarrhea or constipation; no legs pain, no rashes    MEDICATIONS  (STANDING):  ampicillin/sulbactam  IVPB 3 Gram(s) IV Intermittent every 6 hours  chlorhexidine 4% Liquid 1 Application(s) Topical <User Schedule>  DULoxetine 60 milliGRAM(s) Oral daily  influenza   Vaccine 0.5 milliLiter(s) IntraMuscular once  levothyroxine 75 MICROGram(s) Oral daily  midodrine. 5 milliGRAM(s) Oral three times a day  polyethylene glycol 3350 17 Gram(s) Oral daily  predniSONE   Tablet 10 milliGRAM(s) Oral daily  senna 2 Tablet(s) Oral at bedtime  sodium chloride 0.9%. 1000 milliLiter(s) (100 mL/Hr) IV Continuous <Continuous>  tiotropium 2.5 MICROgram(s)/olodaterol 2.5 MICROgram(s) (STIOLTO) Inhaler 2 Puff(s) Inhalation daily  traZODone 100 milliGRAM(s) Oral at bedtime  vancomycin  IVPB 750 milliGRAM(s) IV Intermittent every 12 hours      Vital Signs Last 24 Hrs  T(C): 36.3 (22 Oct 2024 08:57), Max: 36.6 (21 Oct 2024 19:30)  T(F): 97.4 (22 Oct 2024 08:57), Max: 97.8 (21 Oct 2024 19:30)  HR: 57 (22 Oct 2024 08:57) (55 - 72)  BP: 84/56 (22 Oct 2024 08:57) (84/56 - 121/86)  BP(mean): --  RR: 16 (22 Oct 2024 08:57) (14 - 22)  SpO2: 94% (22 Oct 2024 08:57) (94% - 100%)    Parameters below as of 22 Oct 2024 08:57  Patient On (Oxygen Delivery Method): nasal cannula    PE:  Constitutional: NAD  HEENT: NC/AT, EOMI, PERRLA, conjunctivae clear; ears and nose atraumatic; pharynx benign  Neck: supple; thyroid not palpable  Back: no tenderness  Respiratory: respiratory effort normal; clear to auscultation  Cardiovascular: S1S2 regular, no murmurs  Abdomen: soft, not tender, not distended, positive BS; liver and spleen WNL  Genitourinary: no suprapubic tenderness; scrotal edema tenderness packing in place   Lymphatic: no LN palpable  Musculoskeletal: no muscle tenderness, no joint swelling or tenderness  Extremities: no pedal edema  Neurological/ Psychiatric: AxOx3, Judgement and insight normal;  moving all extremities  Skin: no rashes; no palpable lesions    Labs: all available labs reviewed                                              9.4    32.02 )-----------( 352      ( 22 Oct 2024 07:51 )             29.2     10-21    140  |  106  |  13  ----------------------------<  89  4.3   |  31  |  0.89    Ca    8.7      21 Oct 2024 08:33          Vancomycin Level, Trough: 15.2 ug/mL (10-17 @ 23:49)    Culture - Abscess with Gram Stain (10.13.24 @ 14:35)   Gram Stain:   No polymorphonuclear cells seen per low power field   MRSA  Urinalysis with Rflx Culture (10.13.24 @ 05:05)   Urine Appearance: Clear  Color: Yellow  Specific Gravity: >1.030  pH Urine: 6.5  Protein, Urine: Negative mg/dL  Glucose Qualitative, Urine: Negative mg/dL  Ketone - Urine: Negative mg/dL  Blood, Urine: Negative  Bilirubin: Negative  Urobilinogen: 0.2 mg/dL  Leukocyte Esterase Concentration: Negative  Nitrite: Negative  Culture - Body Fluid with Gram Stain (10.18.24 @ 18:08)   Gram Stain:   No polymorphonuclear cells seen seen per low power field   No organisms seen per oil power field  Specimen Source: Body Fluid  Culture Results:   No growth  Radiology: all available radiological tests reviewed    IMPRESSION:  Diffuse mild distention of small bowel loops, decreased in caliber   compared with recent pelvic CT 10/13/2024 and without transition point to   suggest bowel obstruction, likely representing a mild evolving ileus.    Small bilateral pleural effusions. Complete atelectasis of the visualized   left lower lobe.    Status post incision and drainage of the previously seen right scrotal   abscess.      IMPRESSION:  Partially visualized moderate sized complex collection in the right   scrotal wall, better depicted on the CT of the pelvis from earlier the   same day, compatible with an abscess.        IMPRESSION:  Large multilocular tubular rim-enhancing fluid collection in the right   hemiscrotum measuring up to 3 cm in diameter with pigtail-like extension   extending along the right spermatic cord. Collection extends to the skin   surface at the level of the base of the penis. Scrotal wall edema.   Constellation of findings and clinical history are most compatible with   an abscess.    Nonspecific mildly dilated fluid-filled small bowel loops in the right   lower abdomen.    Advanced directives addressed: full resuscitation

## 2024-10-22 NOTE — PROGRESS NOTE ADULT - ASSESSMENT
65 yo male with scrotal abscess. POD 1 s/p second retake Incision and drainage of abscess of scrotal wall with reaccumulation of fluid in groin, with hematoma. evacuated, irrigated, and packed. inguinal incision placed to increase drainage.  Scrotal dressing changed wet to dry dressing applied. Right inguinal wound packing removed and lightly packed wet gauze with dry dressing.  Recommend  ABX per ID  Wet to dry dressings, will need VNS for wound care at dispo    Case discussed with Dr. Caro

## 2024-10-22 NOTE — PROGRESS NOTE ADULT - SUBJECTIVE AND OBJECTIVE BOX
HPI:    65 y/o M with PMHx of COPD, CLL, prior cardiac arrest presented to the ED with ongoing / worsening R scrotal pain over the past 6-7 days. He stated that a dog had jumped on him and caused a small puncture wound which initially was only a small red leslie. He admitted that over the next few days he developed worsening pain with swelling and redness / warmth.     10/13/24: Seen at bedside, no acute distress, has not follow up with Heme Onc in the past 2-3 years because of 'insurance issues', would like to return to see Dr Castaneda if possible    10/15/24: Seen at bedside in no acute distress.     10/22/24: Seen at bedside along with Dr. Ospina, patient in no acute distress    PAST MEDICAL & SURGICAL HISTORY:    CLL (chronic lymphocytic leukemia)    Chronic obstructive pulmonary disease (COPD)    Hypothyroidism    H/o Lyme disease    Hiatal hernia    History of herniated intervertebral disc    H/O sinus surgery    H/O shoulder surgery        MEDICATIONS  (STANDING):    ampicillin/sulbactam  IVPB 3 Gram(s) IV Intermittent every 6 hours  chlorhexidine 4% Liquid 1 Application(s) Topical <User Schedule>  DULoxetine 60 milliGRAM(s) Oral daily  influenza   Vaccine 0.5 milliLiter(s) IntraMuscular once  levothyroxine 75 MICROGram(s) Oral daily  midodrine. 5 milliGRAM(s) Oral three times a day  polyethylene glycol 3350 17 Gram(s) Oral daily  predniSONE   Tablet 10 milliGRAM(s) Oral daily  senna 2 Tablet(s) Oral at bedtime  sodium chloride 0.9%. 1000 milliLiter(s) (100 mL/Hr) IV Continuous <Continuous>  tiotropium 2.5 MICROgram(s)/olodaterol 2.5 MICROgram(s) (STIOLTO) Inhaler 2 Puff(s) Inhalation daily  traZODone 100 milliGRAM(s) Oral at bedtime  vancomycin  IVPB 750 milliGRAM(s) IV Intermittent every 12 hours    MEDICATIONS  (PRN):    acetaminophen     Tablet .. 650 milliGRAM(s) Oral every 6 hours PRN Temp greater or equal to 38C (100.4F), Mild Pain (1 - 3)  albuterol    90 MICROgram(s) HFA Inhaler 2 Puff(s) Inhalation every 6 hours PRN Bronchospasm  bisacodyl 5 milliGRAM(s) Oral every 12 hours PRN Constipation  ondansetron Injectable 4 milliGRAM(s) IV Push every 6 hours PRN Nausea and/or Vomiting  oxyCODONE    IR 5 milliGRAM(s) Oral every 4 hours PRN Moderate Pain (4 - 6)      ALLERGIES:    No Known Allergies      FAMILY HISTORY:    lung cancer (Mother)  brain cancer (Mother)  Alzheimer's disease (Father)      REVIEW OF SYSTEMS:    Constitutional, Eyes, ENT, Cardiovascular, Respiratory, Gastrointestinal, Genitourinary, Musculoskeletal, Integumentary, Neurological, Psychiatric, Endocrine, Heme/Lymph and Allergic/Immunologic review of systems are otherwise negative except as noted in HPI.       VITALS:    Vital Signs Last 24 Hrs  T(C): 36.3 (22 Oct 2024 08:57), Max: 36.6 (21 Oct 2024 19:30)  T(F): 97.4 (22 Oct 2024 08:57), Max: 97.8 (21 Oct 2024 19:30)  HR: 57 (22 Oct 2024 08:57) (55 - 72)  BP: 84/56 (22 Oct 2024 08:57) (84/56 - 121/86)  BP(mean): --  RR: 16 (22 Oct 2024 08:57) (14 - 22)  SpO2: 94% (22 Oct 2024 08:57) (94% - 100%)    Parameters below as of 22 Oct 2024 08:57  Patient On (Oxygen Delivery Method): nasal cannula      PHYSICAL:    Constitutional: no acute distress, overall disheveled   Eyes: no conjunctival infection, anicteric.   ENT: pharynx is unremarkable  Neck: supple without JVD  Pulmonary: clear to auscultation bilaterally  Cardiac: RRR  Vascular: no calf tenderness, venous stasis changes  Abdomen: normoactive bowel sounds, soft and nontender, no hepatosplenomegaly or masses appreciated.   Lymphatic: no peripheral adenopathy appreciated.   Musculoskeletal: full range of motion and no deformities appreciated.   Skin: scrotum with erythema and swelling R > L side    Neurology: awake, alert, oriented; no obvious focal deficits        LABS:                          9.4    32.02 )-----------( 352      ( 22 Oct 2024 07:51 )             29.2         10-21    140  |  106  |  13  ----------------------------<  89  4.3   |  31  |  0.89    Ca    8.7      21 Oct 2024 08:33        PT/INR - ( 13 Oct 2024 07:54 )   PT: 11.6 sec;   INR: 0.98 ratio         PTT - ( 13 Oct 2024 07:54 )  PTT:34.3 sec  Urinalysis Basic - ( 13 Oct 2024 07:54 )    Color: x / Appearance: x / SG: x / pH: x  Gluc: 105 mg/dL / Ketone: x  / Bili: x / Urobili: x   Blood: x / Protein: x / Nitrite: x   Leuk Esterase: x / RBC: x / WBC x   Sq Epi: x / Non Sq Epi: x / Bacteria: x        RADIOLOGY & ADDITIONAL STUDIES:      CT Pelvis w/ IV Cont (10.13.24 @ 02:18)    FINDINGS:  BLADDER: Distended.  REPRODUCTIVE ORGANS: Prostate gland is not enlarged.  LYMPH NODES: Asymmetric enlarged right inguinal lymph nodes;   representative lymph node measuring 1.8 x 1.0 cm.    VISUALIZED PORTIONS:  ABDOMINAL ORGANS: Within normal limits.  BOWEL: Several dilated fluid-filled small bowel loops in the right lower   abdomen.  PERITONEUM/RETROPERITONEUM: Within normal limits.  VESSELS: Mild atherosclerotic calcifications.  ABDOMINAL WALL: Large multilocular tubular rim-enhancing fluid collection   in the right hemiscrotum measuring up to 3 cm in diameter with tail like   extension extending along the right spermatic cord. Collection extends to   the skin surface at the level of the base of the penis. Scrotal wall   edema.  BONES: Degenerative changes of the spine.    IMPRESSION:    Large multilocular tubular rim-enhancing fluid collection in the right   hemiscrotum measuring up to 3 cm in diameter with pigtail-like extension   extending along the right spermatic cord. Collection extends to the skin   surface at the level of the base of the penis. Scrotal wall edema.   Constellation of findings and clinical history are most compatible with   an abscess.    Nonspecific mildly dilated fluid-filled small bowel loops in the right   lower abdomen.          US Testicles (10.13.24 @ 04:41)    FINDINGS:    RIGHT:  Right testis: 3.8 cm x 3.4 cm x 2.6 cm. Normal echogenicity and  echotexture with no masses or areas of architectural distortion. Normal   arterial and venous blood flow pattern.  Right epididymis: Within normal limits.  Right hydrocele: Moderate and complex.  Right varicocele: None.  Right scrotal wall: Thickened to 1.2 cm. Partially visualized complex   collection in the right scrotal wall measuring up to 6.5 cm x 2.5 cm x   4.2 cm with adjacent hyperemia, better depicted on the CT of the pelvis   from earlier the same day, compatible with an abscess.    LEFT:  Left testis: 4.5 cm x 2.8 cm x 2.8 cm. Normal echogenicity and   echotexture with no masses or areas of architectural distortion. Normal   arterial and venous blood flow pattern.  Left epididymis: 0.2 cm x 0.2 cm epididymal head cyst.  Left hydrocele: Small.  Left varicocele: None.  Left scrotal wall: Thickened to 0.6 cm.    IMPRESSION:    Partially visualized moderate sized complex collection in the right   scrotal wall, better depicted on the CT of the pelvis from earlier the   same day, compatible with an abscess.

## 2024-10-22 NOTE — PROGRESS NOTE ADULT - ASSESSMENT
65 y/o M with known CLL from a few years ago, followed with Heme Onc Dr Castaneda but has not been seen in the office in 2-3 years currently admitted with scrotal swelling / infection      # Leukocytosis & Macrocytic Anemia with Known CLL    - WBC count currently -32 K/ul  - Patient has known CLL from prior, has not followed up outpatient in a few years  - Baseline WBC count from 2022 seems to be about 15 - 18K  - Hgb currently ~9.4 g/dL   - MCV >110  - Currently admitted with acute scrotal swelling / pain; Uro & Surgery following, s/p I & D on 10/13, 10/18 & 10/21 ---> likely contributory to elevated WBC and suppressed Hgb  - Can repeat serum Flow Cyto but wait since results can be skewed in setting of acute infectious / inflammatory process  - Anemia / hemolysis- unremarkable, except low iron, but Ferritin high > three times the normal range.  - Patient admitted he would like to follow up with Dr Castaneda again as long as insurance allows  - Continue to trend serial CBCs while admitted  - WBC count should trend down with appropriate infection management / tx  - Continue supportive measures    Adria Dang, RICCO, FNP-C  Hematology/Oncology [facility]  [phone]

## 2024-10-23 LAB
ANION GAP SERPL CALC-SCNC: 2 MMOL/L — LOW (ref 5–17)
BUN SERPL-MCNC: 15 MG/DL — SIGNIFICANT CHANGE UP (ref 7–23)
CALCIUM SERPL-MCNC: 8.9 MG/DL — SIGNIFICANT CHANGE UP (ref 8.5–10.1)
CHLORIDE SERPL-SCNC: 109 MMOL/L — HIGH (ref 96–108)
CHROM ANALY INTERPHASE BLD FISH-IMP: SIGNIFICANT CHANGE UP
CO2 SERPL-SCNC: 30 MMOL/L — SIGNIFICANT CHANGE UP (ref 22–31)
CREAT SERPL-MCNC: 0.82 MG/DL — SIGNIFICANT CHANGE UP (ref 0.5–1.3)
CULTURE RESULTS: SIGNIFICANT CHANGE UP
EGFR: 99 ML/MIN/1.73M2 — SIGNIFICANT CHANGE UP
GLUCOSE BLDC GLUCOMTR-MCNC: 94 MG/DL — SIGNIFICANT CHANGE UP (ref 70–99)
GLUCOSE SERPL-MCNC: 89 MG/DL — SIGNIFICANT CHANGE UP (ref 70–99)
HCT VFR BLD CALC: 26.9 % — LOW (ref 39–50)
HGB BLD-MCNC: 8.6 G/DL — LOW (ref 13–17)
IGA FLD-MCNC: 161 MG/DL — SIGNIFICANT CHANGE UP (ref 84–499)
IGG FLD-MCNC: 585 MG/DL — LOW (ref 610–1660)
IGM SERPL-MCNC: 54 MG/DL — SIGNIFICANT CHANGE UP (ref 35–242)
KAPPA LC SER QL IFE: 1.52 MG/DL — SIGNIFICANT CHANGE UP (ref 0.33–1.94)
KAPPA/LAMBDA FREE LIGHT CHAIN RATIO, SERUM: 0.99 RATIO — SIGNIFICANT CHANGE UP (ref 0.26–1.65)
LAMBDA LC SER QL IFE: 1.53 MG/DL — SIGNIFICANT CHANGE UP (ref 0.57–2.63)
MCHC RBC-ENTMCNC: 32 GM/DL — SIGNIFICANT CHANGE UP (ref 32–36)
MCHC RBC-ENTMCNC: 36.9 PG — HIGH (ref 27–34)
MCV RBC AUTO: 115.5 FL — HIGH (ref 80–100)
PLATELET # BLD AUTO: 352 K/UL — SIGNIFICANT CHANGE UP (ref 150–400)
POTASSIUM SERPL-MCNC: 4.2 MMOL/L — SIGNIFICANT CHANGE UP (ref 3.5–5.3)
POTASSIUM SERPL-SCNC: 4.2 MMOL/L — SIGNIFICANT CHANGE UP (ref 3.5–5.3)
RBC # BLD: 2.33 M/UL — LOW (ref 4.2–5.8)
RBC # FLD: 13.9 % — SIGNIFICANT CHANGE UP (ref 10.3–14.5)
SODIUM SERPL-SCNC: 141 MMOL/L — SIGNIFICANT CHANGE UP (ref 135–145)
SPECIMEN SOURCE: SIGNIFICANT CHANGE UP
WBC # BLD: 29.14 K/UL — HIGH (ref 3.8–10.5)
WBC # FLD AUTO: 29.14 K/UL — HIGH (ref 3.8–10.5)

## 2024-10-23 PROCEDURE — 99232 SBSQ HOSP IP/OBS MODERATE 35: CPT

## 2024-10-23 PROCEDURE — 71275 CT ANGIOGRAPHY CHEST: CPT | Mod: 26

## 2024-10-23 RX ORDER — HYDROMORPHONE HCL/0.9% NACL/PF 6 MG/30 ML
1 PATIENT CONTROLLED ANALGESIA SYRINGE INTRAVENOUS ONCE
Refills: 0 | Status: DISCONTINUED | OUTPATIENT
Start: 2024-10-23 | End: 2024-10-23

## 2024-10-23 RX ORDER — FLUTICASONE PROPIONATE AND SALMETEROL XINAFOATE 230; 21 UG/1; UG/1
1 AEROSOL, METERED RESPIRATORY (INHALATION)
Refills: 0 | Status: DISCONTINUED | OUTPATIENT
Start: 2024-10-23 | End: 2024-10-24

## 2024-10-23 RX ORDER — TIOTROPIUM BROMIDE INHALATION SPRAY 1.56 UG/1
2 SPRAY, METERED RESPIRATORY (INHALATION) DAILY
Refills: 0 | Status: DISCONTINUED | OUTPATIENT
Start: 2024-10-23 | End: 2024-10-24

## 2024-10-23 RX ORDER — VANCOMYCIN HYDROCHLORIDE 50 MG/ML
750 KIT ORAL EVERY 12 HOURS
Refills: 0 | Status: DISCONTINUED | OUTPATIENT
Start: 2024-10-23 | End: 2024-10-24

## 2024-10-23 RX ORDER — ACETAMINOPHEN 500 MG
1000 TABLET ORAL ONCE
Refills: 0 | Status: DISCONTINUED | OUTPATIENT
Start: 2024-10-23 | End: 2024-10-24

## 2024-10-23 RX ADMIN — AMPICILLIN AND SULBACTAM 200 GRAM(S): 2; 1 INJECTION, POWDER, FOR SOLUTION INTRAMUSCULAR; INTRAVENOUS at 02:08

## 2024-10-23 RX ADMIN — AMPICILLIN AND SULBACTAM 200 GRAM(S): 2; 1 INJECTION, POWDER, FOR SOLUTION INTRAMUSCULAR; INTRAVENOUS at 20:29

## 2024-10-23 RX ADMIN — OXYCODONE HYDROCHLORIDE 5 MILLIGRAM(S): 30 TABLET ORAL at 16:29

## 2024-10-23 RX ADMIN — Medication 2 PUFF(S): at 20:27

## 2024-10-23 RX ADMIN — OXYCODONE HYDROCHLORIDE 5 MILLIGRAM(S): 30 TABLET ORAL at 16:59

## 2024-10-23 RX ADMIN — CHLORHEXIDINE GLUCONATE 1 APPLICATION(S): 40 SOLUTION TOPICAL at 11:32

## 2024-10-23 RX ADMIN — Medication 75 MICROGRAM(S): at 05:30

## 2024-10-23 RX ADMIN — MIDODRINE HYDROCHLORIDE 5 MILLIGRAM(S): 2.5 TABLET ORAL at 18:07

## 2024-10-23 RX ADMIN — Medication 1 MILLIGRAM(S): at 18:07

## 2024-10-23 RX ADMIN — Medication 1 MILLIGRAM(S): at 18:37

## 2024-10-23 RX ADMIN — VANCOMYCIN HYDROCHLORIDE 250 MILLIGRAM(S): KIT at 22:02

## 2024-10-23 RX ADMIN — OXYCODONE HYDROCHLORIDE 5 MILLIGRAM(S): 30 TABLET ORAL at 11:05

## 2024-10-23 RX ADMIN — Medication 2 TABLET(S): at 22:02

## 2024-10-23 RX ADMIN — OXYCODONE HYDROCHLORIDE 5 MILLIGRAM(S): 30 TABLET ORAL at 02:21

## 2024-10-23 RX ADMIN — MIDODRINE HYDROCHLORIDE 5 MILLIGRAM(S): 2.5 TABLET ORAL at 05:30

## 2024-10-23 RX ADMIN — Medication 10 MILLIGRAM(S): at 09:38

## 2024-10-23 RX ADMIN — TIOTROPIUM BROMIDE AND OLODATEROL 2 PUFF(S): 3.124; 2.736 SPRAY, METERED RESPIRATORY (INHALATION) at 07:56

## 2024-10-23 RX ADMIN — DULOXETINE HYDROCHLORIDE 60 MILLIGRAM(S): 30 CAPSULE, DELAYED RELEASE ORAL at 09:38

## 2024-10-23 RX ADMIN — SODIUM CHLORIDE 100 MILLILITER(S): 9 INJECTION, SOLUTION INTRAMUSCULAR; INTRAVENOUS; SUBCUTANEOUS at 09:38

## 2024-10-23 RX ADMIN — OXYCODONE HYDROCHLORIDE 5 MILLIGRAM(S): 30 TABLET ORAL at 01:51

## 2024-10-23 RX ADMIN — AMPICILLIN AND SULBACTAM 200 GRAM(S): 2; 1 INJECTION, POWDER, FOR SOLUTION INTRAMUSCULAR; INTRAVENOUS at 14:10

## 2024-10-23 RX ADMIN — AMPICILLIN AND SULBACTAM 200 GRAM(S): 2; 1 INJECTION, POWDER, FOR SOLUTION INTRAMUSCULAR; INTRAVENOUS at 09:38

## 2024-10-23 RX ADMIN — VANCOMYCIN HYDROCHLORIDE 250 MILLIGRAM(S): KIT at 00:16

## 2024-10-23 RX ADMIN — MIDODRINE HYDROCHLORIDE 5 MILLIGRAM(S): 2.5 TABLET ORAL at 14:09

## 2024-10-23 RX ADMIN — SODIUM CHLORIDE 100 MILLILITER(S): 9 INJECTION, SOLUTION INTRAMUSCULAR; INTRAVENOUS; SUBCUTANEOUS at 20:29

## 2024-10-23 RX ADMIN — OXYCODONE HYDROCHLORIDE 5 MILLIGRAM(S): 30 TABLET ORAL at 11:35

## 2024-10-23 RX ADMIN — TRAZODONE HYDROCHLORIDE 100 MILLIGRAM(S): 100 TABLET ORAL at 22:02

## 2024-10-23 RX ADMIN — POLYETHYLENE GLYCOL 3350 17 GRAM(S): 17 POWDER, FOR SOLUTION ORAL at 09:38

## 2024-10-23 NOTE — CONSULT NOTE ADULT - CONSULT REASON
hypoxaemia
CT findings
Leukocytosis, Known CLL
inguinal abscess, r/o ileus
scrotal abcess
scrotal abscess
Scrotal

## 2024-10-23 NOTE — CONSULT NOTE ADULT - CONSULT REQUESTED DATE/TIME
13-Oct-2024 09:06
13-Oct-2024 06:15
13-Oct-2024 11:25
13-Oct-2024 11:19
13-Oct-2024 14:20
17-Oct-2024 19:35
23-Oct-2024 19:02

## 2024-10-23 NOTE — PROGRESS NOTE ADULT - ASSESSMENT
A/P: 64y Male s/p right scrotal washout    Continue scrotal dressing change daily  Continue Unasyn  Ck am labs  Pain MEds PRN  Above discussed with Dr. Caro

## 2024-10-23 NOTE — PROGRESS NOTE ADULT - ASSESSMENT
65 y/o M w/ PMH of COPD, CLL, lyme disease, hypothyoridism, cardiac arrest, p/w scrotal pain. Patient states that five days ago a dog jumped on him and cause a small puncture wound to his scrotum. Initially, it was only a small red leslie, but then it started to progress over time and increased in swelling and pain. Patient denies any fever or chills. Patient also, complains about intermittent abdominal discomfort, but is vague about the symptoms. Debra, nausea, vomiting, chest pain, shortness of breath, cough, runny nose, sore throat. As per ED physician, patient was seen by the surgery pa covering for urology team in the emergency room    #Scrotal abscess.   - S/p I&D of scrotal abscess on 10/13. Abscess fluid cx MRSA+. Patient taken back to the OR on 10/18 for I&D. F/U intra op cultures from 10/18 with NGTD. On 10/21 patient noted to have induration and edema tracking up the right inguinal area, repeat I&D on 10/21  - Continue IV abx (Unasyn/Vanco)  - ID on board  - Cont to monitor     #Lower abd pain, improved  #Abd distention, improved  - CT a/p with contrast on 10/17 with no e/o obstruction. Symptoms improved with passing of BM.  - Bowel regimen  - Gen Surg input appreciated  - Uro following    #Hypotension  - Asymptomatic  - S/p IVF Hydration  - Midodrine 5 mg TID     #Leukocytosis in patient with Hx of CLL  - Hx of CLL, unknown baseline WBC--has not followed up in years  - Monitor CBC    #Hypothyroid  - Continue Levothyroxine    #Anemia  - Likely anemia of chronic disease  - Hgb low, stable    #COPD  - Continue Spiriva  - Continue Prednisone    #Depression  - Continue Duloxetine    #Insomnia  - Continue Trazodone    #DVT ppx  - SCDs   65 y/o M w/ PMH of COPD, CLL, lyme disease, hypothyoridism, cardiac arrest, p/w scrotal pain. Patient states that five days ago a dog jumped on him and cause a small puncture wound to his scrotum. Initially, it was only a small red leslie, but then it started to progress over time and increased in swelling and pain. Patient denies any fever or chills. Patient also, complains about intermittent abdominal discomfort, but is vague about the symptoms. Debra, nausea, vomiting, chest pain, shortness of breath, cough, runny nose, sore throat. As per ED physician, patient was seen by the surgery pa covering for urology team in the emergency room    #Scrotal abscess.   - S/p I&D of scrotal abscess on 10/13. Abscess fluid cx MRSA+. Patient taken back to the OR on 10/18 for I&D. F/U intra op cultures from 10/18 with NGTD. On 10/21 patient noted to have induration and edema tracking up the right inguinal area, repeat I&D on 10/21  - Continue IV abx (Unasyn/Vanco)  - ID on board  - Cont to monitor     #Lower abd pain, improved  #Abd distention, improved  - CT a/p with contrast on 10/17 with no e/o obstruction. Symptoms improved with passing of BM.  - Bowel regimen  - Gen Surg input appreciated  - Uro following    #Hypotension  - Asymptomatic  - S/p IVF Hydration  - Midodrine 5 mg TID     #Leukocytosis in patient with Hx of CLL  - Hx of CLL, unknown baseline WBC--has not followed up in years  - Monitor CBC    #Hypothyroid  - Continue Levothyroxine    #Anemia  - Likely anemia of chronic disease  - Hgb low, stable    #COPD/Hypoxia  - Continue Spiriva  - Continue Prednisone  - Check CTA PE     #Depression  - Continue Duloxetine    #Insomnia  - Continue Trazodone    #DVT ppx  - SCDs   63 y/o M w/ PMH of COPD, CLL, lyme disease, hypothyoridism, cardiac arrest, p/w scrotal pain. Patient states that five days ago a dog jumped on him and cause a small puncture wound to his scrotum. Initially, it was only a small red leslie, but then it started to progress over time and increased in swelling and pain. Patient denies any fever or chills. Patient also, complains about intermittent abdominal discomfort, but is vague about the symptoms. Debra, nausea, vomiting, chest pain, shortness of breath, cough, runny nose, sore throat. As per ED physician, patient was seen by the surgery pa covering for urology team in the emergency room    #Scrotal abscess.   - S/p I&D of scrotal abscess on 10/13. Abscess fluid cx MRSA+. Patient taken back to the OR on 10/18 for I&D. F/U intra op cultures from 10/18 with NGTD. On 10/21 patient noted to have induration and edema tracking up the right inguinal area, repeat I&D on 10/21  - Continue IV abx (Unasyn/Vanco)  - ID on board  - Cont to monitor     #Lower abd pain, improved  #Abd distention, improved  - CT a/p with contrast on 10/17 with no e/o obstruction. Symptoms improved with passing of BM.  - Bowel regimen  - Gen Surg input appreciated  - Uro following    #Hypotension  - Asymptomatic  - S/p IVF Hydration  - Midodrine 5 mg TID     #Leukocytosis in patient with Hx of CLL  - Hx of CLL, unknown baseline WBC--has not followed up in years  - Monitor CBC    #Hypothyroid  - Continue Levothyroxine    #Anemia  - Likely anemia of chronic disease  - Hgb low, stable    #COPD/Hypoxia  - Continue Spiriva  - add advair  - Continue Prednisone 10 daily  - Check CTA PE for persistent hypoxia  - Incentive spirometry    #Depression  - Continue Duloxetine    #Insomnia  - Continue Trazodone    #DVT ppx  - SCDs

## 2024-10-23 NOTE — CONSULT NOTE ADULT - SUBJECTIVE AND OBJECTIVE BOX
HPI:    65 y/o M w/ PMH of COPD, CLL, lyme disease, hypothyoridism, cardiac arrest, p/w scrotal pain. Patient states that five days ago a dog jumped on him and cause a small puncture wound to his scrotum. Initially, it was only a small red leslie, but then it started to progress over time and increased in swelling and pain. Patient denies any fever or chills. Patient also, complains about intermittent abdominal discomfort, but is vague about the symptoms. Debra, nausea, vomiting, chest pain, shortness of breath, cough, runny nose, sore throat.       PSH: Shoulder surgery, sinus surgery     Social Hx: Denies tobacco / etoh, h/o cocaine use     Family Hx: Father - alzheimer's disease, mother - brain / lung cancer    (13 Oct 2024 05:36)      PAST MEDICAL & SURGICAL HISTORY:  CLL (chronic lymphocytic leukemia)      Chronic obstructive pulmonary disease (COPD)      Hypothyroidism      H/o Lyme disease      Hiatal hernia      History of herniated intervertebral disc      H/O sinus surgery      H/O shoulder surgery          Home Medications:  levothyroxine 75 mcg (0.075 mg) oral tablet: 1 tab(s) orally once a day (17 Oct 2024 17:16)  predniSONE 10 mg oral tablet: 1 tab(s) orally once a day (13 Oct 2024 06:05)  Stiolto Respimat 10 ACT 2.5 mcg-2.5 mcg/inh inhalation aerosol: 2 puff(s) inhaled once a day (13 Oct 2024 06:05)      MEDICATIONS  (STANDING):  acetaminophen   IVPB .. 1000 milliGRAM(s) IV Intermittent once  ampicillin/sulbactam  IVPB 3 Gram(s) IV Intermittent every 6 hours  chlorhexidine 4% Liquid 1 Application(s) Topical <User Schedule>  DULoxetine 60 milliGRAM(s) Oral daily  fluticasone propionate/ salmeterol 250-50 MICROgram(s) Diskus 1 Dose(s) Inhalation two times a day  influenza   Vaccine 0.5 milliLiter(s) IntraMuscular once  levothyroxine 75 MICROGram(s) Oral daily  midodrine. 5 milliGRAM(s) Oral three times a day  polyethylene glycol 3350 17 Gram(s) Oral daily  predniSONE   Tablet 10 milliGRAM(s) Oral daily  senna 2 Tablet(s) Oral at bedtime  sodium chloride 0.9%. 1000 milliLiter(s) (100 mL/Hr) IV Continuous <Continuous>  tiotropium 2.5 MICROgram(s) Inhaler 2 Puff(s) Inhalation daily  traZODone 100 milliGRAM(s) Oral at bedtime  vancomycin  IVPB 750 milliGRAM(s) IV Intermittent every 12 hours    MEDICATIONS  (PRN):  acetaminophen     Tablet .. 650 milliGRAM(s) Oral every 6 hours PRN Temp greater or equal to 38C (100.4F), Mild Pain (1 - 3)  albuterol    90 MICROgram(s) HFA Inhaler 2 Puff(s) Inhalation every 6 hours PRN Bronchospasm  bisacodyl 5 milliGRAM(s) Oral every 12 hours PRN Constipation  ondansetron Injectable 4 milliGRAM(s) IV Push every 6 hours PRN Nausea and/or Vomiting  oxyCODONE    IR 5 milliGRAM(s) Oral every 4 hours PRN Moderate Pain (4 - 6)      Allergies    No Known Allergies    Intolerances        SOCIAL HISTORY: Denies tobacco, etoh abuse or illicit drug use    FAMILY HISTORY:  FH: lung cancer (Mother)    FH: brain cancer (Mother)    FH: Alzheimers disease (Father)        Vital Signs Last 24 Hrs  T(C): 37 (23 Oct 2024 15:23), Max: 37 (23 Oct 2024 15:23)  T(F): 98.6 (23 Oct 2024 15:23), Max: 98.6 (23 Oct 2024 15:23)  HR: 63 (23 Oct 2024 18:01) (56 - 79)  BP: 93/67 (23 Oct 2024 18:01) (87/59 - 102/75)  BP(mean): --  RR: 18 (23 Oct 2024 15:23) (18 - 18)  SpO2: 96% (23 Oct 2024 15:23) (91% - 98%)    Parameters below as of 23 Oct 2024 15:23  Patient On (Oxygen Delivery Method): nasal cannula  O2 Flow (L/min): 2          REVIEW OF SYSTEMS:    CONSTITUTIONAL:  As per HPI.  HEENT:  Eyes:  No diplopia or blurred vision. ENT:  No earache, sore throat or runny nose.  CARDIOVASCULAR:  No pressure, squeezing, tightness, heaviness or aching about the chest, neck, axilla or epigastrium.  RESPIRATORY:  No cough, shortness of breath, PND or orthopnea.  GASTROINTESTINAL:  No nausea, vomiting or diarrhea.  GENITOURINARY:  No dysuria, frequency or urgency.  MUSCULOSKELETAL:  As per HPI.  SKIN:  No change in skin, hair or nails.  NEUROLOGIC:  No paresthesias, fasciculations, seizures or weakness.  PSYCHIATRIC:  No disorder of thought or mood.  ENDOCRINE:  No heat or cold intolerance, polyuria or polydipsia.  HEMATOLOGICAL:  No easy bruising or bleedings:  .     PHYSICAL EXAMINATION:    GENERAL APPEARANCE:  Pt. is not currently dyspneic, in no distress. Pt. is alert, oriented, and pleasant.  HEENT:  Pupils are normal and react normally. No icterus. Mucous membranes well colored.  NECK:  Supple. No lymphadenopathy. Jugular venous pressure not elevated. Carotids equal.   HEART:   The cardiac impulse has a normal quality. Regular. Normal S1 and S2. There are no murmurs, rubs or gallops noted  CHEST:  Chest is clear to auscultation. Normal respiratory effort.  ABDOMEN:  Soft and nontender.   EXTREMITIES:  There is no cyanosis, clubbing or edema.   SKIN:  No rash or significant lesions are noted.    LABS:                        8.6    29.14 )-----------( 352      ( 23 Oct 2024 06:33 )             26.9     10-23    141  |  109[H]  |  15  ----------------------------<  89  4.2   |  30  |  0.82    Ca    8.9      23 Oct 2024 06:33              Urinalysis Basic - ( 23 Oct 2024 06:33 )    Color: x / Appearance: x / SG: x / pH: x  Gluc: 89 mg/dL / Ketone: x  / Bili: x / Urobili: x   Blood: x / Protein: x / Nitrite: x   Leuk Esterase: x / RBC: x / WBC x   Sq Epi: x / Non Sq Epi: x / Bacteria: x            RADIOLOGY & ADDITIONAL STUDIES:     CT Angio Chest PE Protocol w/ IV Cont (10.23.24 @ 14:36) >  CT angiogram of the chest was obtained following administration of   intravenous contrast. Approximately 70 cc of Omnipaque 350 was   administered. Coronal, sagittal and MIP images were submitted for review.    No hilar or mediastinal adenopathy is noted.    Heart is normal in size. Calcification of the coronary arteries noted. No   pericardial effusion is noted. Pulmonary arteries are normal in caliber.   No filling defects are noted.    No endobronchial lesion noted. Emphysematous changes are present in both   lungs. Compressive atelectasis is noted involving portions of both lower   lobes. This is secondary to small bilateral pleural effusions.    Below the diaphragm, visualized portions of the abdomen demonstrated   ascites.    Degenerative changes of the spine are noted.    IMPRESSION: No pulmonary embolus noted.    Small bilateral pleural effusions.         HPI:    63 y/o M w/ PMH of COPD, CLL, lyme disease, hypothyoridism, cardiac arrest, p/w scrotal pain. Patient states that five days ago a dog jumped on him and cause a small puncture wound to his scrotum. Initially, it was only a small red leslie, but then it started to progress over time and increased in swelling and pain. Patient denies any fever or chills. Patient also, complains about intermittent abdominal discomfort, but is vague about the symptoms. Debra, nausea, vomiting, chest pain, shortness of breath, cough, runny nose, sore throat. pat seen for pulmonary eval, for hypoxaemia, on 2lpm nc sat 96%, lying in bed, no respiratory distress.      PSH: Shoulder surgery, sinus surgery     Social Hx: Denies tobacco / etoh, h/o cocaine use     Family Hx: Father - alzheimer's disease, mother - brain / lung cancer    (13 Oct 2024 05:36)      PAST MEDICAL & SURGICAL HISTORY:  CLL (chronic lymphocytic leukemia)      Chronic obstructive pulmonary disease (COPD)      Hypothyroidism      H/o Lyme disease      Hiatal hernia      History of herniated intervertebral disc      H/O sinus surgery      H/O shoulder surgery          Home Medications:  levothyroxine 75 mcg (0.075 mg) oral tablet: 1 tab(s) orally once a day (17 Oct 2024 17:16)  predniSONE 10 mg oral tablet: 1 tab(s) orally once a day (13 Oct 2024 06:05)  Stiolto Respimat 10 ACT 2.5 mcg-2.5 mcg/inh inhalation aerosol: 2 puff(s) inhaled once a day (13 Oct 2024 06:05)      MEDICATIONS  (STANDING):  acetaminophen   IVPB .. 1000 milliGRAM(s) IV Intermittent once  ampicillin/sulbactam  IVPB 3 Gram(s) IV Intermittent every 6 hours  chlorhexidine 4% Liquid 1 Application(s) Topical <User Schedule>  DULoxetine 60 milliGRAM(s) Oral daily  fluticasone propionate/ salmeterol 250-50 MICROgram(s) Diskus 1 Dose(s) Inhalation two times a day  influenza   Vaccine 0.5 milliLiter(s) IntraMuscular once  levothyroxine 75 MICROGram(s) Oral daily  midodrine. 5 milliGRAM(s) Oral three times a day  polyethylene glycol 3350 17 Gram(s) Oral daily  predniSONE   Tablet 10 milliGRAM(s) Oral daily  senna 2 Tablet(s) Oral at bedtime  sodium chloride 0.9%. 1000 milliLiter(s) (100 mL/Hr) IV Continuous <Continuous>  tiotropium 2.5 MICROgram(s) Inhaler 2 Puff(s) Inhalation daily  traZODone 100 milliGRAM(s) Oral at bedtime  vancomycin  IVPB 750 milliGRAM(s) IV Intermittent every 12 hours    MEDICATIONS  (PRN):  acetaminophen     Tablet .. 650 milliGRAM(s) Oral every 6 hours PRN Temp greater or equal to 38C (100.4F), Mild Pain (1 - 3)  albuterol    90 MICROgram(s) HFA Inhaler 2 Puff(s) Inhalation every 6 hours PRN Bronchospasm  bisacodyl 5 milliGRAM(s) Oral every 12 hours PRN Constipation  ondansetron Injectable 4 milliGRAM(s) IV Push every 6 hours PRN Nausea and/or Vomiting  oxyCODONE    IR 5 milliGRAM(s) Oral every 4 hours PRN Moderate Pain (4 - 6)      Allergies    No Known Allergies    Intolerances        SOCIAL HISTORY: Denies tobacco, etoh abuse or illicit drug use    FAMILY HISTORY:  FH: lung cancer (Mother)    FH: brain cancer (Mother)    FH: Alzheimers disease (Father)        Vital Signs Last 24 Hrs  T(C): 37 (23 Oct 2024 15:23), Max: 37 (23 Oct 2024 15:23)  T(F): 98.6 (23 Oct 2024 15:23), Max: 98.6 (23 Oct 2024 15:23)  HR: 63 (23 Oct 2024 18:01) (56 - 79)  BP: 93/67 (23 Oct 2024 18:01) (87/59 - 102/75)  BP(mean): --  RR: 18 (23 Oct 2024 15:23) (18 - 18)  SpO2: 96% (23 Oct 2024 15:23) (91% - 98%)    Parameters below as of 23 Oct 2024 15:23  Patient On (Oxygen Delivery Method): nasal cannula  O2 Flow (L/min): 2          REVIEW OF SYSTEMS:    CONSTITUTIONAL:  As per HPI.  HEENT:  Eyes:  No diplopia or blurred vision. ENT:  No earache, sore throat or runny nose.  CARDIOVASCULAR:  No pressure, squeezing, tightness, heaviness or aching about the chest, neck, axilla or epigastrium.  RESPIRATORY:  No cough, shortness of breath, PND or orthopnea.  GASTROINTESTINAL:  No nausea, vomiting or diarrhea.  GENITOURINARY:  No dysuria, frequency or urgency.  MUSCULOSKELETAL:  As per HPI.  SKIN:  No change in skin, hair or nails.  NEUROLOGIC:  No paresthesias, fasciculations, seizures or weakness.  PSYCHIATRIC:  No disorder of thought or mood.  ENDOCRINE:  No heat or cold intolerance, polyuria or polydipsia.  HEMATOLOGICAL:  No easy bruising or bleedings:  .     PHYSICAL EXAMINATION:    GENERAL APPEARANCE:  Pt. is not currently dyspneic, in no distress. Pt. is alert, oriented, and pleasant.  HEENT:  Pupils are normal and react normally. No icterus. Mucous membranes well colored.  NECK:  Supple. No lymphadenopathy. Jugular venous pressure not elevated. Carotids equal.   HEART:   The cardiac impulse has a normal quality. Regular. Normal S1 and S2. There are no murmurs, rubs or gallops noted  CHEST:  Chest is clear to auscultation. Normal respiratory effort.  ABDOMEN:  Soft and nontender.   EXTREMITIES:  There is no cyanosis, clubbing or edema.   SKIN:  No rash or significant lesions are noted.    LABS:                        8.6    29.14 )-----------( 352      ( 23 Oct 2024 06:33 )             26.9     10-23    141  |  109[H]  |  15  ----------------------------<  89  4.2   |  30  |  0.82    Ca    8.9      23 Oct 2024 06:33              Urinalysis Basic - ( 23 Oct 2024 06:33 )    Color: x / Appearance: x / SG: x / pH: x  Gluc: 89 mg/dL / Ketone: x  / Bili: x / Urobili: x   Blood: x / Protein: x / Nitrite: x   Leuk Esterase: x / RBC: x / WBC x   Sq Epi: x / Non Sq Epi: x / Bacteria: x            RADIOLOGY & ADDITIONAL STUDIES:     CT Angio Chest PE Protocol w/ IV Cont (10.23.24 @ 14:36) >  CT angiogram of the chest was obtained following administration of   intravenous contrast. Approximately 70 cc of Omnipaque 350 was   administered. Coronal, sagittal and MIP images were submitted for review.    No hilar or mediastinal adenopathy is noted.    Heart is normal in size. Calcification of the coronary arteries noted. No   pericardial effusion is noted. Pulmonary arteries are normal in caliber.   No filling defects are noted.    No endobronchial lesion noted. Emphysematous changes are present in both   lungs. Compressive atelectasis is noted involving portions of both lower   lobes. This is secondary to small bilateral pleural effusions.    Below the diaphragm, visualized portions of the abdomen demonstrated   ascites.    Degenerative changes of the spine are noted.    IMPRESSION: No pulmonary embolus noted.    Small bilateral pleural effusions.

## 2024-10-23 NOTE — CONSULT NOTE ADULT - REASON FOR ADMISSION
scrotal pain
Scrotal Pain / Swelling / Infection
scrotal pain

## 2024-10-23 NOTE — PROGRESS NOTE ADULT - SUBJECTIVE AND OBJECTIVE BOX
HOSPITALIST ATTENDING PROGRESS NOTE    Chart and meds reviewed.  Patient seen and examined.    CC: Scrotal abscess    Subjective: No acute issues overnight, some AMS this am but returned to normal. States he has been   having this AMS for years.     All other systems reviewed and found to be negative with the exception of what has been described above.    MEDICATIONS  (STANDING):  ampicillin/sulbactam  IVPB 3 Gram(s) IV Intermittent every 6 hours  chlorhexidine 4% Liquid 1 Application(s) Topical <User Schedule>  DULoxetine 60 milliGRAM(s) Oral daily  influenza   Vaccine 0.5 milliLiter(s) IntraMuscular once  levothyroxine 75 MICROGram(s) Oral daily  midodrine. 5 milliGRAM(s) Oral three times a day  polyethylene glycol 3350 17 Gram(s) Oral daily  predniSONE   Tablet 10 milliGRAM(s) Oral daily  senna 2 Tablet(s) Oral at bedtime  sodium chloride 0.9%. 1000 milliLiter(s) (100 mL/Hr) IV Continuous <Continuous>  tiotropium 2.5 MICROgram(s)/olodaterol 2.5 MICROgram(s) (STIOLTO) Inhaler 2 Puff(s) Inhalation daily  traZODone 100 milliGRAM(s) Oral at bedtime    MEDICATIONS  (PRN):  acetaminophen     Tablet .. 650 milliGRAM(s) Oral every 6 hours PRN Temp greater or equal to 38C (100.4F), Mild Pain (1 - 3)  albuterol    90 MICROgram(s) HFA Inhaler 2 Puff(s) Inhalation every 6 hours PRN Bronchospasm  bisacodyl 5 milliGRAM(s) Oral every 12 hours PRN Constipation  ondansetron Injectable 4 milliGRAM(s) IV Push every 6 hours PRN Nausea and/or Vomiting  oxyCODONE    IR 5 milliGRAM(s) Oral every 4 hours PRN Moderate Pain (4 - 6)    Vital Signs Last 24 Hrs  T(C): 36.6 (23 Oct 2024 09:36), Max: 36.6 (23 Oct 2024 09:36)  T(F): 97.9 (23 Oct 2024 09:36), Max: 97.9 (23 Oct 2024 09:36)  HR: 56 (23 Oct 2024 09:36) (56 - 72)  BP: 89/63 (23 Oct 2024 09:36) (87/59 - 102/75)  RR: 18 (23 Oct 2024 09:36) (17 - 18)  SpO2: 91% (23 Oct 2024 09:36) (91% - 98%)    Parameters below as of 23 Oct 2024 09:36  Patient On (Oxygen Delivery Method): nasal cannula  O2 Flow (L/min): 4    GEN: NAD  HEENT:  pupils equal and reactive, EOMI, no oropharyngeal lesions, erythema, exudates, oral thrush  NECK:   supple, no carotid bruits  CV:  +S1, +S2, regular, no murmurs  RESP:   lungs clear to auscultation bilaterally, no wheezing, rales, rhonchi, good air entry bilaterally  GI:  abdomen soft, non-tender, non-distended, normal BS  EXT:  no clubbing, no cyanosis, no edema, no calf pain, swelling or erythema  NEURO:  AAOX3, no focal neurological deficits, follows all commands, able to move extremities spontaneously  SKIN:  no ulcers, lesions or rashes  : Scrotal dressing  CDI    LABS:                          8.6    29.14 )-----------( 352      ( 23 Oct 2024 06:33 )             26.9     10-23    141  |  109[H]  |  15  ----------------------------<  89  4.2   |  30  |  0.82    Ca    8.9      23 Oct 2024 06:33      Urinalysis Basic - ( 23 Oct 2024 06:33 )  Color: x / Appearance: x / SG: x / pH: x  Gluc: 89 mg/dL / Ketone: x  / Bili: x / Urobili: x   Blood: x / Protein: x / Nitrite: x   Leuk Esterase: x / RBC: x / WBC x   Sq Epi: x / Non Sq Epi: x / Bacteria: x       HOSPITALIST ATTENDING PROGRESS NOTE    Chart and meds reviewed.  Patient seen and examined.    CC: Scrotal abscess    Subjective: No acute issues overnight, some AMS this am but returned to normal. States he has been   having this AMS for years.     All other systems reviewed and found to be negative with the exception of what has been described above.    MEDICATIONS  (STANDING):  ampicillin/sulbactam  IVPB 3 Gram(s) IV Intermittent every 6 hours  chlorhexidine 4% Liquid 1 Application(s) Topical <User Schedule>  DULoxetine 60 milliGRAM(s) Oral daily  influenza   Vaccine 0.5 milliLiter(s) IntraMuscular once  levothyroxine 75 MICROGram(s) Oral daily  midodrine. 5 milliGRAM(s) Oral three times a day  polyethylene glycol 3350 17 Gram(s) Oral daily  predniSONE   Tablet 10 milliGRAM(s) Oral daily  senna 2 Tablet(s) Oral at bedtime  sodium chloride 0.9%. 1000 milliLiter(s) (100 mL/Hr) IV Continuous <Continuous>  tiotropium 2.5 MICROgram(s)/olodaterol 2.5 MICROgram(s) (STIOLTO) Inhaler 2 Puff(s) Inhalation daily  traZODone 100 milliGRAM(s) Oral at bedtime    MEDICATIONS  (PRN):  acetaminophen     Tablet .. 650 milliGRAM(s) Oral every 6 hours PRN Temp greater or equal to 38C (100.4F), Mild Pain (1 - 3)  albuterol    90 MICROgram(s) HFA Inhaler 2 Puff(s) Inhalation every 6 hours PRN Bronchospasm  bisacodyl 5 milliGRAM(s) Oral every 12 hours PRN Constipation  ondansetron Injectable 4 milliGRAM(s) IV Push every 6 hours PRN Nausea and/or Vomiting  oxyCODONE    IR 5 milliGRAM(s) Oral every 4 hours PRN Moderate Pain (4 - 6)    Vital Signs Last 24 Hrs  T(C): 36.6 (23 Oct 2024 09:36), Max: 36.6 (23 Oct 2024 09:36)  T(F): 97.9 (23 Oct 2024 09:36), Max: 97.9 (23 Oct 2024 09:36)  HR: 56 (23 Oct 2024 09:36) (56 - 72)  BP: 89/63 (23 Oct 2024 09:36) (87/59 - 102/75)  RR: 18 (23 Oct 2024 09:36) (17 - 18)  SpO2: 91% (23 Oct 2024 09:36) (91% - 98%)    Parameters below as of 23 Oct 2024 09:36  Patient On (Oxygen Delivery Method): nasal cannula  O2 Flow (L/min): 4    GEN: NAD  HEENT:  pupils equal and reactive, EOMI, no oropharyngeal lesions, erythema, exudates, oral thrush  NECK:   supple, no carotid bruits  CV:  +S1, +S2, regular, no murmurs  RESP: Bilateral ronchi  GI:  abdomen soft, non-tender, non-distended, normal BS  EXT:  no clubbing, no cyanosis, no edema, no calf pain, swelling or erythema  NEURO:  AAOX3, no focal neurological deficits, follows all commands, able to move extremities spontaneously  SKIN:  no ulcers, lesions or rashes  : Scrotal dressing  CDI    LABS:                          8.6    29.14 )-----------( 352      ( 23 Oct 2024 06:33 )             26.9     10-23    141  |  109[H]  |  15  ----------------------------<  89  4.2   |  30  |  0.82    Ca    8.9      23 Oct 2024 06:33      Urinalysis Basic - ( 23 Oct 2024 06:33 )  Color: x / Appearance: x / SG: x / pH: x  Gluc: 89 mg/dL / Ketone: x  / Bili: x / Urobili: x   Blood: x / Protein: x / Nitrite: x   Leuk Esterase: x / RBC: x / WBC x   Sq Epi: x / Non Sq Epi: x / Bacteria: x

## 2024-10-23 NOTE — CONSULT NOTE ADULT - ASSESSMENT
63 y/o M w/ PMH of COPD, CLL, lyme disease, hypothyoridism, cardiac arrest, p/w scrotal pain. Patient states that five days ago a dog jumped on him and cause a small puncture wound to his scrotum. Initially, it was only a small red leslie, but then it started to progress over time and increased in swelling and pain. Patient denies any fever or chills. Patient also, complains about intermittent abdominal discomfort, but is vague about the symptoms. Debra, nausea, vomiting, chest pain, shortness of breath, cough, runny nose, sore throat. As per ED physician, patient was seen by the surgery pa covering for urology team in the emergency room    PROBLEMS:    Scrotal abscess-S/p I&D of scrotal abscess on 10/13. Abscess fluid cx MRSA+. Patient taken back to the OR on 10/18 for I&D. F/U intra op cultures from 10/18 with NGTD. On 10/21 patient noted to have induration and edema tracking up the right inguinal area, repeat I&D on 10/21  COPD/Hypoxia due to emphsema & b/l lower lobe atelectasis R>L  Compressive atelectasis is noted involving portions of both lower lobes. This is secondary to small bilateral pleural effusions  Hypothyroid  Anemia  Depression  Insomnia     PLAN:    Continue IV abx (Unasyn/Vanco)  Spiriva/ advair  Prednisone 10 daily  Incentive spirometry  DVT ppx-SCDs   65 y/o M w/ PMH of COPD, CLL, lyme disease, hypothyoridism, cardiac arrest, p/w scrotal pain. Patient states that five days ago a dog jumped on him and cause a small puncture wound to his scrotum. Initially, it was only a small red leslie, but then it started to progress over time and increased in swelling and pain. Patient denies any fever or chills. Patient also, complains about intermittent abdominal discomfort, but is vague about the symptoms. Debra, nausea, vomiting, chest pain, shortness of breath, cough, runny nose, sore throat. As per ED physician, patient was seen by the surgery pa covering for urology team in the emergency room    PROBLEMS:    Scrotal abscess-S/p I&D of scrotal abscess on 10/13. Abscess fluid cx MRSA+. Patient taken back to the OR on 10/18 for I&D. F/U intra op cultures from 10/18 with NGTD. On 10/21 patient noted to have induration and edema tracking up the right inguinal area, repeat I&D on 10/21  COPD/Hypoxia due to emphsema & b/l lower lobe atelectasis R>L  Compressive atelectasis is noted involving portions of both lower lobes. This is secondary to small bilateral pleural effusions  Hypothyroid  Anemia  Depression  Insomnia     PLAN:    Titrate fio2  Continue IV abx (Unasyn/Vanco)  Spiriva/ advair  Prednisone 10 daily  Incentive spirometry/OOB/chest PT  D/w staff  DVT ppx-SCDs

## 2024-10-23 NOTE — PROGRESS NOTE ADULT - ASSESSMENT
65 y/o M w/ PMH of COPD, CLL, lyme disease, hypothyoridism, cardiac arrest, p/w scrotal pain. Patient states that five days ago a dog jumped on him and cause a small puncture wound to his scrotum. Initially, it was only a small red leslie, but then it started to progress over time and increased in swelling and pain.  Patient also, complains about intermittent abdominal discomfort, but is vague about the symptoms. Denies, nausea, vomiting, chest pain, shortness of breath, cough, runny nose, sore throat. As per ED physician, patient was seen by the surgery p a covering for urology team in the emergency room. Wbc ct 29 UA positive, imaging shows Large multilocular tubular rim-enhancing fluid collection in the right hemiscrotum measuring up to 3 cm in diameter with pigtail-like extension extending along the right spermatic cord. Collection extends to the skin surface at the level of the base of the penis. Scrotal wall edema. Started on IV abx, urology consulted.     1. Severe scrotal abscess/cellulitis s/p debridement. Pyuria. Complicated UTI. CLL. Leukocytosis. s/p Dog bite  - imaging reviewed repeat CT noted  - s/p I & D and repeat debridement 10/18  - s/p debridement 10/22 noted with hematoma, evacuated   - urology f/u noted  - s/p IV zosyn [id number]  #5, on unasyn [id number] #5-6  - on vancomycin  [id number] #11 trough therapeutic  - continue with antibiotic coverage   - f/u urine cx blood cx no growth, abscess cx growing MRSA, body fluid cx 10/18 no growth so far   - tolerating abx well so far; no side effects noted  - reason for abx use and side effects reviewed with patient  - supportive care  - fu cbc    Clinical team may change from intravenous to oral antibiotics when the following criteria are met:   1. Patient is clinically improving/stable       a)	Improved signs and symptoms of infection from initial presentation       b)	Afebrile for 24 hours       c)	Leukocytosis trending towards normal range   2. Patient is tolerating oral intake   3. Initial/repeat blood cultures are negative     when above met change to po bactrim 1 DS BID x 14-21 day course

## 2024-10-23 NOTE — PROGRESS NOTE ADULT - SUBJECTIVE AND OBJECTIVE BOX
Pt seen and examined without complaints. Pain is controlled. Vijaya diet, no other complaints.  Denies SOB/CP/N/V.     Vital Signs Last 24 Hrs  T(C): 36.6 (23 Oct 2024 09:36), Max: 36.6 (23 Oct 2024 09:36)  T(F): 97.9 (23 Oct 2024 09:36), Max: 97.9 (23 Oct 2024 09:36)  HR: 56 (23 Oct 2024 09:36) (56 - 72)  BP: 89/63 (23 Oct 2024 09:36) (87/59 - 102/75)  BP(mean): --  RR: 18 (23 Oct 2024 09:36) (17 - 18)  SpO2: 91% (23 Oct 2024 09:36) (91% - 98%)    Parameters below as of 23 Oct 2024 09:36  Patient On (Oxygen Delivery Method): nasal cannula  O2 Flow (L/min): 4      I&O's Summary    22 Oct 2024 07:01  -  23 Oct 2024 07:00  --------------------------------------------------------  IN: 0 mL / OUT: 1200 mL / NET: -1200 mL        Physical Exam  Gen: NAD, A&Ox3  Abd: Soft, NT, ND, no bladder palp  Back:   : re-packed right inguinal /scrotal area with wet to dry dressing and covered with abdominal derssing                        8.6    29.14 )-----------( 352      ( 23 Oct 2024 06:33 )             26.9       10-23    141  |  109[H]  |  15  ----------------------------<  89  4.2   |  30  |  0.82    Ca    8.9      23 Oct 2024 06:33

## 2024-10-23 NOTE — PROGRESS NOTE ADULT - SUBJECTIVE AND OBJECTIVE BOX
Date of service: 10-23-24 @ 11:04    pt seen and examined  s/p I & D of scrotal abscess 10/13  s/p further debridement 10/18 and 10/22  afebrile    ROS: no fever or chills; denies dizziness, no HA, no SOB or cough, no abdominal pain, no diarrhea or constipation; no legs pain, no rashes    MEDICATIONS  (STANDING):  ampicillin/sulbactam  IVPB 3 Gram(s) IV Intermittent every 6 hours  chlorhexidine 4% Liquid 1 Application(s) Topical <User Schedule>  DULoxetine 60 milliGRAM(s) Oral daily  influenza   Vaccine 0.5 milliLiter(s) IntraMuscular once  levothyroxine 75 MICROGram(s) Oral daily  midodrine. 5 milliGRAM(s) Oral three times a day  polyethylene glycol 3350 17 Gram(s) Oral daily  predniSONE   Tablet 10 milliGRAM(s) Oral daily  senna 2 Tablet(s) Oral at bedtime  sodium chloride 0.9%. 1000 milliLiter(s) (100 mL/Hr) IV Continuous <Continuous>  tiotropium 2.5 MICROgram(s)/olodaterol 2.5 MICROgram(s) (STIOLTO) Inhaler 2 Puff(s) Inhalation daily  traZODone 100 milliGRAM(s) Oral at bedtime    Vital Signs Last 24 Hrs  T(C): 36.6 (23 Oct 2024 09:36), Max: 36.6 (23 Oct 2024 09:36)  T(F): 97.9 (23 Oct 2024 09:36), Max: 97.9 (23 Oct 2024 09:36)  HR: 56 (23 Oct 2024 09:36) (56 - 72)  BP: 89/63 (23 Oct 2024 09:36) (87/59 - 102/75)  BP(mean): --  RR: 18 (23 Oct 2024 09:36) (17 - 18)  SpO2: 91% (23 Oct 2024 09:36) (91% - 98%)    Parameters below as of 23 Oct 2024 09:36  Patient On (Oxygen Delivery Method): nasal cannula  O2 Flow (L/min): 4      PE:  Constitutional: NAD  HEENT: NC/AT, EOMI, PERRLA, conjunctivae clear; ears and nose atraumatic; pharynx benign  Neck: supple; thyroid not palpable  Back: no tenderness  Respiratory: respiratory effort normal; clear to auscultation  Cardiovascular: S1S2 regular, no murmurs  Abdomen: soft, not tender, not distended, positive BS; liver and spleen WNL  Genitourinary: no suprapubic tenderness; scrotal edema tenderness packing in place   Lymphatic: no LN palpable  Musculoskeletal: no muscle tenderness, no joint swelling or tenderness  Extremities: no pedal edema  Neurological/ Psychiatric: AxOx3, Judgement and insight normal;  moving all extremities  Skin: no rashes; no palpable lesions    Labs: all available labs reviewed                                                       8.6    29.14 )-----------( 352      ( 23 Oct 2024 06:33 )             26.9     10-23    141  |  109[H]  |  15  ----------------------------<  89  4.2   |  30  |  0.82    Ca    8.9      23 Oct 2024 06:33        Vancomycin Level, Trough: 15.2 ug/mL (10-17 @ 23:49)    Culture - Abscess with Gram Stain (10.13.24 @ 14:35)   Gram Stain:   No polymorphonuclear cells seen per low power field   MRSA  Urinalysis with Rflx   Culture (10.13.24 @ 05:05)   Urine Appearance: Clear  Color: Yellow  Specific Gravity: >1.030  pH Urine: 6.5  Protein, Urine: Negative mg/dL  Glucose Qualitative, Urine: Negative mg/dL  Ketone - Urine: Negative mg/dL  Blood, Urine: Negative  Bilirubin: Negative  Urobilinogen: 0.2 mg/dL  Leukocyte Esterase Concentration: Negative  Nitrite: Negative  Culture - Body Fluid with Gram Stain (10.18.24 @ 18:08)   Gram Stain:   No polymorphonuclear cells seen seen per low power field   No organisms seen per oil power field  Specimen Source: Body Fluid  Culture Results:   No growth  Radiology: all available radiological tests reviewed    IMPRESSION:  Diffuse mild distention of small bowel loops, decreased in caliber   compared with recent pelvic CT 10/13/2024 and without transition point to   suggest bowel obstruction, likely representing a mild evolving ileus.    Small bilateral pleural effusions. Complete atelectasis of the visualized   left lower lobe.    Status post incision and drainage of the previously seen right scrotal   abscess.      IMPRESSION:  Partially visualized moderate sized complex collection in the right   scrotal wall, better depicted on the CT of the pelvis from earlier the   same day, compatible with an abscess.        IMPRESSION:  Large multilocular tubular rim-enhancing fluid collection in the right   hemiscrotum measuring up to 3 cm in diameter with pigtail-like extension   extending along the right spermatic cord. Collection extends to the skin   surface at the level of the base of the penis. Scrotal wall edema.   Constellation of findings and clinical history are most compatible with   an abscess.    Nonspecific mildly dilated fluid-filled small bowel loops in the right   lower abdomen.    Advanced directives addressed: full resuscitation

## 2024-10-24 ENCOUNTER — TRANSCRIPTION ENCOUNTER (OUTPATIENT)
Age: 64
End: 2024-10-24

## 2024-10-24 VITALS
HEART RATE: 70 BPM | DIASTOLIC BLOOD PRESSURE: 86 MMHG | SYSTOLIC BLOOD PRESSURE: 113 MMHG | RESPIRATION RATE: 18 BRPM | OXYGEN SATURATION: 93 %

## 2024-10-24 LAB
ANION GAP SERPL CALC-SCNC: 4 MMOL/L — LOW (ref 5–17)
BUN SERPL-MCNC: 15 MG/DL — SIGNIFICANT CHANGE UP (ref 7–23)
CALCIUM SERPL-MCNC: 9 MG/DL — SIGNIFICANT CHANGE UP (ref 8.5–10.1)
CHLORIDE SERPL-SCNC: 107 MMOL/L — SIGNIFICANT CHANGE UP (ref 96–108)
CO2 SERPL-SCNC: 30 MMOL/L — SIGNIFICANT CHANGE UP (ref 22–31)
CREAT SERPL-MCNC: 0.81 MG/DL — SIGNIFICANT CHANGE UP (ref 0.5–1.3)
EGFR: 99 ML/MIN/1.73M2 — SIGNIFICANT CHANGE UP
GLUCOSE SERPL-MCNC: 88 MG/DL — SIGNIFICANT CHANGE UP (ref 70–99)
HCT VFR BLD CALC: 27.3 % — LOW (ref 39–50)
HGB BLD-MCNC: 8.8 G/DL — LOW (ref 13–17)
MCHC RBC-ENTMCNC: 32.2 GM/DL — SIGNIFICANT CHANGE UP (ref 32–36)
MCHC RBC-ENTMCNC: 36.7 PG — HIGH (ref 27–34)
MCV RBC AUTO: 113.8 FL — HIGH (ref 80–100)
PLATELET # BLD AUTO: 387 K/UL — SIGNIFICANT CHANGE UP (ref 150–400)
POTASSIUM SERPL-MCNC: 4.2 MMOL/L — SIGNIFICANT CHANGE UP (ref 3.5–5.3)
POTASSIUM SERPL-SCNC: 4.2 MMOL/L — SIGNIFICANT CHANGE UP (ref 3.5–5.3)
RBC # BLD: 2.4 M/UL — LOW (ref 4.2–5.8)
RBC # FLD: 14 % — SIGNIFICANT CHANGE UP (ref 10.3–14.5)
SODIUM SERPL-SCNC: 141 MMOL/L — SIGNIFICANT CHANGE UP (ref 135–145)
WBC # BLD: 33.96 K/UL — HIGH (ref 3.8–10.5)
WBC # FLD AUTO: 33.96 K/UL — HIGH (ref 3.8–10.5)

## 2024-10-24 PROCEDURE — 99232 SBSQ HOSP IP/OBS MODERATE 35: CPT

## 2024-10-24 PROCEDURE — 99239 HOSP IP/OBS DSCHRG MGMT >30: CPT

## 2024-10-24 RX ORDER — SULFAMETHOXAZOLE/TRIMETHOPRIM 800-160 MG
1 TABLET ORAL
Qty: 0 | Refills: 0 | DISCHARGE
Start: 2024-10-24

## 2024-10-24 RX ORDER — LEVOTHYROXINE SODIUM 88 MCG
1 TABLET ORAL
Qty: 0 | Refills: 0 | DISCHARGE
Start: 2024-10-24

## 2024-10-24 RX ORDER — MIDODRINE HYDROCHLORIDE 2.5 MG/1
1 TABLET ORAL
Qty: 0 | Refills: 0 | DISCHARGE
Start: 2024-10-24

## 2024-10-24 RX ORDER — SULFAMETHOXAZOLE/TRIMETHOPRIM 800-160 MG
1 TABLET ORAL
Refills: 0 | Status: DISCONTINUED | OUTPATIENT
Start: 2024-10-24 | End: 2024-10-24

## 2024-10-24 RX ORDER — IMMUNE GLOBULIN (HUMAN) 10 G/100ML
30 INJECTION INTRAVENOUS; SUBCUTANEOUS ONCE
Refills: 0 | Status: COMPLETED | OUTPATIENT
Start: 2024-10-24 | End: 2024-10-24

## 2024-10-24 RX ORDER — LEVOTHYROXINE SODIUM 88 MCG
125 TABLET ORAL DAILY
Refills: 0 | Status: DISCONTINUED | OUTPATIENT
Start: 2024-10-24 | End: 2024-10-24

## 2024-10-24 RX ORDER — SENNA 187 MG
2 TABLET ORAL
Qty: 0 | Refills: 0 | DISCHARGE
Start: 2024-10-24

## 2024-10-24 RX ORDER — DIPHENHYDRAMINE HCL 12.5MG/5ML
50 ELIXIR ORAL ONCE
Refills: 0 | Status: COMPLETED | OUTPATIENT
Start: 2024-10-24 | End: 2024-10-24

## 2024-10-24 RX ORDER — ACETAMINOPHEN 500 MG
650 TABLET ORAL ONCE
Refills: 0 | Status: COMPLETED | OUTPATIENT
Start: 2024-10-24 | End: 2024-10-24

## 2024-10-24 RX ORDER — TIOTROPIUM BROMIDE AND OLODATEROL 3.124; 2.736 UG/1; UG/1
2 SPRAY, METERED RESPIRATORY (INHALATION) DAILY
Refills: 0 | Status: DISCONTINUED | OUTPATIENT
Start: 2024-10-24 | End: 2024-10-24

## 2024-10-24 RX ORDER — OXYCODONE HYDROCHLORIDE 30 MG/1
1 TABLET ORAL
Qty: 0 | Refills: 0 | DISCHARGE
Start: 2024-10-24

## 2024-10-24 RX ADMIN — OXYCODONE HYDROCHLORIDE 5 MILLIGRAM(S): 30 TABLET ORAL at 10:34

## 2024-10-24 RX ADMIN — IMMUNE GLOBULIN (HUMAN) 100 GRAM(S): 10 INJECTION INTRAVENOUS; SUBCUTANEOUS at 13:32

## 2024-10-24 RX ADMIN — OXYCODONE HYDROCHLORIDE 5 MILLIGRAM(S): 30 TABLET ORAL at 15:15

## 2024-10-24 RX ADMIN — MIDODRINE HYDROCHLORIDE 5 MILLIGRAM(S): 2.5 TABLET ORAL at 06:32

## 2024-10-24 RX ADMIN — MIDODRINE HYDROCHLORIDE 5 MILLIGRAM(S): 2.5 TABLET ORAL at 12:58

## 2024-10-24 RX ADMIN — CHLORHEXIDINE GLUCONATE 1 APPLICATION(S): 40 SOLUTION TOPICAL at 13:24

## 2024-10-24 RX ADMIN — Medication 650 MILLIGRAM(S): at 12:58

## 2024-10-24 RX ADMIN — TIOTROPIUM BROMIDE AND OLODATEROL 2 PUFF(S): 3.124; 2.736 SPRAY, METERED RESPIRATORY (INHALATION) at 09:36

## 2024-10-24 RX ADMIN — AMPICILLIN AND SULBACTAM 200 GRAM(S): 2; 1 INJECTION, POWDER, FOR SOLUTION INTRAMUSCULAR; INTRAVENOUS at 02:15

## 2024-10-24 RX ADMIN — OXYCODONE HYDROCHLORIDE 5 MILLIGRAM(S): 30 TABLET ORAL at 11:04

## 2024-10-24 RX ADMIN — Medication 75 MICROGRAM(S): at 06:33

## 2024-10-24 RX ADMIN — Medication 50 MILLIGRAM(S): at 12:58

## 2024-10-24 RX ADMIN — OXYCODONE HYDROCHLORIDE 5 MILLIGRAM(S): 30 TABLET ORAL at 15:45

## 2024-10-24 RX ADMIN — Medication 2 PUFF(S): at 08:42

## 2024-10-24 RX ADMIN — AMPICILLIN AND SULBACTAM 200 GRAM(S): 2; 1 INJECTION, POWDER, FOR SOLUTION INTRAMUSCULAR; INTRAVENOUS at 09:42

## 2024-10-24 RX ADMIN — POLYETHYLENE GLYCOL 3350 17 GRAM(S): 17 POWDER, FOR SOLUTION ORAL at 10:03

## 2024-10-24 RX ADMIN — DULOXETINE HYDROCHLORIDE 60 MILLIGRAM(S): 30 CAPSULE, DELAYED RELEASE ORAL at 10:03

## 2024-10-24 RX ADMIN — MIDODRINE HYDROCHLORIDE 5 MILLIGRAM(S): 2.5 TABLET ORAL at 18:40

## 2024-10-24 RX ADMIN — Medication 650 MILLIGRAM(S): at 14:04

## 2024-10-24 RX ADMIN — Medication 10 MILLIGRAM(S): at 10:04

## 2024-10-24 NOTE — PROGRESS NOTE ADULT - SUBJECTIVE AND OBJECTIVE BOX
Date of service: 10-24-24 @ 10:36    pt seen and examined  s/p I & D of scrotal abscess 10/13  s/p further debridement 10/18 and 10/22  afebrile  pain controlled    ROS: no fever or chills; denies dizziness, no HA, no SOB or cough, no abdominal pain, no diarrhea or constipation; no legs pain, no rashes      MEDICATIONS  (STANDING):  acetaminophen   IVPB .. 1000 milliGRAM(s) IV Intermittent once  chlorhexidine 4% Liquid 1 Application(s) Topical <User Schedule>  DULoxetine 60 milliGRAM(s) Oral daily  influenza   Vaccine 0.5 milliLiter(s) IntraMuscular once  levothyroxine 75 MICROGram(s) Oral daily  midodrine. 5 milliGRAM(s) Oral three times a day  polyethylene glycol 3350 17 Gram(s) Oral daily  predniSONE   Tablet 10 milliGRAM(s) Oral daily  senna 2 Tablet(s) Oral at bedtime  tiotropium 2.5 MICROgram(s)/olodaterol 2.5 MICROgram(s) (STIOLTO) Inhaler 2 Puff(s) Inhalation daily  traZODone 100 milliGRAM(s) Oral at bedtime  trimethoprim  160 mG/sulfamethoxazole 800 mG 1 Tablet(s) Oral two times a day    Vital Signs Last 24 Hrs  T(C): 36.8 (24 Oct 2024 09:15), Max: 37 (23 Oct 2024 15:23)  T(F): 98.3 (24 Oct 2024 09:15), Max: 98.6 (23 Oct 2024 15:23)  HR: 60 (24 Oct 2024 09:15) (60 - 79)  BP: 89/59 (24 Oct 2024 09:15) (88/48 - 98/68)  BP(mean): --  RR: 18 (24 Oct 2024 09:15) (18 - 18)  SpO2: 91% (24 Oct 2024 09:15) (91% - 96%)    Parameters below as of 24 Oct 2024 09:15  Patient On (Oxygen Delivery Method): nasal cannula  O2 Flow (L/min): 3      PE:  Constitutional: NAD  HEENT: NC/AT, EOMI, PERRLA, conjunctivae clear; ears and nose atraumatic; pharynx benign  Neck: supple; thyroid not palpable  Back: no tenderness  Respiratory: respiratory effort normal; clear to auscultation  Cardiovascular: S1S2 regular, no murmurs  Abdomen: soft, not tender, not distended, positive BS; liver and spleen WNL  Genitourinary: no suprapubic tenderness; scrotal edema tenderness packing in place   Lymphatic: no LN palpable  Musculoskeletal: no muscle tenderness, no joint swelling or tenderness  Extremities: no pedal edema  Neurological/ Psychiatric: AxOx3, Judgement and insight normal;  moving all extremities  Skin: no rashes; no palpable lesions    Labs: all available labs reviewed                                                                8.8    33.96 )-----------( 387      ( 24 Oct 2024 08:12 )             27.3     10-24    141  |  107  |  15  ----------------------------<  88  4.2   |  30  |  0.81    Ca    9.0      24 Oct 2024 08:12    Vancomycin Level, Trough: 15.2 ug/mL (10-17 @ 23:49)    Culture - Abscess with Gram Stain (10.13.24 @ 14:35)   Gram Stain:   No polymorphonuclear cells seen per low power field   MRSA  Urinalysis with Rflx   Culture (10.13.24 @ 05:05)   Urine Appearance: Clear  Color: Yellow  Specific Gravity: >1.030  pH Urine: 6.5  Protein, Urine: Negative mg/dL  Glucose Qualitative, Urine: Negative mg/dL  Ketone - Urine: Negative mg/dL  Blood, Urine: Negative  Bilirubin: Negative  Urobilinogen: 0.2 mg/dL  Leukocyte Esterase Concentration: Negative  Nitrite: Negative  Culture - Body Fluid with Gram Stain (10.18.24 @ 18:08)   Gram Stain:   No polymorphonuclear cells seen seen per low power field   No organisms seen per oil power field  Specimen Source: Body Fluid  Culture Results:   No growth  Radiology: all available radiological tests reviewed    IMPRESSION:  Diffuse mild distention of small bowel loops, decreased in caliber   compared with recent pelvic CT 10/13/2024 and without transition point to   suggest bowel obstruction, likely representing a mild evolving ileus.    Small bilateral pleural effusions. Complete atelectasis of the visualized   left lower lobe.    Status post incision and drainage of the previously seen right scrotal   abscess.      IMPRESSION:  Partially visualized moderate sized complex collection in the right   scrotal wall, better depicted on the CT of the pelvis from earlier the   same day, compatible with an abscess.        IMPRESSION:  Large multilocular tubular rim-enhancing fluid collection in the right   hemiscrotum measuring up to 3 cm in diameter with pigtail-like extension   extending along the right spermatic cord. Collection extends to the skin   surface at the level of the base of the penis. Scrotal wall edema.   Constellation of findings and clinical history are most compatible with   an abscess.    Nonspecific mildly dilated fluid-filled small bowel loops in the right   lower abdomen.    Advanced directives addressed: full resuscitation

## 2024-10-24 NOTE — PROGRESS NOTE ADULT - NS ATTEND AMEND GEN_ALL_CORE FT
This is a 64 year old male with a history of CLL stage 0, never required treatment.  Admitted with scrotal abscess, required multiple I&D, initially on 10/13, 10/18, then 10/21.  Cultures + MRSA, on IV vanco/unasyn.   Still with stage 0 disease, no LAD or SM on CT scan.   Anemia may be due to suppression from infection.    Recommend to check his quantitative immunoglobulins to see if he can benefit from IVIG.    Cont outpatient follow up with Dr. Castaneda after discharge. This is a 64 year old male with a history of CLL stage 0, never required treatment.  Admitted with scrotal abscess, required multiple I&D, initially on 10/13, 10/18, then 10/21.  Cultures + MRSA, on IV vanco/unasyn.   Still with stage 0 disease, no LAD or SM on CT scan.   Anemia may be due to suppression from infection- stable.    He has a hypogammaglobulinemia due to the CLL, would give 25-30gm IVIG to assist with infection/healing.   Continue antibiotics per ID.      Outpatient follow up with Dr. Castaneda after discharge.

## 2024-10-24 NOTE — PROGRESS NOTE ADULT - ASSESSMENT
65 yo male with scrotal abscess. POD 3 s/p second retake Incision and drainage of abscess of scrotal wall with reaccumulation of fluid in groin, with hematoma. evacuated, irrigated, and packed. inguinal incision placed to increase drainage.  Scrotal and right inguinal dressings changed wet to dry dressing applied. ABD pad placed.   Recommend  ABX per ID  Scrotal support  Patient will require wet to dry dressing changes once a day.     Case discussed with Dr. Caro

## 2024-10-24 NOTE — PROGRESS NOTE ADULT - ASSESSMENT
65 y/o M w/ PMH of COPD, CLL, lyme disease, hypothyoridism, cardiac arrest, p/w scrotal pain. Patient states that five days ago a dog jumped on him and cause a small puncture wound to his scrotum. Initially, it was only a small red leslie, but then it started to progress over time and increased in swelling and pain. Patient denies any fever or chills. Patient also, complains about intermittent abdominal discomfort, but is vague about the symptoms. Debra, nausea, vomiting, chest pain, shortness of breath, cough, runny nose, sore throat. As per ED physician, patient was seen by the surgery pa covering for urology team in the emergency room    PROBLEMS:    Scrotal abscess-S/p I&D of scrotal abscess on 10/13. Abscess fluid cx MRSA+. Patient taken back to the OR on 10/18 for I&D. F/U intra op cultures from 10/18 with NGTD. On 10/21 patient noted to have induration and edema tracking up the right inguinal area, repeat I&D on 10/21  COPD/Hypoxia due to emphsema & b/l lower lobe atelectasis R>L  Compressive atelectasis is noted involving portions of both lower lobes. This is secondary to small bilateral pleural effusions  Hypothyroid  Anemia  Depression  Insomnia     PLAN:    Pulmonary stable for rehab  Trend WBC  Titrate fio2  Continue IV abx (Unasyn/Vanco)  Spiriva/ advair  Prednisone 10 daily  Incentive spirometry/OOB/chest PT  D/w staff  DVT ppx-SCDs

## 2024-10-24 NOTE — PROGRESS NOTE ADULT - SUBJECTIVE AND OBJECTIVE BOX
Subjective:    pat better, 2-3 lpm nc sat 93-96%, pain in the scrotum also better control, lying in bed.    Home Medications:  levothyroxine 75 mcg (0.075 mg) oral tablet: 1 tab(s) orally once a day (17 Oct 2024 17:16)  predniSONE 10 mg oral tablet: 1 tab(s) orally once a day (13 Oct 2024 06:05)  Stiolto Respimat 10 ACT 2.5 mcg-2.5 mcg/inh inhalation aerosol: 2 puff(s) inhaled once a day (13 Oct 2024 06:05)    MEDICATIONS  (STANDING):  acetaminophen   IVPB .. 1000 milliGRAM(s) IV Intermittent once  chlorhexidine 4% Liquid 1 Application(s) Topical <User Schedule>  DULoxetine 60 milliGRAM(s) Oral daily  influenza   Vaccine 0.5 milliLiter(s) IntraMuscular once  levothyroxine 125 MICROGram(s) Oral daily  midodrine. 5 milliGRAM(s) Oral three times a day  predniSONE   Tablet 10 milliGRAM(s) Oral daily  senna 2 Tablet(s) Oral at bedtime  tiotropium 2.5 MICROgram(s)/olodaterol 2.5 MICROgram(s) (STIOLTO) Inhaler 2 Puff(s) Inhalation daily  traZODone 100 milliGRAM(s) Oral at bedtime  trimethoprim  160 mG/sulfamethoxazole 800 mG 1 Tablet(s) Oral two times a day    MEDICATIONS  (PRN):  acetaminophen     Tablet .. 650 milliGRAM(s) Oral every 6 hours PRN Temp greater or equal to 38C (100.4F), Mild Pain (1 - 3)  albuterol    90 MICROgram(s) HFA Inhaler 2 Puff(s) Inhalation every 6 hours PRN Bronchospasm  bisacodyl 5 milliGRAM(s) Oral every 12 hours PRN Constipation  ondansetron Injectable 4 milliGRAM(s) IV Push every 6 hours PRN Nausea and/or Vomiting  oxyCODONE    IR 5 milliGRAM(s) Oral every 4 hours PRN Moderate Pain (4 - 6)      Allergies    No Known Allergies    Intolerances        Vital Signs Last 24 Hrs  T(C): 36.8 (24 Oct 2024 09:15), Max: 37 (23 Oct 2024 15:23)  T(F): 98.3 (24 Oct 2024 09:15), Max: 98.6 (23 Oct 2024 15:23)  HR: 60 (24 Oct 2024 09:15) (60 - 79)  BP: 89/59 (24 Oct 2024 09:15) (88/48 - 98/68)  BP(mean): --  RR: 18 (24 Oct 2024 09:15) (18 - 18)  SpO2: 91% (24 Oct 2024 09:15) (91% - 96%)    Parameters below as of 24 Oct 2024 09:15  Patient On (Oxygen Delivery Method): nasal cannula  O2 Flow (L/min): 3        PHYSICAL EXAMINATION:    NECK:  Supple. No lymphadenopathy. Jugular venous pressure not elevated. Carotids equal.   HEART:   The cardiac impulse has a normal quality. Reg., Nl S1 and S2.  There are no murmurs, rubs or gallops noted  CHEST:  Chest crackles to auscultation. Normal respiratory effort.  ABDOMEN:  Soft and nontender.   EXTREMITIES:  There is no edema.       LABS:                        8.8    33.96 )-----------( 387      ( 24 Oct 2024 08:12 )             27.3     10-24    141  |  107  |  15  ----------------------------<  88  4.2   |  30  |  0.81    Ca    9.0      24 Oct 2024 08:12        Urinalysis Basic - ( 24 Oct 2024 08:12 )    Color: x / Appearance: x / SG: x / pH: x  Gluc: 88 mg/dL / Ketone: x  / Bili: x / Urobili: x   Blood: x / Protein: x / Nitrite: x   Leuk Esterase: x / RBC: x / WBC x   Sq Epi: x / Non Sq Epi: x / Bacteria: x

## 2024-10-24 NOTE — PROGRESS NOTE ADULT - ASSESSMENT
65 y/o M with known CLL from a few years ago, followed with Heme Onc Dr Castaneda but has not been seen in the office in 2-3 years currently admitted with scrotal swelling / infection      # Leukocytosis & Macrocytic Anemia with Known CLL in setting of Acute Infectious Process    - WBC count currently 33K  - Patient has known CLL from prior, has not followed up outpatient in a few years  - Baseline WBC count from 2022 seems to be about 15 - 18K  - Hgb currently ~9.0 g/dL   - MCV >110  - Repeat Flow Cyto noted monotypic B cells, kappa restricted, CD10 negative consistent with B cell lymphoproliferative disorder / lymphoma  - Anemia / hemolysis unremarkable, except low iron, but Ferritin high > three times the normal range  - Currently admitted with acute scrotal swelling / pain; Uro & Surgery following, s/p I & D on 10/13, 10/18 & 10/21 ---> likely contributory to elevated WBC and suppressed Hgb  - Current quantitative IgG <600  - As of 10/24; plan to give low dose IVIG to help with passive immunity / healing; pre meds ordered   - Patient admitted he would like to follow up with Dr Castaneda again as long as insurance allows  - Continue to trend serial CBCs while admitted  - WBC count should trend down with appropriate infection management / tx  - Continue supportive measures        Enrique Dozier PA-C  Hematology Oncology  [facility]  [phone]

## 2024-10-24 NOTE — DISCHARGE NOTE NURSING/CASE MANAGEMENT/SOCIAL WORK - PATIENT PORTAL LINK FT
You can access the FollowMyHealth Patient Portal offered by [facility] by registering at the following website: [email or web address]. By joining [facility]’s FollowMyHealth portal, you will also be able to view your health information using other applications (apps) compatible with our system.

## 2024-10-24 NOTE — PROGRESS NOTE ADULT - SUBJECTIVE AND OBJECTIVE BOX
Patient seen at bedside. Patient is POD 3 s/p Incision and drainage of abscess of scrotal wall with reaccumulation of fluid in groin, with hematoma. evacuated, irrigated, and packed. inguinal incision placed to increase drainage. Patient reports he feels much better this morning and no longer has lower abdominal or right inguinal pain. Denies fevers, chills.    PE  Vital Signs Last 24 Hrs  T(C): 36.8 (24 Oct 2024 09:15), Max: 37 (23 Oct 2024 15:23)  T(F): 98.3 (24 Oct 2024 09:15), Max: 98.6 (23 Oct 2024 15:23)  HR: 60 (24 Oct 2024 09:15) (60 - 79)  BP: 89/59 (24 Oct 2024 09:15) (88/48 - 98/68)  BP(mean): --  RR: 18 (24 Oct 2024 09:15) (18 - 18)  SpO2: 91% (24 Oct 2024 09:15) (91% - 96%)    Parameters below as of 24 Oct 2024 09:15  Patient On (Oxygen Delivery Method): nasal cannula  O2 Flow (L/min): 3                 Lung    : No resp distress  Abdo:   : Soft, Non tender, No guarding, No distension   Back    : No CVAT b/l  Extremity: No calf tenderness   Genitalia Male: No Tavarez, Scrotal wound clean/dry with wet to dry dressing, no purulence, no drainage. tunica vaginalis exposed but pink and clean. + right inguinal wound also with wet to dry dressing no discharge or active bleeding.  Neuro   : A&Ox3        LABS                             8.8    33.96 )-----------( 387      ( 24 Oct 2024 08:12 )             27.3   10-24    141  |  107  |  15  ----------------------------<  88  4.2   |  30  |  0.81    Ca    9.0      24 Oct 2024 08:12

## 2024-10-24 NOTE — DISCHARGE NOTE NURSING/CASE MANAGEMENT/SOCIAL WORK - NSDCPEFALRISK_GEN_ALL_CORE
For information on Fall & Injury Prevention, visit: [email or web address] OR  [email or web address] OR  https://www.cdc.gov/steadi/patient.html

## 2024-10-24 NOTE — PROGRESS NOTE ADULT - REASON FOR ADMISSION
scrotal pain
SBO vs. ileus
Scrotal Pain / Swelling
scrotal pain

## 2024-10-24 NOTE — PROGRESS NOTE ADULT - SUBJECTIVE AND OBJECTIVE BOX
HPI:    65 y/o M with PMHx of COPD, CLL, prior cardiac arrest presented to the ED with ongoing / worsening R scrotal pain over the past 6-7 days. He stated that a dog had jumped on him and caused a small puncture wound which initially was only a small red leslie. He admitted that over the next few days he developed worsening pain with swelling and redness / warmth.     10/13/24: Seen at bedside, no acute distress, has not follow up with Heme Onc in the past 2-3 years because of 'insurance issues', would like to return to see Dr Castaneda if possible    10/15/24: Seen at bedside in no acute distress.     10/22/24: Seen at bedside along with Dr. Ospina, patient in no acute distress    10/24/24: Seen at bedside, understanding and agreeable to IVIG      PAST MEDICAL & SURGICAL HISTORY:    CLL (chronic lymphocytic leukemia)    Chronic obstructive pulmonary disease (COPD)    Hypothyroidism    H/o Lyme disease    Hiatal hernia    History of herniated intervertebral disc    H/O sinus surgery    H/O shoulder surgery        MEDICATIONS  (STANDING):    acetaminophen     Tablet .. 650 milliGRAM(s) Oral once  acetaminophen   IVPB .. 1000 milliGRAM(s) IV Intermittent once  chlorhexidine 4% Liquid 1 Application(s) Topical <User Schedule>  diphenhydrAMINE 50 milliGRAM(s) Oral once  DULoxetine 60 milliGRAM(s) Oral daily  immune   globulin 10% (GAMMAGARD) IVPB 30 Gram(s) IV Intermittent once  influenza   Vaccine 0.5 milliLiter(s) IntraMuscular once  levothyroxine 125 MICROGram(s) Oral daily  midodrine. 5 milliGRAM(s) Oral three times a day  predniSONE   Tablet 10 milliGRAM(s) Oral daily  senna 2 Tablet(s) Oral at bedtime  tiotropium 2.5 MICROgram(s)/olodaterol 2.5 MICROgram(s) (STIOLTO) Inhaler 2 Puff(s) Inhalation daily  traZODone 100 milliGRAM(s) Oral at bedtime  trimethoprim  160 mG/sulfamethoxazole 800 mG 1 Tablet(s) Oral two times a day      MEDICATIONS  (PRN):    acetaminophen     Tablet .. 650 milliGRAM(s) Oral every 6 hours PRN Temp greater or equal to 38C (100.4F), Mild Pain (1 - 3)  albuterol    90 MICROgram(s) HFA Inhaler 2 Puff(s) Inhalation every 6 hours PRN Bronchospasm  bisacodyl 5 milliGRAM(s) Oral every 12 hours PRN Constipation  ondansetron Injectable 4 milliGRAM(s) IV Push every 6 hours PRN Nausea and/or Vomiting  oxyCODONE    IR 5 milliGRAM(s) Oral every 4 hours PRN Moderate Pain (4 - 6)        ALLERGIES:    No Known Allergies      FAMILY HISTORY:    lung cancer (Mother)  brain cancer (Mother)  Alzheimer's disease (Father)      REVIEW OF SYSTEMS:    Constitutional, Eyes, ENT, Cardiovascular, Respiratory, Gastrointestinal, Genitourinary, Musculoskeletal, Integumentary, Neurological, Psychiatric, Endocrine, Heme/Lymph and Allergic/Immunologic review of systems are otherwise negative except as noted in HPI.       VITALS:    T(C): 36.8 (24 Oct 2024 09:15), Max: 37 (23 Oct 2024 15:23)  T(F): 98.3 (24 Oct 2024 09:15), Max: 98.6 (23 Oct 2024 15:23)  HR: 60 (24 Oct 2024 09:15) (60 - 79)  BP: 89/59 (24 Oct 2024 09:15) (88/48 - 98/68)  BP(mean): --  ABP: --  ABP(mean): --  RR: 18 (24 Oct 2024 09:15) (18 - 18)  SpO2: 91% (24 Oct 2024 09:15) (91% - 96%)    O2 Parameters below as of 24 Oct 2024 09:15  Patient On (Oxygen Delivery Method): nasal cannula  O2 Flow (L/min): 3        PHYSICAL:    Constitutional: no acute distress, overall disheveled   Eyes: no conjunctival infection, anicteric.   ENT: pharynx is unremarkable  Neck: supple without JVD  Pulmonary: clear to auscultation bilaterally  Cardiac: RRR  Vascular: no calf tenderness, venous stasis changes  Abdomen: normoactive bowel sounds, soft and nontender, no hepatosplenomegaly or masses appreciated.   Lymphatic: no peripheral adenopathy appreciated.   Musculoskeletal: full range of motion and no deformities appreciated.   Skin: scrotum with erythema and swelling R > L side    Neurology: awake, alert, oriented; no obvious focal deficits        LABS:                                              8.8    33.96 )-----------( 387      ( 24 Oct 2024 08:12 )             27.3     10-24    141  |  107  |  15  ----------------------------<  88  4.2   |  30  |  0.81    Ca    9.0      24 Oct 2024 08:12            RADIOLOGY & ADDITIONAL STUDIES:      CT Pelvis w/ IV Cont (10.13.24 @ 02:18)    FINDINGS:  BLADDER: Distended.  REPRODUCTIVE ORGANS: Prostate gland is not enlarged.  LYMPH NODES: Asymmetric enlarged right inguinal lymph nodes;   representative lymph node measuring 1.8 x 1.0 cm.    VISUALIZED PORTIONS:  ABDOMINAL ORGANS: Within normal limits.  BOWEL: Several dilated fluid-filled small bowel loops in the right lower   abdomen.  PERITONEUM/RETROPERITONEUM: Within normal limits.  VESSELS: Mild atherosclerotic calcifications.  ABDOMINAL WALL: Large multilocular tubular rim-enhancing fluid collection   in the right hemiscrotum measuring up to 3 cm in diameter with tail like   extension extending along the right spermatic cord. Collection extends to   the skin surface at the level of the base of the penis. Scrotal wall   edema.  BONES: Degenerative changes of the spine.    IMPRESSION:    Large multilocular tubular rim-enhancing fluid collection in the right   hemiscrotum measuring up to 3 cm in diameter with pigtail-like extension   extending along the right spermatic cord. Collection extends to the skin   surface at the level of the base of the penis. Scrotal wall edema.   Constellation of findings and clinical history are most compatible with   an abscess.    Nonspecific mildly dilated fluid-filled small bowel loops in the right   lower abdomen.          US Testicles (10.13.24 @ 04:41)    FINDINGS:    RIGHT:  Right testis: 3.8 cm x 3.4 cm x 2.6 cm. Normal echogenicity and  echotexture with no masses or areas of architectural distortion. Normal   arterial and venous blood flow pattern.  Right epididymis: Within normal limits.  Right hydrocele: Moderate and complex.  Right varicocele: None.  Right scrotal wall: Thickened to 1.2 cm. Partially visualized complex   collection in the right scrotal wall measuring up to 6.5 cm x 2.5 cm x   4.2 cm with adjacent hyperemia, better depicted on the CT of the pelvis   from earlier the same day, compatible with an abscess.    LEFT:  Left testis: 4.5 cm x 2.8 cm x 2.8 cm. Normal echogenicity and   echotexture with no masses or areas of architectural distortion. Normal   arterial and venous blood flow pattern.  Left epididymis: 0.2 cm x 0.2 cm epididymal head cyst.  Left hydrocele: Small.  Left varicocele: None.  Left scrotal wall: Thickened to 0.6 cm.    IMPRESSION:    Partially visualized moderate sized complex collection in the right   scrotal wall, better depicted on the CT of the pelvis from earlier the   same day, compatible with an abscess.

## 2024-10-24 NOTE — PROGRESS NOTE ADULT - ASSESSMENT
65 y/o M w/ PMH of COPD, CLL, lyme disease, hypothyoridism, cardiac arrest, p/w scrotal pain. Patient states that five days ago a dog jumped on him and cause a small puncture wound to his scrotum. Initially, it was only a small red leslie, but then it started to progress over time and increased in swelling and pain.  Patient also, complains about intermittent abdominal discomfort, but is vague about the symptoms. Denies, nausea, vomiting, chest pain, shortness of breath, cough, runny nose, sore throat. As per ED physician, patient was seen by the surgery p a covering for urology team in the emergency room. Wbc ct 29 UA positive, imaging shows Large multilocular tubular rim-enhancing fluid collection in the right hemiscrotum measuring up to 3 cm in diameter with pigtail-like extension extending along the right spermatic cord. Collection extends to the skin surface at the level of the base of the penis. Scrotal wall edema. Started on IV abx, urology consulted.     1. Severe scrotal abscess/cellulitis s/p debridement. Pyuria. Complicated UTI. CLL. Leukocytosis. s/p Dog bite  - imaging reviewed repeat CT noted  - s/p I & D and repeat debridement 10/18  - s/p debridement 10/22 noted with hematoma, evacuated   - urology f/u noted  - f/u urine cx blood cx no growth, abscess cx growing MRSA, body fluid cx 10/18 no growth so far   - s/p IV zosyn [id number]  #5, s/p unasyn [id number] #5-6  - s/p vancomycin  [id number] #11 trough therapeutic  - change to po bactrim 1 DS BID   - continue with antibiotic coverage   - tolerating abx well so far; no side effects noted  - reason for abx use and side effects reviewed with patient  - supportive care  - fu cbc    Clinical team may change from intravenous to oral antibiotics when the following criteria are met:   1. Patient is clinically improving/stable       a)	Improved signs and symptoms of infection from initial presentation       b)	Afebrile for 24 hours       c)	Leukocytosis trending towards normal range   2. Patient is tolerating oral intake   3. Initial/repeat blood cultures are negative     when above met change to po bactrim 1 DS BID x 21 day course

## 2024-10-24 NOTE — DISCHARGE NOTE NURSING/CASE MANAGEMENT/SOCIAL WORK - FINANCIAL ASSISTANCE
[facility] provides services at a reduced cost to those who are determined to be eligible through [facility]’s financial assistance program. Information regarding [facility]’s financial assistance program can be found by going to [email or web address] or by calling [phone].

## 2024-10-25 ENCOUNTER — EMERGENCY (EMERGENCY)
Facility: HOSPITAL | Age: 64
LOS: 0 days | Discharge: HOME | End: 2024-10-25
Attending: EMERGENCY MEDICINE
Payer: MEDICAID

## 2024-10-25 VITALS
SYSTOLIC BLOOD PRESSURE: 105 MMHG | TEMPERATURE: 98 F | HEART RATE: 80 BPM | WEIGHT: 108.91 LBS | HEIGHT: 63 IN | OXYGEN SATURATION: 91 % | DIASTOLIC BLOOD PRESSURE: 80 MMHG | RESPIRATION RATE: 18 BRPM

## 2024-10-25 VITALS
HEART RATE: 85 BPM | OXYGEN SATURATION: 98 % | DIASTOLIC BLOOD PRESSURE: 83 MMHG | SYSTOLIC BLOOD PRESSURE: 121 MMHG | TEMPERATURE: 98 F | RESPIRATION RATE: 17 BRPM

## 2024-10-25 DIAGNOSIS — Z98.890 OTHER SPECIFIED POSTPROCEDURAL STATES: Chronic | ICD-10-CM

## 2024-10-25 DIAGNOSIS — E03.9 HYPOTHYROIDISM, UNSPECIFIED: ICD-10-CM

## 2024-10-25 DIAGNOSIS — J44.9 CHRONIC OBSTRUCTIVE PULMONARY DISEASE, UNSPECIFIED: ICD-10-CM

## 2024-10-25 DIAGNOSIS — N49.2 INFLAMMATORY DISORDERS OF SCROTUM: ICD-10-CM

## 2024-10-25 DIAGNOSIS — K44.9 DIAPHRAGMATIC HERNIA WITHOUT OBSTRUCTION OR GANGRENE: ICD-10-CM

## 2024-10-25 DIAGNOSIS — C91.10 CHRONIC LYMPHOCYTIC LEUKEMIA OF B-CELL TYPE NOT HAVING ACHIEVED REMISSION: ICD-10-CM

## 2024-10-25 PROCEDURE — 99284 EMERGENCY DEPT VISIT MOD MDM: CPT

## 2024-10-25 RX ORDER — OXYCODONE HYDROCHLORIDE 30 MG/1
5 TABLET, FILM COATED, EXTENDED RELEASE ORAL ONCE
Refills: 0 | Status: DISCONTINUED | OUTPATIENT
Start: 2024-10-25 | End: 2024-10-25

## 2024-10-25 RX ORDER — OXYCODONE AND ACETAMINOPHEN 5; 325 MG/1; MG/1
2 TABLET ORAL ONCE
Refills: 0 | Status: DISCONTINUED | OUTPATIENT
Start: 2024-10-25 | End: 2024-10-25

## 2024-10-25 RX ORDER — SULFAMETHOXAZOLE/TRIMETHOPRIM 800-160 MG
1 TABLET ORAL
Qty: 40 | Refills: 0
Start: 2024-10-25 | End: 2024-11-13

## 2024-10-25 RX ORDER — SULFAMETHOXAZOLE/TRIMETHOPRIM 800-160 MG
1 TABLET ORAL
Qty: 0 | Refills: 0
Start: 2024-10-25

## 2024-10-25 RX ADMIN — OXYCODONE HYDROCHLORIDE 5 MILLIGRAM(S): 30 TABLET, FILM COATED, EXTENDED RELEASE ORAL at 11:16

## 2024-10-25 RX ADMIN — OXYCODONE AND ACETAMINOPHEN 2 TABLET(S): 5; 325 TABLET ORAL at 15:41

## 2024-10-25 NOTE — CHART NOTE - NSCHARTNOTEFT_GEN_A_CORE
I called the [facility] to find out if the bactrim script could be transferred for delivery from the facility to the pt's home but they informed me that they would be returning the medications. I then called [facility] pharmacy and was informed that they would be unable to fill the prescriptions at this time because it is a duplicate prescription for his monthly refills.  The [facility] will need to return the medication and stop or reverse the claim before they could fill the prescriptions.  I called the patient and he informed me that he does have a limited supply of his regular monthly prescriptions but does not have the bactrim.  I called the medical director and he will assist in getting this medication for the pt.  During my conversation with the patient, I reviewed his discharge paperwork from yesterday and gave him the follow up information for the his PCP, Urology and Onc with contact information.  He was also given the contact information for [facility].

## 2024-10-25 NOTE — ED PROVIDER NOTE - PROGRESS NOTE DETAILS
pt refusing to go back to rehab, wants home care services, is able to get homecare services, which he will benefit from, spoke with case management who will set up MAGED Banerjee DO

## 2024-10-25 NOTE — ED ADULT NURSE REASSESSMENT NOTE - NS ED NURSE REASSESS COMMENT FT1
Pt received on stretcher. Pt a&o x4. Pt vitals stable. Pt requesting to speak with  regarding going home with home care. Pt reports he does not like the [place] and would be more comfortable at home. Pt reports pain to R groin/scrotal area. Wound is open to air. No drainage or discharge noted. MD Banerjee made aware. Support ongoing. Haley

## 2024-10-25 NOTE — DISCHARGE NOTE NURSING/CASE MANAGEMENT/SOCIAL WORK - NSDCPEFALRISK_GEN_ALL_CORE
For information on Fall & Injury Prevention, visit: [email or web address] OR  [email or web address] OR  https://www.cdc.gov/steadi/patient.html

## 2024-10-25 NOTE — ED ADULT NURSE NOTE - CHIEF COMPLAINT QUOTE
pt presents to ED from [facility]. pt d/c from  to [facility] yesterday, being treated for scrotal abscess. pt does not want to stay at [facility] and wants to go home. [facility] sent pt  to ED to be set up with home care.

## 2024-10-25 NOTE — DISCHARGE NOTE NURSING/CASE MANAGEMENT/SOCIAL WORK - PATIENT PORTAL LINK FT
You can access the FollowMyHealth Patient Portal offered by [facility] by registering at the following website: [email or web address]. By joining [employer]’s FollowMyHealth portal, you will also be able to view your health information using other applications (apps) compatible with our system.

## 2024-10-25 NOTE — ED ADULT NURSE NOTE - OBJECTIVE STATEMENT
Pt A&Ox4, presents to the ED from [facility] requesting home care. Pt recently admitted to  for a scrotal abscess. Abscess was drained on 10/23, wound noted to scrotum and pubic area. Denies fevers. No drainage noted.

## 2024-10-25 NOTE — DISCHARGE NOTE NURSING/CASE MANAGEMENT/SOCIAL WORK - FINANCIAL ASSISTANCE
[facility] provides services at a reduced cost to those who are determined to be eligible through [facility]’s financial assistance program. Information regarding [facility]’s financial assistance program can be found by going to [email or web address] or by calling [phone].

## 2024-10-25 NOTE — ED PROVIDER NOTE - PATIENT PORTAL LINK FT
You can access the FollowMyHealth Patient Portal offered by [facility] by registering at the following website: [email or web address]. By joining [employer]’s FollowMyHealth portal, you will also be able to view your health information using other applications (apps) compatible with our system.

## 2024-10-25 NOTE — ED PROVIDER NOTE - OBJECTIVE STATEMENT
63 y/o male with a PMHx of COPD, CLL, hiatal hernia, herniated intervertebral disc, hypothyroid, lyme disease presents to the ED BIBA from [facility] for evaluation. Pt was admitted to  for scrotal abscess and was d/c to [facility] yesterday. Pt does not want to stay at [facility] and insisted on returning to the hospital. Pt sent from [facility] to be set up with home care. Pt received Prednisone and his abx prior to being brought to ED. No other complaints at this time.

## 2024-10-25 NOTE — CHART NOTE - NSCHARTNOTEFT_GEN_A_CORE
I spoke with pt about discharge plan after introducing myself and explaining my role as RN CM in the ER.  He informed me that [facility] and would prefer to return home.  He was sent to the hospital by [facility] Discussed the discharge plans for this pt after introducing myself and explaining the role of RN KELSIE in ER to the pt.  He was discharged last night and sent to [facility], the pt was sent back to the hospital because he refused to stay.  He is requesting to be discharged home with home care services.  [facility] and the [facility] was sent to [facility] as requested by pt.  He was informed that the [facility] to schedule the SOC for tomorrow.  He ambulated to the bathroom with the nurse, declined a walker because he has one at home.  He is agreeable to learning how to do the wet to dry dressing and will be discharged on oral anti-botics(bactrim x 21 days total).  We discussed that he will need to follow up with urology and he would like to be followed by Onc for CLL that he had in the past-last treatment 3 years ago.   is doing face to face for [facility], [facility]. Pt' brother will transport him home.  [facility] notified that the pt will not be returning.

## 2024-10-25 NOTE — ED PROVIDER NOTE - NSFOLLOWUPINSTRUCTIONS_ED_ALL_ED_FT
change your dressing once a day    continue your medications as prescribed    you will be followed by home care nursing

## 2024-10-30 DIAGNOSIS — D53.9 NUTRITIONAL ANEMIA, UNSPECIFIED: ICD-10-CM

## 2024-10-30 DIAGNOSIS — B95.62 METHICILLIN RESISTANT STAPHYLOCOCCUS AUREUS INFECTION AS THE CAUSE OF DISEASES CLASSIFIED ELSEWHERE: ICD-10-CM

## 2024-10-30 DIAGNOSIS — Z86.74 PERSONAL HISTORY OF SUDDEN CARDIAC ARREST: ICD-10-CM

## 2024-10-30 DIAGNOSIS — J98.11 ATELECTASIS: ICD-10-CM

## 2024-10-30 DIAGNOSIS — F32.A DEPRESSION, UNSPECIFIED: ICD-10-CM

## 2024-10-30 DIAGNOSIS — E03.9 HYPOTHYROIDISM, UNSPECIFIED: ICD-10-CM

## 2024-10-30 DIAGNOSIS — J90 PLEURAL EFFUSION, NOT ELSEWHERE CLASSIFIED: ICD-10-CM
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
## 2024-10-30 DIAGNOSIS — Z79.890 HORMONE REPLACEMENT THERAPY: ICD-10-CM

## 2024-10-30 DIAGNOSIS — A69.20 LYME DISEASE, UNSPECIFIED: ICD-10-CM

## 2024-10-30 DIAGNOSIS — N49.2 INFLAMMATORY DISORDERS OF SCROTUM: ICD-10-CM

## 2024-10-30 DIAGNOSIS — Z80.1 FAMILY HISTORY OF MALIGNANT NEOPLASM OF TRACHEA, BRONCHUS AND LUNG: ICD-10-CM

## 2024-10-30 DIAGNOSIS — R09.02 HYPOXEMIA: ICD-10-CM

## 2024-10-30 DIAGNOSIS — I95.9 HYPOTENSION, UNSPECIFIED: ICD-10-CM
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
## 2024-10-30 DIAGNOSIS — N50.89 OTHER SPECIFIED DISORDERS OF THE MALE GENITAL ORGANS: ICD-10-CM

## 2024-10-30 DIAGNOSIS — J44.9 CHRONIC OBSTRUCTIVE PULMONARY DISEASE, UNSPECIFIED: ICD-10-CM

## 2024-10-30 DIAGNOSIS — S31.33XA: ICD-10-CM

## 2024-10-30 DIAGNOSIS — N49.1 INFLAMMATORY DISORDERS OF SPERMATIC CORD, TUNICA VAGINALIS AND VAS DEFERENS: ICD-10-CM

## 2024-10-30 DIAGNOSIS — C91.10 CHRONIC LYMPHOCYTIC LEUKEMIA OF B-CELL TYPE NOT HAVING ACHIEVED REMISSION: ICD-10-CM

## 2024-10-30 DIAGNOSIS — N43.3 HYDROCELE, UNSPECIFIED: ICD-10-CM

## 2024-10-30 DIAGNOSIS — G47.00 INSOMNIA, UNSPECIFIED: ICD-10-CM

## 2024-10-30 DIAGNOSIS — R82.81 PYURIA: ICD-10-CM

## 2024-10-30 DIAGNOSIS — K56.7 ILEUS, UNSPECIFIED: ICD-10-CM

## 2024-11-16 LAB
CULTURE RESULTS: SIGNIFICANT CHANGE UP
SPECIMEN SOURCE: SIGNIFICANT CHANGE UP

## 2025-07-23 NOTE — ED PROVIDER NOTE - IV ALTEPLASE ADMIN OUTSIDE HIDDEN
Discharge Diagnosis  SSS (sick sinus syndrome) (Multi)    Issues Requiring Follow-Up  Patient to follow up in four weeks for device check - scheduled.    Test Results Pending At Discharge  Pending Labs       No current pending labs.          Hospital Course   This is a 67 y.o. male with a PMH significant for atrial fibrillation, HLD, DM and bradycardia.  The patient has been experiencing symptomatic bradycardia with fatigue and shortness of breath.  His EKG showed sinus bradycardia with periods of junctional bradycardia with heart rates in the 40s. The patient presented to [facility] yesterday, 7/22/2025 for leadless permanent pacemaker placement with Dr. Ramicone.      PMH:  atrial fibrillation, HLD, DM, SSS, vitamin D deficiency, vitamin B12 deficiency, BPH, fibromyalgia, DICK  PSH:  hemorrhoid surgery, ablation  Family history:  Mother - TIA.  Father - MI.  Sister - breast CA.  Social history:  Never a smoker, denies alcohol and illicit drug use    7/23/25:  s/p leadless pacemaker insertion with Dr. Ramicone 7/22/25.  Upon exam this AM patient is lying in bed in no apparent distress without complaints.  VSS.  Suture removed from right groin site covered with DRSG, no hematoma, no erythema, ecchymosis noted.  Device interrogation completed this AM okay per Dr. Ramicone.  Patient will be discharged home today.    Symptomatic bradycardia s/p leadless pacemaker insertion  - discharge home today  - follow up for device check in 4 weeks - scheduled  - continue home medications    Above patient and plan discussed with Dr. Ramicone.       Visit Vitals  /71   Pulse 69   Temp 36 °C (96.8 °F) (Temporal)   Resp 14     Vitals:    07/22/25 1123   Weight: 78.6 kg (173 lb 4.5 oz)       Immunization History   Administered Date(s) Administered    Flu vaccine (IIV4), preservative free *Check age/dose* 09/20/2021, 09/26/2022    Hepatitis B vaccine, adult *Check Product/Dose* 06/07/2021, 07/07/2021    Influenza, seasonal, injectable  11/10/2015, 10/25/2016, 11/09/2017    Moderna COVID-19 vaccine, 12 years and older (50mcg/0.5mL)(Spikevax) 10/25/2023, 10/18/2024    Moderna SARS-CoV-2 Vaccination 03/09/2021, 04/07/2021, 11/05/2021, 04/05/2022, 11/08/2022    Pneumococcal conjugate vaccine, 20-valent (PREVNAR 20) 03/08/2024    Tdap vaccine, age 7 year and older (BOOSTRIX, ADACEL) 06/07/2021    Zoster, Unspecified 05/07/2019, 07/23/2019       Results        Pertinent Physical Exam At Time of Discharge  Physical Exam  Constitutional:       General: He is not in acute distress.    Cardiovascular:      Rate and Rhythm: Normal rate and regular rhythm.      Comments: + pulses all extremities.  Pulmonary:      Effort: Pulmonary effort is normal. No respiratory distress.      Comments: Clear bilaterally.  Abdominal:      Comments: + BS.     Skin:     General: Skin is warm and dry.      Comments: Right groin site covered with DRSG D+I, no hematoma, no erythema, ecchymosis noted.     Neurological:      General: No focal deficit present.      Mental Status: He is alert and oriented to person, place, and time.     Psychiatric:         Mood and Affect: Mood normal.         Behavior: Behavior normal.       Home Medications     Medication List      ASK your doctor about these medications     EPINEPHrine 0.3 mg/0.3 mL injection syringe; Commonly known as: Epipen   FISH OIL ORAL   mesalamine 1.2 gram EC tablet; Commonly known as: Lialda   methylPREDNISolone 4 mg tablets; Commonly known as: Medrol ([place]); Follow   schedule on package instructions   milk thistle 175 mg tablet   multivitamin tablet   predniSONE 5 mg tablet; Commonly known as: Deltasone   Vitamin C 500 mg tablet; Generic drug: ascorbic acid   Vitamin D3 25 mcg (1,000 units) capsule; Generic drug: cholecalciferol   zinc gluconate 50 mg elemental tablet       Outpatient Follow-Up  Future Appointments   Date Time Provider Department Center   8[facility]21[facility]2025 11:[facility]  [id number] [facility] [address]   9/12/2025  8:00 AM PAR ULTRASOUND 1 [facility] PAR RAD   10/9/2025  8:00 AM BRDVW ECHO/STRESS [id number] [facility] Ra   10/15/2025  9:45 AM Karthik Santa MD [id number] [place]   3/13/2026  8:30 AM Flo CARRINGTON DO [id number] Juan Sneed, APRN-CNP   show